# Patient Record
Sex: FEMALE | Race: WHITE | Employment: UNEMPLOYED | URBAN - METROPOLITAN AREA
[De-identification: names, ages, dates, MRNs, and addresses within clinical notes are randomized per-mention and may not be internally consistent; named-entity substitution may affect disease eponyms.]

---

## 2017-01-26 ENCOUNTER — TELEPHONE (OUTPATIENT)
Dept: FAMILY MEDICINE CLINIC | Facility: CLINIC | Age: 57
End: 2017-01-26

## 2017-01-26 RX ORDER — MELOXICAM 15 MG/1
15 TABLET ORAL DAILY
Qty: 90 TABLET | Refills: 0 | Status: SHIPPED | OUTPATIENT
Start: 2017-01-26 | End: 2017-05-11

## 2017-01-26 NOTE — TELEPHONE ENCOUNTER
Pt states the injury occurred one week ago. She rested her knee over the weekend and it felt better Monday. She resumed her regular activity and had recurring symptoms Tuesday. She rested her knee again, but yesterday she had pain in her knee.  Pt describes

## 2017-01-26 NOTE — TELEPHONE ENCOUNTER
Pt notified by phone and verbalized understanding. Pt requested to make an appointment for Monday in case her knee does not improve over the weekend. Appointment scheduled for 1/30/17.     Future Appointments  Date Time Provider Jennifer Stanley   1/30/201

## 2017-01-26 NOTE — TELEPHONE ENCOUNTER
Well lets try and ice and elevate, and I can call in a refill for the meloxicam, and rest it for a while and see if that doen't help and if not then she needs to be seen

## 2017-01-28 ENCOUNTER — TELEPHONE (OUTPATIENT)
Dept: FAMILY MEDICINE CLINIC | Facility: CLINIC | Age: 57
End: 2017-01-28

## 2017-01-28 RX ORDER — CYCLOBENZAPRINE HCL 10 MG
TABLET ORAL
Qty: 90 TABLET | Refills: 0 | COMMUNITY
Start: 2017-01-28 | End: 2017-04-11

## 2017-01-28 NOTE — TELEPHONE ENCOUNTER
Fax received from [facility], [place], [facility] requesting refills of cyclobenzaprine 10 mg tablets. Fax form completed by Dr. Jose Aguilar and faxed to [phone].

## 2017-01-30 ENCOUNTER — HOSPITAL ENCOUNTER (OUTPATIENT)
Dept: GENERAL RADIOLOGY | Age: 57
Discharge: HOME | End: 2017-01-30
Attending: FAMILY MEDICINE
Payer: COMMERCIAL

## 2017-01-30 ENCOUNTER — OFFICE VISIT (OUTPATIENT)
Dept: FAMILY MEDICINE CLINIC | Facility: CLINIC | Age: 57
End: 2017-01-30

## 2017-01-30 VITALS
OXYGEN SATURATION: 96 % | TEMPERATURE: 99 F | SYSTOLIC BLOOD PRESSURE: 118 MMHG | HEIGHT: 65.5 IN | HEART RATE: 103 BPM | RESPIRATION RATE: 16 BRPM | DIASTOLIC BLOOD PRESSURE: 78 MMHG

## 2017-01-30 DIAGNOSIS — E66.01 MORBID OBESITY WITH BMI OF 45.0-49.9, ADULT (HCC): ICD-10-CM

## 2017-01-30 DIAGNOSIS — M25.562 ACUTE PAIN OF LEFT KNEE: ICD-10-CM

## 2017-01-30 DIAGNOSIS — M25.562 ACUTE PAIN OF LEFT KNEE: Primary | ICD-10-CM

## 2017-01-30 PROCEDURE — 99214 OFFICE O/P EST MOD 30 MIN: CPT | Performed by: FAMILY MEDICINE

## 2017-01-30 PROCEDURE — 73560 X-RAY EXAM OF KNEE 1 OR 2: CPT

## 2017-01-30 RX ORDER — NAPROXEN 500 MG/1
500 TABLET ORAL 2 TIMES DAILY WITH MEALS
Qty: 40 TABLET | Refills: 0 | Status: SHIPPED | OUTPATIENT
Start: 2017-01-30 | End: 2017-04-19

## 2017-01-30 NOTE — PROGRESS NOTES
Errol Brand is a 61year old female.   HPI:   Fayne Sever was trying to et into her car and she thinks she pushed off on her left knee and then felt something pop anteriorly and then it became very painful, and has been having issues with walking on it, has (Temporal)  Resp 16  Ht 65.5\"  SpO2 96%  GENERAL: well developed, well nourished,in no apparent distress  SKIN: no rashes,no suspicious lesions  HEENT: atraumatic, normocephalic,ears and throat are clear  NECK: supple,no adenopathy,no bruits  LUNGS: clear

## 2017-02-09 ENCOUNTER — TELEPHONE (OUTPATIENT)
Dept: FAMILY MEDICINE CLINIC | Facility: CLINIC | Age: 57
End: 2017-02-09

## 2017-02-09 RX ORDER — AMOXICILLIN 500 MG/1
500 CAPSULE ORAL 2 TIMES DAILY
Qty: 20 CAPSULE | Refills: 0 | Status: SHIPPED | OUTPATIENT
Start: 2017-02-09 | End: 2017-04-19

## 2017-02-09 RX ORDER — ACETAMINOPHEN AND CODEINE PHOSPHATE 300; 30 MG/1; MG/1
1 TABLET ORAL EVERY 4 HOURS PRN
Qty: 30 TABLET | Refills: 0 | Status: SHIPPED | OUTPATIENT
Start: 2017-02-09 | End: 2017-04-19

## 2017-03-07 ENCOUNTER — TELEPHONE (OUTPATIENT)
Dept: FAMILY MEDICINE CLINIC | Facility: CLINIC | Age: 57
End: 2017-03-07

## 2017-03-07 NOTE — TELEPHONE ENCOUNTER
Last refilled on 1/26/17 for # 90 with 0 rf. Last labs 2013. Last seen on 1/30/17. No future appointments. Thank you.

## 2017-03-09 NOTE — TELEPHONE ENCOUNTER
Dot Quintero wanted to know why prescription was not refilled. Advised that this was refilled on 1/26/17 for #90 and sent to [facility] in [place]. Should not need refill until late April. Dot Quintero will call to confirm.  Not sure if they provided with a 90-d

## 2017-03-10 ENCOUNTER — TELEPHONE (OUTPATIENT)
Dept: FAMILY MEDICINE CLINIC | Facility: CLINIC | Age: 57
End: 2017-03-10

## 2017-03-10 ENCOUNTER — NURSE ONLY (OUTPATIENT)
Dept: FAMILY MEDICINE CLINIC | Facility: CLINIC | Age: 57
End: 2017-03-10

## 2017-03-10 VITALS
DIASTOLIC BLOOD PRESSURE: 90 MMHG | OXYGEN SATURATION: 98 % | RESPIRATION RATE: 12 BRPM | SYSTOLIC BLOOD PRESSURE: 130 MMHG | TEMPERATURE: 98 F | HEART RATE: 101 BPM

## 2017-03-10 DIAGNOSIS — K04.7 DENTAL INFECTION: Primary | ICD-10-CM

## 2017-03-10 PROCEDURE — 99213 OFFICE O/P EST LOW 20 MIN: CPT | Performed by: PHYSICIAN ASSISTANT

## 2017-03-10 RX ORDER — ACETAMINOPHEN AND CODEINE PHOSPHATE 300; 30 MG/1; MG/1
1 TABLET ORAL EVERY 4 HOURS PRN
Qty: 5 TABLET | Refills: 0 | Status: SHIPPED
Start: 2017-03-10 | End: 2017-03-12 | Stop reason: WASHOUT

## 2017-03-10 RX ORDER — AMOXICILLIN AND CLAVULANATE POTASSIUM 875; 125 MG/1; MG/1
1 TABLET, FILM COATED ORAL 2 TIMES DAILY
Qty: 20 TABLET | Refills: 0 | Status: SHIPPED | OUTPATIENT
Start: 2017-03-10 | End: 2017-03-20

## 2017-03-10 NOTE — TELEPHONE ENCOUNTER
Left message on answering machine for patient to return phone call. Spoke to KE she states she needs get these medications from her dentist, this will not be something she will order.

## 2017-03-10 NOTE — PROGRESS NOTES
CHIEF COMPLAINT:   Patient presents with:  Dental Problem: tooth pain, multiple chipped/broken teeth        HPI:   Belle Lam is a 61year old female who presents with dental pain for a couple days.   Has a cracked tooth to right lower jaw with rednes • DJD (degenerative joint disease), lumbar    • DJD of shoulder      R    • Left knee DJD    • Migraine    • Dry eyes, bilateral    • TMJ arthritis       Social History:    Smoking Status: Former Smoker                   Packs/Day: 0.00  Years:           S Sig: Take 1 tablet by mouth 2 (two) times daily. Risks, benefits, and side effects of medication explained and discussed. The patient indicates understanding of these issues and agrees to the plan.   The patient is asked to follow-up with dent · If you have signs of an infection, you will be given an antibiotic. Take it as directed. Follow-up care  Follow up with your dentist, or as advised. Your pain may go away with the treatment given today.  But only a dentist can fully look at and treat the

## 2017-03-10 NOTE — PATIENT INSTRUCTIONS
Please follow up with your PCP if no improvement within 5-7 days. Go directly to the ER for any acute worsening of symptoms. Follow up with dentist. Ramandeep Reynolds to ER for any worsening of symptoms.    Dental Pain    A crack or cavity in a tooth can cause tooth bobby Call 911  Call 911 if any of these occur:  · Unusual drowsiness  · Headache or stiff neck  · Weakness or fainting  · Difficulty swallowing or breathing  When to seek medical advice  Call your health care provider right away if any of these occur:  · Your f

## 2017-03-10 NOTE — TELEPHONE ENCOUNTER
Pt called, has tooth pain. Would like to talk with a nurse. States she needs an antibiotic prescribed? Please call pt at [phone]  Won't be able to come in until 4 this afternoon if she needs to be seen by Dr. Gloria Aponte.       Patient states she called

## 2017-03-10 NOTE — TELEPHONE ENCOUNTER
Called and spoke to patient she states she will spread out the dose enough to be able to get her to Monday till DS is here. She also states that she will try to make it last until Tuesday.    Per KE she states she will send in t3 for her to get her through

## 2017-03-10 NOTE — TELEPHONE ENCOUNTER
Called and spoke to patient, she states she has only 10 tablets left of the tylenol 3 and has completely used all of the amoxicillin.    She states she will try and call her dentist office back as she has an appt on Tuesday and see if they can rx something

## 2017-03-15 ENCOUNTER — TELEPHONE (OUTPATIENT)
Dept: FAMILY MEDICINE CLINIC | Facility: CLINIC | Age: 57
End: 2017-03-15

## 2017-03-15 NOTE — TELEPHONE ENCOUNTER
Pt just wanted to say thank you for all your help. She is seeing a dentist for the issue and has f/u appt in two weeks.

## 2017-04-11 RX ORDER — CYCLOBENZAPRINE HCL 10 MG
TABLET ORAL
Qty: 90 TABLET | Refills: 0 | Status: SHIPPED | OUTPATIENT
Start: 2017-04-11 | End: 2017-05-11

## 2017-04-19 ENCOUNTER — TELEPHONE (OUTPATIENT)
Dept: FAMILY MEDICINE CLINIC | Facility: CLINIC | Age: 57
End: 2017-04-19

## 2017-04-19 RX ORDER — ACETAMINOPHEN AND CODEINE PHOSPHATE 300; 30 MG/1; MG/1
1 TABLET ORAL EVERY 4 HOURS PRN
Qty: 30 TABLET | Refills: 0 | Status: SHIPPED | OUTPATIENT
Start: 2017-04-19 | End: 2018-08-15

## 2017-04-20 NOTE — TELEPHONE ENCOUNTER
Pt was confused and did not know what pharmacy to  her script for T3. She was tearful and retold the story of her fall. Please call in AM for follow up.

## 2017-04-20 NOTE — TELEPHONE ENCOUNTER
Spoke with her, advised where the prescription was sent. States she is still in a lot of pain. Offered OV, she scheduled for Tuesday.

## 2017-05-11 ENCOUNTER — TELEPHONE (OUTPATIENT)
Dept: FAMILY MEDICINE CLINIC | Facility: CLINIC | Age: 57
End: 2017-05-11

## 2017-05-11 DIAGNOSIS — Z00.00 ROUTINE HEALTH MAINTENANCE: Primary | ICD-10-CM

## 2017-05-11 DIAGNOSIS — R79.89 ELEVATED LIVER FUNCTION TESTS: ICD-10-CM

## 2017-05-11 RX ORDER — CYCLOBENZAPRINE HCL 10 MG
TABLET ORAL
Qty: 90 TABLET | Refills: 0 | Status: SHIPPED | OUTPATIENT
Start: 2017-05-11 | End: 2017-06-13

## 2017-05-11 RX ORDER — MELOXICAM 15 MG/1
TABLET ORAL
Qty: 90 TABLET | Refills: 3 | Status: SHIPPED | OUTPATIENT
Start: 2017-05-11 | End: 2018-02-23

## 2017-05-11 NOTE — TELEPHONE ENCOUNTER
Pt is willing to do fecal occult testing. She also stated she is overdue for labs to check her liver enzymes.

## 2017-05-11 NOTE — TELEPHONE ENCOUNTER
Last refill on Meloxicam #90 with 0 refills on 1 26 2017  Last refill on Cyclobenzaprine #90 with 0 refills on 4 11 2017

## 2017-06-08 ENCOUNTER — TELEPHONE (OUTPATIENT)
Dept: FAMILY MEDICINE CLINIC | Facility: CLINIC | Age: 57
End: 2017-06-08

## 2017-06-08 NOTE — TELEPHONE ENCOUNTER
Pt may have taken four tablets of venlafaxine 150 mg ER capsules by mistake. Pt states she is unsure if she took two capsules this morning. She was refilling her pill box and couldn't remember if she took her morning dose.  She took an additional two capsul

## 2017-06-12 ENCOUNTER — TELEPHONE (OUTPATIENT)
Dept: FAMILY MEDICINE CLINIC | Facility: CLINIC | Age: 57
End: 2017-06-12

## 2017-06-12 NOTE — TELEPHONE ENCOUNTER
Pt is overdue to have CMP drawn and stool tested for occult blood. Letter mailed to pt to call office to schedule.

## 2017-06-13 RX ORDER — CYCLOBENZAPRINE HCL 10 MG
TABLET ORAL
Qty: 90 TABLET | Refills: 0 | Status: SHIPPED | OUTPATIENT
Start: 2017-06-13 | End: 2017-08-29

## 2017-08-11 RX ORDER — METAXALONE 800 MG/1
400 TABLET ORAL 3 TIMES DAILY PRN
Qty: 5 TABLET | Refills: 0 | Status: SHIPPED | OUTPATIENT
Start: 2017-08-11 | End: 2017-09-10

## 2017-08-11 NOTE — TELEPHONE ENCOUNTER
Patient notified and verbalized understanding. States she is having a lot of pain in her knee and would like to know if she can be seen Monday or Tuesday. Routed to Dr Nilson Green to advise.    Patient aware Dr Nilson Green out of the office until Monday

## 2017-08-11 NOTE — TELEPHONE ENCOUNTER
Last OV 1/30/17  Last refilled 7/18/16  #90  2 refills    Spoke with patient who states she takes cyclobenzaprine daily but only takes metaxalone rarely if needed.   Advised patient Dr Mirian Ramachandran is out of the office until Monday and will not address refill unti

## 2017-08-11 NOTE — TELEPHONE ENCOUNTER
Metaxalone 800 MG Oral Tab (Discontinued) 90 tablet 2 7/18/2016 4/19/2017   Sig :  Take 0.5 tablets (400 mg total) by mouth 3 (three) times daily. Ye[address] Ates [address].

## 2017-08-12 NOTE — TELEPHONE ENCOUNTER
@;30 on Thursday would be better but what about trying to see Ortho, Dr. Frankey Heir is on on Tuesdays\"

## 2017-08-17 ENCOUNTER — PATIENT OUTREACH (OUTPATIENT)
Dept: FAMILY MEDICINE CLINIC | Facility: CLINIC | Age: 57
End: 2017-08-17

## 2017-08-17 NOTE — PROGRESS NOTES
Ny Press is due for mammogram.   Last mammogram date:  None in Epic. Mychart/Letter sent to patient.

## 2017-08-29 ENCOUNTER — TELEPHONE (OUTPATIENT)
Dept: FAMILY MEDICINE CLINIC | Facility: CLINIC | Age: 57
End: 2017-08-29

## 2017-08-29 RX ORDER — CYCLOBENZAPRINE HCL 10 MG
TABLET ORAL
Qty: 90 TABLET | Refills: 0 | Status: SHIPPED | OUTPATIENT
Start: 2017-08-29 | End: 2017-12-27

## 2017-08-29 RX ORDER — CLOTRIMAZOLE AND BETAMETHASONE DIPROPIONATE 10; .64 MG/G; MG/G
CREAM TOPICAL
Qty: 60 G | Refills: 3 | Status: SHIPPED | OUTPATIENT
Start: 2017-08-29 | End: 2019-06-03

## 2017-08-29 NOTE — TELEPHONE ENCOUNTER
PT CALLED AND ADV THAT PT HAS BAD RASH (FUNGAL INFECTION) UNDER BREAST AND IN CREASE ON UNDERWARE BETWEEN LEGS.    PT WOULD LIKE TO TRY fluconazole (DIFLUCAN) 100 MG Oral Tab     PLEASE SEND TO [facility] [address]    THANK YOU---PLEASE CALL IF YOU W

## 2017-11-14 ENCOUNTER — TELEPHONE (OUTPATIENT)
Dept: FAMILY MEDICINE CLINIC | Facility: CLINIC | Age: 57
End: 2017-11-14

## 2017-11-14 RX ORDER — METHYLPREDNISOLONE 4 MG/1
TABLET ORAL
Qty: 1 KIT | Refills: 0 | Status: SHIPPED | OUTPATIENT
Start: 2017-11-14 | End: 2017-12-28 | Stop reason: ALTCHOICE

## 2017-11-14 NOTE — TELEPHONE ENCOUNTER
Who was she seeing? And where?  She could probably go to Dr. Randa Myers but he would need her films and records  I can call in a medrol dose isadora

## 2017-11-14 NOTE — TELEPHONE ENCOUNTER
Pt will be seeing surgeon next week. Detailed message left for pt on cell informing her medrol dose pack was prescribed.

## 2017-11-14 NOTE — TELEPHONE ENCOUNTER
Pt having second shoulder surgery. To see surgeon next week Pain is bad in thumb and shoulder. Pain specialist moved too far away. Can DS prescribe something?  [facility] [address]

## 2017-12-27 ENCOUNTER — TELEPHONE (OUTPATIENT)
Dept: FAMILY MEDICINE CLINIC | Facility: CLINIC | Age: 57
End: 2017-12-27

## 2017-12-27 RX ORDER — CYCLOBENZAPRINE HCL 10 MG
TABLET ORAL
Qty: 90 TABLET | Refills: 0 | Status: SHIPPED | OUTPATIENT
Start: 2017-12-27 | End: 2018-02-14

## 2017-12-27 RX ORDER — CYCLOBENZAPRINE HCL 10 MG
TABLET ORAL
Qty: 90 TABLET | Refills: 0 | OUTPATIENT
Start: 2017-12-27

## 2017-12-27 NOTE — TELEPHONE ENCOUNTER
Spoke with pt and she already has Flexeril 10mg on hand- does not need an additional script. Pharmacy notified and script was cancelled.     Jessica Holland, 12/27/17, 4:38 PM

## 2017-12-27 NOTE — TELEPHONE ENCOUNTER
Pt states that she fell on [place] day on the sidewalk- hit head, shoulder, elbow and lower back. Then got in the car and drove to [facility]- stated she felt fine. Then yesterday she states that her back started seizing up.     What can she take manuel

## 2017-12-27 NOTE — TELEPHONE ENCOUNTER
Refilled today to [facility] and maricel'd. Patient stated she had Flexeril at home. Called patient. States she does not need a refill.

## 2017-12-27 NOTE — TELEPHONE ENCOUNTER
Patient fell on [place]. Is having some issues with pain in her lower back [facility]. Has an appointment tomorrow with Dr Amrita Wharton. Wants to know what she can take for the pain OTC or prescription drugs that she has at home.

## 2017-12-28 ENCOUNTER — OFFICE VISIT (OUTPATIENT)
Dept: FAMILY MEDICINE CLINIC | Facility: CLINIC | Age: 57
End: 2017-12-28

## 2017-12-28 VITALS
RESPIRATION RATE: 16 BRPM | DIASTOLIC BLOOD PRESSURE: 84 MMHG | TEMPERATURE: 98 F | HEART RATE: 88 BPM | SYSTOLIC BLOOD PRESSURE: 134 MMHG | OXYGEN SATURATION: 98 %

## 2017-12-28 DIAGNOSIS — F31.12 BIPOLAR 1 DISORDER WITH MODERATE MANIA (HCC): ICD-10-CM

## 2017-12-28 DIAGNOSIS — M17.12 PRIMARY OSTEOARTHRITIS OF LEFT KNEE: ICD-10-CM

## 2017-12-28 DIAGNOSIS — M62.830 LUMBAR PARASPINAL MUSCLE SPASM: ICD-10-CM

## 2017-12-28 DIAGNOSIS — M25.50 ARTHRALGIA, UNSPECIFIED JOINT: ICD-10-CM

## 2017-12-28 DIAGNOSIS — M25.562 ACUTE PAIN OF BOTH KNEES: Primary | ICD-10-CM

## 2017-12-28 DIAGNOSIS — M25.561 ACUTE PAIN OF BOTH KNEES: Primary | ICD-10-CM

## 2017-12-28 PROCEDURE — 99214 OFFICE O/P EST MOD 30 MIN: CPT | Performed by: FAMILY MEDICINE

## 2017-12-28 RX ORDER — LAMOTRIGINE 100 MG/1
100 TABLET ORAL 2 TIMES DAILY
Refills: 2 | COMMUNITY
Start: 2017-12-27 | End: 2021-04-29

## 2017-12-28 RX ORDER — ALPRAZOLAM 1 MG/1
TABLET ORAL
Refills: 4 | COMMUNITY
Start: 2017-12-27

## 2017-12-28 RX ORDER — VENLAFAXINE HYDROCHLORIDE 150 MG/1
150 CAPSULE, EXTENDED RELEASE ORAL DAILY
Refills: 2 | COMMUNITY
Start: 2017-12-27 | End: 2020-09-17

## 2017-12-28 RX ORDER — DEXTROAMPHETAMINE SACCHARATE, AMPHETAMINE ASPARTATE, DEXTROAMPHETAMINE SULFATE AND AMPHETAMINE SULFATE 2.5; 2.5; 2.5; 2.5 MG/1; MG/1; MG/1; MG/1
10 TABLET ORAL 2 TIMES DAILY
Refills: 0 | COMMUNITY
Start: 2017-11-11 | End: 2020-09-17

## 2017-12-28 RX ORDER — OXCARBAZEPINE 150 MG/1
150 TABLET, FILM COATED ORAL 2 TIMES DAILY
Refills: 2 | COMMUNITY
Start: 2017-12-27 | End: 2021-04-29

## 2017-12-28 NOTE — PROGRESS NOTES
Chloe Ramos is a 61year old female.   HPI:   Caden is here for evaluation of left knee pain after she [place] the ice on [place], she [address] [facility] with and then bounced off her face and landed on her knees she then also developed low back pain  and use: No                 REVIEW OF SYSTEMS:   GENERAL HEALTH: feels well otherwise  SKIN: facial rash  RESPIRATORY: denies shortness of breath with exertion  CARDIOVASCULAR: denies chest pain on exertion  GI: denies abdominal pain and denies heartburn  NEUR

## 2018-02-14 RX ORDER — CYCLOBENZAPRINE HCL 10 MG
TABLET ORAL
Qty: 90 TABLET | Refills: 0 | Status: SHIPPED | OUTPATIENT
Start: 2018-02-14 | End: 2018-04-12

## 2018-02-14 NOTE — TELEPHONE ENCOUNTER
Last refilled on 12/27/17 for # 90 with 0 rf. Last seen on 12/28/17. No future appointments. Thank you.

## 2018-02-23 RX ORDER — MELOXICAM 15 MG/1
TABLET ORAL
Qty: 30 TABLET | Refills: 6 | Status: SHIPPED | OUTPATIENT
Start: 2018-02-23 | End: 2018-04-12

## 2018-04-12 ENCOUNTER — OFFICE VISIT (OUTPATIENT)
Dept: FAMILY MEDICINE CLINIC | Facility: CLINIC | Age: 58
End: 2018-04-12

## 2018-04-12 VITALS
WEIGHT: 293 LBS | TEMPERATURE: 98 F | HEART RATE: 84 BPM | HEIGHT: 65 IN | RESPIRATION RATE: 20 BRPM | BODY MASS INDEX: 48.82 KG/M2 | DIASTOLIC BLOOD PRESSURE: 68 MMHG | SYSTOLIC BLOOD PRESSURE: 118 MMHG

## 2018-04-12 DIAGNOSIS — S03.00XA DISLOCATION OF TEMPOROMANDIBULAR JOINT, INITIAL ENCOUNTER: ICD-10-CM

## 2018-04-12 DIAGNOSIS — S46.002S OSTEOARTHRITIS OF LEFT SHOULDER DUE TO ROTATOR CUFF INJURY: ICD-10-CM

## 2018-04-12 DIAGNOSIS — G89.29 CHRONIC MIDLINE LOW BACK PAIN WITHOUT SCIATICA: Primary | ICD-10-CM

## 2018-04-12 DIAGNOSIS — M54.50 CHRONIC MIDLINE LOW BACK PAIN WITHOUT SCIATICA: Primary | ICD-10-CM

## 2018-04-12 DIAGNOSIS — M19.112 OSTEOARTHRITIS OF LEFT SHOULDER DUE TO ROTATOR CUFF INJURY: ICD-10-CM

## 2018-04-12 PROCEDURE — 99214 OFFICE O/P EST MOD 30 MIN: CPT | Performed by: FAMILY MEDICINE

## 2018-04-12 RX ORDER — MELOXICAM 15 MG/1
TABLET ORAL
Qty: 30 TABLET | Refills: 6 | Status: SHIPPED | OUTPATIENT
Start: 2018-04-12 | End: 2018-12-10

## 2018-04-12 RX ORDER — AMOXICILLIN 500 MG/1
CAPSULE ORAL
Refills: 0 | COMMUNITY
Start: 2018-03-23 | End: 2019-08-08

## 2018-04-12 RX ORDER — CYCLOBENZAPRINE HCL 10 MG
TABLET ORAL
Qty: 90 TABLET | Refills: 0 | Status: SHIPPED | OUTPATIENT
Start: 2018-04-12 | End: 2018-08-15

## 2018-04-12 RX ORDER — HYDROCODONE BITARTRATE AND ACETAMINOPHEN 5; 325 MG/1; MG/1
TABLET ORAL
Refills: 0 | COMMUNITY
Start: 2018-03-23 | End: 2019-08-08

## 2018-04-12 NOTE — PROGRESS NOTES
Raymundo Mi is a 61year old female.   HPI:   [facility] is here for follow up on her shoulder injury after she fell she has a rotator cuff injury, and is seeing Dr. Marden Mortimer and is at the point where she failed PT and pain management and is ready, her Back Migraine    • Psoriasis    • TMJ arthritis       Social History:  Smoking status: Former Smoker                                                              Packs/day: 0.00      Years: 0.00         Start date: 5/10/2013  Smokeless tobacco: Never Used to return in for her preop exam  Will get mammogram at [facility], and also will get colonoscopy done after she gets her shoulder and back done.

## 2018-05-24 ENCOUNTER — TELEPHONE (OUTPATIENT)
Dept: FAMILY MEDICINE CLINIC | Facility: CLINIC | Age: 58
End: 2018-05-24

## 2018-05-24 NOTE — TELEPHONE ENCOUNTER
Patient went to the [facility] to get a new ID because she looks so different from the one she has. She got there at 4:45 and when she pulled in the paul next to her told her he didn't think she was going to make it before they closed.  So she started rushing and g

## 2018-05-24 NOTE — TELEPHONE ENCOUNTER
LM for pt that she should go to [facility]. Address and location of [facility] was provided on VM.   Office number provided for further questions

## 2018-07-25 RX ORDER — CYCLOBENZAPRINE HCL 10 MG
TABLET ORAL
Qty: 90 TABLET | Refills: 0 | OUTPATIENT
Start: 2018-07-25

## 2018-08-15 RX ORDER — CYCLOBENZAPRINE HCL 10 MG
TABLET ORAL
Qty: 90 TABLET | Refills: 0 | Status: SHIPPED | OUTPATIENT
Start: 2018-08-15 | End: 2018-12-03

## 2018-08-15 RX ORDER — ACETAMINOPHEN AND CODEINE PHOSPHATE 300; 30 MG/1; MG/1
1 TABLET ORAL EVERY 4 HOURS PRN
Qty: 30 TABLET | Refills: 0 | Status: SHIPPED | OUTPATIENT
Start: 2018-08-15 | End: 2018-08-25 | Stop reason: WASHOUT

## 2018-08-15 NOTE — TELEPHONE ENCOUNTER
Cyclobenzaprine HCl 10 MG Oral Tab 90 tablet 0 4/12/2018    Sig :  TAKE 1 TABLET THREE TIMES DAILY AS NEEDED FOR MUSCLE SPASMS       Acetaminophen-Codeine (TYLENOL WITH CODEINE #3) 300-30 MG Oral Tab 30 tablet 0 4/19/2017 4/29/2017   Sig :  Take 1 tablet b

## 2018-08-15 NOTE — TELEPHONE ENCOUNTER
LOV: 4/12/2018  Last refill: 4/12/2018 #90 for flexeril   t3 4/19/2017 #30 with no refills. Medication pending in encounter  No future appointments.

## 2018-11-17 ENCOUNTER — TELEPHONE (OUTPATIENT)
Dept: FAMILY MEDICINE CLINIC | Facility: CLINIC | Age: 58
End: 2018-11-17

## 2018-11-17 RX ORDER — ACETAMINOPHEN AND CODEINE PHOSPHATE 300; 30 MG/1; MG/1
1 TABLET ORAL EVERY 12 HOURS PRN
Qty: 10 TABLET | Refills: 0 | Status: SHIPPED | OUTPATIENT
Start: 2018-11-17 | End: 2018-11-19

## 2018-11-17 NOTE — TELEPHONE ENCOUNTER
Spoke with the pt and she tells me that she is going to get a Shoulder replacement soon- she is having increased pain over the last 8 weeks and it has gotten worse- she is seeing Dr. Jeremie Carlisle .  I asked her if she had called the surgeon and she states that sh

## 2018-11-17 NOTE — TELEPHONE ENCOUNTER
Spoke with the pt and advised of the medication and to keep her appt for Monday  Advised to call the dentist about her tooth and she can ask Dr. Jerald Hou to address her shoulder pain on Monday and if she feels that her depression is getting worse to get in co

## 2018-11-17 NOTE — TELEPHONE ENCOUNTER
I can give her Tylenol #3 for shoulder pain to cover for the weekend. She should call back Monday to discuss this further with Dr. Bree Alvarado. Also she should call her dentist for tooth pain. If symptoms worsen she should go to the urgent care or ER.      Ramandeep

## 2018-11-17 NOTE — TELEPHONE ENCOUNTER
PT COMPLAIN OF SHOULDER PAIN AND ALSO TOOTH PAIN, PT HAVING HIGH ANXIETY AND NO SLEEP  .     PLEASE CALL AND ADV      PHARMACY [facility]   [address]

## 2018-11-19 ENCOUNTER — OFFICE VISIT (OUTPATIENT)
Dept: FAMILY MEDICINE CLINIC | Facility: CLINIC | Age: 58
End: 2018-11-19
Payer: COMMERCIAL

## 2018-11-19 VITALS
SYSTOLIC BLOOD PRESSURE: 148 MMHG | RESPIRATION RATE: 20 BRPM | HEIGHT: 65 IN | OXYGEN SATURATION: 99 % | TEMPERATURE: 99 F | HEART RATE: 109 BPM | BODY MASS INDEX: 49 KG/M2 | DIASTOLIC BLOOD PRESSURE: 84 MMHG

## 2018-11-19 DIAGNOSIS — M25.512 CHRONIC LEFT SHOULDER PAIN: ICD-10-CM

## 2018-11-19 DIAGNOSIS — F31.12 BIPOLAR 1 DISORDER WITH MODERATE MANIA (HCC): ICD-10-CM

## 2018-11-19 DIAGNOSIS — G89.29 CHRONIC LEFT SHOULDER PAIN: ICD-10-CM

## 2018-11-19 DIAGNOSIS — S46.002S OSTEOARTHRITIS OF LEFT SHOULDER DUE TO ROTATOR CUFF INJURY: Primary | ICD-10-CM

## 2018-11-19 DIAGNOSIS — M19.112 OSTEOARTHRITIS OF LEFT SHOULDER DUE TO ROTATOR CUFF INJURY: Primary | ICD-10-CM

## 2018-11-19 DIAGNOSIS — M54.2 NECK PAIN: ICD-10-CM

## 2018-11-19 DIAGNOSIS — S02.5XXK CLOSED FRACTURE OF TOOTH WITH NONUNION, SUBSEQUENT ENCOUNTER: ICD-10-CM

## 2018-11-19 PROCEDURE — 99214 OFFICE O/P EST MOD 30 MIN: CPT | Performed by: FAMILY MEDICINE

## 2018-11-19 RX ORDER — ACETAMINOPHEN AND CODEINE PHOSPHATE 300; 30 MG/1; MG/1
1 TABLET ORAL EVERY 12 HOURS PRN
Qty: 30 TABLET | Refills: 0 | Status: SHIPPED | OUTPATIENT
Start: 2018-11-19 | End: 2018-12-19 | Stop reason: WASHOUT

## 2018-11-19 NOTE — PROGRESS NOTES
Lucia Barton is a 60year old female.   HPI:   Perry Holbrook is here for evaluation of left shoulder pain she was supposed to get her shoulder replaced last year, and put it off instead, but now she  Is having considerable pain taking meloxicam daily, Left knee DJD    • Migraine    • Psoriasis    • TMJ arthritis       Social History:  Social History    Tobacco Use      Smoking status: Former Smoker        Start date: 5/10/2013      Smokeless tobacco: Never Used    Alcohol use: No    Drug use: No       R CODEINE #3) 300-30 MG Oral Tab 30 tablet 0     Sig: Take 1 tablet by mouth every 12 (twelve) hours as needed for Pain. Imaging & Consults:  None     The patient indicates understanding of these issues and agrees to the plan.   The patient is asked to

## 2018-12-03 RX ORDER — CYCLOBENZAPRINE HCL 10 MG
TABLET ORAL
Qty: 90 TABLET | Refills: 0 | Status: SHIPPED | OUTPATIENT
Start: 2018-12-03 | End: 2019-03-08

## 2018-12-10 RX ORDER — MELOXICAM 15 MG/1
TABLET ORAL
Qty: 30 TABLET | Refills: 6 | Status: SHIPPED | OUTPATIENT
Start: 2018-12-10 | End: 2019-09-11

## 2018-12-10 NOTE — TELEPHONE ENCOUNTER
Last refilled on 4/12/18 for # 30 with 6 refills  Last OV 11/19/18  No future appointments. Thank you.

## 2019-03-11 RX ORDER — CYCLOBENZAPRINE HCL 10 MG
TABLET ORAL
Qty: 90 TABLET | Refills: 0 | Status: SHIPPED | OUTPATIENT
Start: 2019-03-11 | End: 2019-05-25

## 2019-04-03 ENCOUNTER — TELEPHONE (OUTPATIENT)
Dept: FAMILY MEDICINE CLINIC | Facility: CLINIC | Age: 59
End: 2019-04-03

## 2019-04-03 DIAGNOSIS — Z12.11 SCREENING FOR MALIGNANT NEOPLASM OF COLON: Primary | ICD-10-CM

## 2019-04-23 ENCOUNTER — TELEPHONE (OUTPATIENT)
Dept: FAMILY MEDICINE CLINIC | Facility: CLINIC | Age: 59
End: 2019-04-23

## 2019-04-23 DIAGNOSIS — Z12.39 SCREENING FOR MALIGNANT NEOPLASM OF BREAST: Primary | ICD-10-CM

## 2019-04-27 ENCOUNTER — HOSPITAL ENCOUNTER (OUTPATIENT)
Age: 59
Discharge: HOME | End: 2019-04-27
Attending: FAMILY MEDICINE
Payer: COMMERCIAL

## 2019-04-27 VITALS
DIASTOLIC BLOOD PRESSURE: 65 MMHG | OXYGEN SATURATION: 97 % | RESPIRATION RATE: 18 BRPM | HEIGHT: 65 IN | WEIGHT: 275 LBS | SYSTOLIC BLOOD PRESSURE: 148 MMHG | BODY MASS INDEX: 45.82 KG/M2 | TEMPERATURE: 98 F | HEART RATE: 94 BPM

## 2019-04-27 DIAGNOSIS — M25.512 ACUTE PAIN OF LEFT SHOULDER: Primary | ICD-10-CM

## 2019-04-27 PROCEDURE — 99203 OFFICE O/P NEW LOW 30 MIN: CPT

## 2019-04-27 PROCEDURE — 99213 OFFICE O/P EST LOW 20 MIN: CPT

## 2019-04-27 RX ORDER — HYDROCODONE BITARTRATE AND ACETAMINOPHEN 5; 325 MG/1; MG/1
1 TABLET ORAL EVERY 6 HOURS PRN
Qty: 12 TABLET | Refills: 0 | Status: SHIPPED | OUTPATIENT
Start: 2019-04-27 | End: 2019-05-28

## 2019-04-27 NOTE — ED INITIAL ASSESSMENT (HPI)
Left shoulder pain started couple days ago. Has had pain in the left shoulder in past. States feels very swollen and tender and radiates down from shoulder to mid upper arm. Using otc physiology type tape.

## 2019-05-03 ENCOUNTER — TELEPHONE (OUTPATIENT)
Dept: FAMILY MEDICINE CLINIC | Facility: CLINIC | Age: 59
End: 2019-05-03

## 2019-05-03 NOTE — TELEPHONE ENCOUNTER
Letter mailed letting her know she has outstanding orders.     Lab Frequency Next Occurrence   OCCULT BLOOD, FECAL, IMMUNOASSAY Once 04/03/2019   RIMA SCREENING BILAT (CPT=77067) Once 04/23/2019

## 2019-05-23 ENCOUNTER — TELEPHONE (OUTPATIENT)
Dept: FAMILY MEDICINE CLINIC | Facility: CLINIC | Age: 59
End: 2019-05-23

## 2019-05-25 DIAGNOSIS — M25.512 ACUTE PAIN OF LEFT SHOULDER: ICD-10-CM

## 2019-05-25 NOTE — TELEPHONE ENCOUNTER
Last refill: 3/11/2019 #90 with 0 refills  Last Visit: 11/19/2018   Next Visit: No future appointments. Forward to Dr. Torres Elders please advise on refills. Thanks.

## 2019-05-25 NOTE — TELEPHONE ENCOUNTER
Last refill: 4/27/2019 by Dr. Norma Miller  Last Visit: 11/19/2018   Next Visit:   Future Appointments   Date Time Provider Jennifer Lizeth   6/3/2019  1:45 PM Viktoriya Triplett DO [address] [facility]         Forward to Dr. Chang Vernon please advise on refills. Thanks.

## 2019-05-25 NOTE — TELEPHONE ENCOUNTER
CYCLOBENZAPCYCLOBENZAPRINE HCL 10 MG Oral TabRINE HCL 10 MG Oral Tab  [facility] [address], [address] [facility] [address] [facility] [address], [phone], [phone]

## 2019-05-28 RX ORDER — CYCLOBENZAPRINE HCL 10 MG
TABLET ORAL
Qty: 90 TABLET | Refills: 1 | Status: SHIPPED | OUTPATIENT
Start: 2019-05-28 | End: 2019-08-08

## 2019-05-28 RX ORDER — CYCLOBENZAPRINE HCL 10 MG
TABLET ORAL
Qty: 90 TABLET | Refills: 2 | Status: SHIPPED | OUTPATIENT
Start: 2019-05-28 | End: 2019-08-08

## 2019-05-28 RX ORDER — HYDROCODONE BITARTRATE AND ACETAMINOPHEN 5; 325 MG/1; MG/1
1 TABLET ORAL EVERY 6 HOURS PRN
Qty: 12 TABLET | Refills: 0 | Status: SHIPPED | OUTPATIENT
Start: 2019-05-28 | End: 2019-07-16

## 2019-06-03 ENCOUNTER — OFFICE VISIT (OUTPATIENT)
Dept: FAMILY MEDICINE CLINIC | Facility: CLINIC | Age: 59
End: 2019-06-03
Payer: COMMERCIAL

## 2019-06-03 ENCOUNTER — TELEPHONE (OUTPATIENT)
Dept: FAMILY MEDICINE CLINIC | Facility: CLINIC | Age: 59
End: 2019-06-03

## 2019-06-03 VITALS
TEMPERATURE: 99 F | DIASTOLIC BLOOD PRESSURE: 76 MMHG | SYSTOLIC BLOOD PRESSURE: 110 MMHG | RESPIRATION RATE: 20 BRPM | BODY MASS INDEX: 53 KG/M2 | WEIGHT: 293 LBS | HEART RATE: 104 BPM

## 2019-06-03 DIAGNOSIS — M25.512 CHRONIC LEFT SHOULDER PAIN: Primary | ICD-10-CM

## 2019-06-03 DIAGNOSIS — M25.542 ARTHRALGIA OF BOTH HANDS: ICD-10-CM

## 2019-06-03 DIAGNOSIS — M25.541 ARTHRALGIA OF BOTH HANDS: ICD-10-CM

## 2019-06-03 DIAGNOSIS — G89.29 CHRONIC LEFT SHOULDER PAIN: Primary | ICD-10-CM

## 2019-06-03 DIAGNOSIS — M19.112 OSTEOARTHRITIS OF LEFT SHOULDER DUE TO ROTATOR CUFF INJURY: ICD-10-CM

## 2019-06-03 DIAGNOSIS — M25.531 PAIN OF BOTH WRIST JOINTS: ICD-10-CM

## 2019-06-03 DIAGNOSIS — S46.002S OSTEOARTHRITIS OF LEFT SHOULDER DUE TO ROTATOR CUFF INJURY: ICD-10-CM

## 2019-06-03 DIAGNOSIS — M25.532 PAIN OF BOTH WRIST JOINTS: ICD-10-CM

## 2019-06-03 PROCEDURE — 99214 OFFICE O/P EST MOD 30 MIN: CPT | Performed by: FAMILY MEDICINE

## 2019-06-03 RX ORDER — CLOTRIMAZOLE AND BETAMETHASONE DIPROPIONATE 10; .64 MG/G; MG/G
CREAM TOPICAL
Qty: 60 G | Refills: 3 | Status: SHIPPED | OUTPATIENT
Start: 2019-06-03 | End: 2021-06-10

## 2019-06-03 NOTE — TELEPHONE ENCOUNTER
Noeed pt to give phone number to Dion Carr- pt verbalized understanding    Pt states she remembered something after her visit she is taking a new supplement - 5000 IU every other day. Pt wanted to make sure DS was aware of that.

## 2019-06-03 NOTE — PROGRESS NOTES
Felicita العلي is a 60year old female.   HPI:   Santos Lee is here for discussion of issue related to her left shoulder which she injured last year, and tore her rotator cuff, she had been going through PT and I believe she saw ortho as well, was told she ne Rfl: 2   amphetamine-dextroamphetamine 10 MG Oral Tab Take 10 mg by mouth 2 (two) times daily.  Disp:  Rfl: 0   ALPRAZolam 1 MG Oral Tab  Disp:  Rfl: 4   clotrimazole-betamethasone 1-0.05 % External Cream Apply to the affected areas bid for 10-14 days Disp Chronic left shoulder pain  (primary encounter diagnosis)  Osteoarthritis of left shoulder due to rotator cuff injury  Pain of both wrist joints  Arthralgia of both hands    Dr. Maria Teresa Bales [phone]  Referred to [facility] counseling Was also give

## 2019-07-16 DIAGNOSIS — M25.512 ACUTE PAIN OF LEFT SHOULDER: ICD-10-CM

## 2019-07-16 RX ORDER — HYDROCODONE BITARTRATE AND ACETAMINOPHEN 5; 325 MG/1; MG/1
1 TABLET ORAL EVERY 6 HOURS PRN
Qty: 12 TABLET | Refills: 0 | Status: SHIPPED | OUTPATIENT
Start: 2019-07-16 | End: 2020-09-17

## 2019-07-16 NOTE — TELEPHONE ENCOUNTER
PT CALLED AND ADV THAT SHE OVER DID IT THE LAST FEW DAYS.    LOOKING FOR REFILL OF    HYDROcodone-acetaminophen 5-325 MG Oral Tab      PLEASE CALL AND ADV WHEN READY FOR P/U    THANK YOU

## 2019-08-08 ENCOUNTER — TELEPHONE (OUTPATIENT)
Dept: FAMILY MEDICINE CLINIC | Facility: CLINIC | Age: 59
End: 2019-08-08

## 2019-08-08 RX ORDER — NAPROXEN 500 MG/1
500 TABLET ORAL 2 TIMES DAILY WITH MEALS
Qty: 20 TABLET | Refills: 1 | Status: SHIPPED | OUTPATIENT
Start: 2019-08-08 | End: 2019-08-18

## 2019-08-08 NOTE — TELEPHONE ENCOUNTER
Well there's not a lot to do for this other than, use some heat, some gentle stretching and I will call in an antiinflammatory with food tid for maybe ten days.  It may take a few weeks for this to completely resolve, but be patient it will get better

## 2019-08-08 NOTE — TELEPHONE ENCOUNTER
Pt pulled her groin muscle while trying to sit down on a chair that she didn't know it was a glider. She did this last Saturday and it still hurts. She wants to know if DS can give her a pain med.    Please return call to [phone]

## 2019-08-08 NOTE — TELEPHONE ENCOUNTER
Mina Herman notified of advice and directions listed below. Informed to stop taking Meloxicam. Agrees to plan.

## 2019-08-23 ENCOUNTER — TELEPHONE (OUTPATIENT)
Dept: FAMILY MEDICINE CLINIC | Facility: CLINIC | Age: 59
End: 2019-08-23

## 2019-08-23 NOTE — TELEPHONE ENCOUNTER
I can not prescribe a narcotic/controlled substance as she is requesting. She needs to be seen. I have no appts left. She can either go to  or can see Dr. Vivi Rao tomorrow or see Dr. Vonda Ruiz on Monday. Thanks so much.

## 2019-08-23 NOTE — TELEPHONE ENCOUNTER
Left message on patients voice mail with the information and recommendations per . Provided office phone number to patient to call and make an appointment.

## 2019-09-11 RX ORDER — MELOXICAM 15 MG/1
TABLET ORAL
Qty: 30 TABLET | Refills: 2 | Status: SHIPPED | OUTPATIENT
Start: 2019-09-11 | End: 2020-01-03

## 2019-10-31 NOTE — LETTER
Assessment/Plan:      Diagnoses and all orders for this visit:    Numbness and tingling in both hands  -     methocarbamol (ROBAXIN) 750 mg tablet; Take 1 tablet (750 mg total) by mouth 3 (three) times a day as needed for muscle spasms    Neck stiffness  -     methocarbamol (ROBAXIN) 750 mg tablet; Take 1 tablet (750 mg total) by mouth 3 (three) times a day as needed for muscle spasms    Overweight (BMI 25 0-29 9)  -     Lipid panel; Future    Elevated glucose  -     Lipid panel; Future  -     Hemoglobin A1C; Future    Epigastric pain    Need for immunization against influenza  -     influenza vaccine, [id number], quadrivalent, 0 5 mL, preservative-free, for adult and pediatric patients 6 mos+ (AFLURIA, FLUARIX, FLULAVAL, FLUZONE)      patient is a very pleasant 50-year-old female presenting today to establish with our office  Her only pre-existing medical condition, which diagnosis is unknown as we have no records from her gastroenterologist in the [place], is epigastric pain for which she takes omeprazole 20 mg daily  She was seen in the ED [facility] on 10/03 for diarrhea and epigastric pain which patient states has since resolved  She will be following up with her gastroenterologist in [place] for these ongoing issues and states that they plan on performing EGD sometime within the next 2 months  She is currently stable and will continue omeprazole 20 mg daily  Patient is complaining today of some bilateral neck stiffness into the shoulders, worse on left than right, in addition to waking up with some numbness and tingling in all of her fingers bilaterally in the middle the night only  She does note soreness often times is worse based on the mental lifting she does at work but there has been no specific trauma occurring at work to cause symptoms, she states that she has had them for while  Numbness and tingling is more recent for about the past 2 months    I doubt numbness and tingling is [facility] [address], [address]  Consent for Procedure/Sedation    Date: 9/7/20    Time:               1. I authorize the performance upon Amadeo Naranjo the following:cardiac catheterization, left ventricular cineangiography, Signature of Patient: ____________________________________________________    Signature of person authorized                                     Relationship to  to consent for patient: _________________________ patient: ___________________    Witness: ___ secondary to a chronic disease or vitamin deficiency as the symptoms are only at night and says that she does sleep on her stomach with her hands underneath her body which could be compressing on nerves  She has great circulation with normal radial pulses and normal cap refill  I do suspect most likely nerve compression but difficult to tell if coming from the neck being that she does have chronic neck and shoulder pain or if it is coming locally from compression at the wrists when she sleeps  I will have patient start a muscle relaxer, Robaxin t i d  p r n  or just before bed as I advised caution that it can cause drowsiness  I also advised that she start gentle stretches of the neck as demonstrated in the office 10-15 minutes 2 to 3 times a day, moist heat or heating pad to the neck to relax muscles  She should only be using Tylenol products and avoid NSAIDs in lieu of her GI symptoms  She can certainly use mineral ice her icy Hot during the day  For the numbness and tingling I have advised that she consider wrist braces OTC that were explained to her in detail at the visit to prevent any kind of chronic flexion that 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  We will see how she does through the course of the next 3-4 weeks and was advised to call if symptoms persist or worsen and I will consider further workup including a neck x-ray and possible referral     Patient's BMI briefly addressed today, 29 67 at present  Discussed potential risks associated with overweight or obesity  Advised low-fat/low-cholesterol diet with handout provided upon leaving the office today in the after visit summary  Also encouraged 30 minutes of exercise at least 3 times a week in addition to her physical activity at work  We will continue to monitor  Patient is interested in routine labs  She did just have CBC and CMP 3 weeks ago in the ED which was relatively unremarkable    Her random sugar was 118 and she cannot recall specifically if she 8 before coming to the emergency department but she does have diabetes in her family, as well as high cholesterol  Will check A1c as well as fasting cholesterol  Other routine screenings and health maintenance were addressed with patient today  Pap smear up-to-date as well as her gyn exam and will be due for next Pap in 2021  Patient is due for mammogram and will consider getting mammogram performed in December with order provided by her gyn  Influenza vaccination administered today  Patient advised yearly physicals, sooner if any problems arise, especially if she continues with neck stiffness and the numbness and tingling  I did briefly addressed patient's breast size and since this has been an ongoing issue for years for her I do question if her breasts could be potentially affecting her neck, upper back and shoulders as well as posture  I did suggest that she consider investing in more supportive bras which may help a little with posture and support  Chief Complaint   Patient presents with   Rd Kirkpatrick Establish Care     new patient- hands go numb when she is sleeping x 2 months       Subjective:     Patient ID: Randy Huerta is a 36 y o  female     43y/o female here today to establish  She currently takes omeprazole for her stomach (epigastric pain and intermittent diarrhea), prescribed by her gastroenterologist in [place] (Dr Tanya Edwards)  She states she will be having endoscopy December and will continue following with him for her GI care  She is unsure of exact diagnoses  She states sxs she had a few weeks ago in ED 10/3 have resolved  She still does get intermittent sxs with omeprazole 20mg  She has a GYN ([facility]) - last appt10/29/19  Last PAP was 7/28/16 (next due 2021), last STD screening 10/29/19 - neg  Last eufemia unknown  Pt rito her mother was recently dx with breast CA and has plans to get eufemia done December - order given by her GYN      She notes pain/soreness in her B/L upper shoulders and neck off and on for a while now  She notes pain worse on left than right  She also all fingers at night numbness/tingling, causes her to wake up  States before was only left, now B/L  States when she notices sxs when she wakes up, she moves wrists and hands around and sxs resolve  She states she does neck stretches once a day, for 5 min  She also is using tylenol or motrin when is pain worse, maybe twice a week  She is not using ice or heat  She does note large breasts, 34 DDD  Review of Systems   Constitutional: Negative  Respiratory: Negative  Cardiovascular: Negative  Gastrointestinal:        As in HPI   Genitourinary: Negative  Musculoskeletal:        As in HPI   Skin: Negative  Neurological:        As in HPI   Psychiatric/Behavioral: Negative  The following portions of the patient's history were reviewed and updated as appropriate: allergies, current medications, past family history, past medical history, past social history, past surgical history and problem list       Objective:     Physical Exam   Constitutional: She is oriented to person, place, and time  She appears well-developed  No distress  Overweight BMI 29 67   Neck: Neck supple  Normal carotid pulses present  Muscular tenderness present  No spinous process tenderness present  Carotid bruit is not present  No edema, no erythema and normal range of motion present  Cardiovascular: Normal rate, regular rhythm, normal heart sounds and normal pulses  Normal cap refills < 3 seconds B/L  No LE edema   Pulmonary/Chest: Effort normal and breath sounds normal    Abdominal: Soft  Bowel sounds are normal  There is no tenderness  Mild abdominal obesity   Musculoskeletal:   Normal movement and strength of B/L UE's with discomfort noted B/L trapezius muscles   strength intact and symmetric  B/L wrists without tenderness to palpation Full AROM B/L wrists without pain  Slight tenderness to palpation right elbow medial epicondyle  FulL AROM B/L  Lymphadenopathy:        Head (right side): No submandibular and no tonsillar adenopathy present  Head (left side): No submandibular and no tonsillar adenopathy present  Neurological: She is alert and oriented to person, place, and time  She displays no atrophy  No sensory deficit  She exhibits normal muscle tone  Coordination and gait normal    Psychiatric: She has a normal mood and affect  Her speech is normal and behavior is normal    Vitals reviewed  Vitals:    10/31/19 0818   BP: 110/60   Pulse: 70   Temp: 98 2 °F (36 8 °C)   Weight: 64 4 kg (141 lb 15 6 oz)   Height: 4' 10" (1 473 m)     BMI Counseling: Body mass index is 29 67 kg/m²  The BMI is above normal  Nutrition recommendations include reducing portion sizes, decreasing overall calorie intake, 3-5 servings of fruits/vegetables daily, consuming healthier snacks, decreasing soda and/or juice intake, moderation in carbohydrate intake, increasing intake of lean protein, reducing intake of saturated fat and trans fat and reducing intake of cholesterol  Exercise recommendations include exercising 3-5 times per week

## 2019-12-09 ENCOUNTER — TELEPHONE (OUTPATIENT)
Dept: FAMILY MEDICINE CLINIC | Facility: CLINIC | Age: 59
End: 2019-12-09

## 2019-12-09 NOTE — TELEPHONE ENCOUNTER
Pt called, states she fell yesterday and now has bad pain in left shoulder and would like to be seen today. Please call pt at [phone]. Pt will probably need 30 min.

## 2019-12-09 NOTE — TELEPHONE ENCOUNTER
Patient advised via detailed message that Dr. Max Erickson is out of the office today and the covering providers have no openings. Advised to go to Urgent Care for evaluation.   (consent on file)

## 2019-12-14 ENCOUNTER — HOSPITAL ENCOUNTER (OUTPATIENT)
Age: 59
Discharge: HOME | End: 2019-12-14
Attending: FAMILY MEDICINE
Payer: COMMERCIAL

## 2019-12-14 VITALS
HEIGHT: 65 IN | DIASTOLIC BLOOD PRESSURE: 97 MMHG | WEIGHT: 275 LBS | RESPIRATION RATE: 20 BRPM | SYSTOLIC BLOOD PRESSURE: 142 MMHG | OXYGEN SATURATION: 98 % | BODY MASS INDEX: 45.82 KG/M2 | TEMPERATURE: 99 F | HEART RATE: 92 BPM

## 2019-12-14 DIAGNOSIS — M17.12 OSTEOARTHRITIS OF LEFT KNEE, UNSPECIFIED OSTEOARTHRITIS TYPE: Primary | ICD-10-CM

## 2019-12-14 DIAGNOSIS — K04.7 DENTAL INFECTION: ICD-10-CM

## 2019-12-14 PROCEDURE — 99213 OFFICE O/P EST LOW 20 MIN: CPT

## 2019-12-14 PROCEDURE — 99214 OFFICE O/P EST MOD 30 MIN: CPT

## 2019-12-14 RX ORDER — AMOXICILLIN AND CLAVULANATE POTASSIUM 875; 125 MG/1; MG/1
1 TABLET, FILM COATED ORAL 2 TIMES DAILY
Qty: 20 TABLET | Refills: 0 | Status: SHIPPED | OUTPATIENT
Start: 2019-12-14 | End: 2019-12-24

## 2019-12-14 RX ORDER — HYDROCODONE BITARTRATE AND ACETAMINOPHEN 5; 325 MG/1; MG/1
1-2 TABLET ORAL EVERY 6 HOURS PRN
Qty: 6 TABLET | Refills: 0 | Status: SHIPPED | OUTPATIENT
Start: 2019-12-14 | End: 2019-12-21

## 2019-12-14 NOTE — ED INITIAL ASSESSMENT (HPI)
Tripped on uneven ground and fell forward. Hit left knee. Having pain to pain knee. Pain to left upper teeth, has dental problems and having pain. States has been having problems with teeth and them falling out.

## 2019-12-17 NOTE — ED PROVIDER NOTES
Patient Seen in: THE [facility] [place]      History   Patient presents with:  Fall    Stated Complaint: body pains/ mouth pains    HPI    60year old female with history of Arthritis, Migraine, Depression and Psoriasis presents left knee pain. well-developed. HENT:      Head: Atraumatic.       Right Ear: Tympanic membrane and ear canal normal.      Left Ear: Tympanic membrane and ear canal normal.      Mouth/Throat:      Mouth: Mucous membranes are moist.      Dentition: Gingival swelling and d Disp-6 tablet, R-0    !! - Potential duplicate medications found. Please discuss with provider.

## 2020-01-03 RX ORDER — MELOXICAM 15 MG/1
15 TABLET ORAL
Qty: 30 TABLET | Refills: 2 | Status: SHIPPED | OUTPATIENT
Start: 2020-01-03 | End: 2020-05-06

## 2020-01-03 RX ORDER — CYCLOBENZAPRINE HCL 10 MG
TABLET ORAL
Qty: 90 TABLET | Refills: 1 | Status: SHIPPED | OUTPATIENT
Start: 2020-01-03 | End: 2020-08-29

## 2020-01-03 NOTE — TELEPHONE ENCOUNTER
Pt called, needs refills on MELOXICAM 15 MG Oral Tab and Cyclobenzaprine. Pharmacy [address] [place].   Please call pt at [phone]

## 2020-01-14 ENCOUNTER — OFFICE VISIT (OUTPATIENT)
Dept: FAMILY MEDICINE CLINIC | Facility: CLINIC | Age: 60
End: 2020-01-14
Payer: COMMERCIAL

## 2020-01-14 VITALS
DIASTOLIC BLOOD PRESSURE: 60 MMHG | HEART RATE: 66 BPM | TEMPERATURE: 98 F | RESPIRATION RATE: 16 BRPM | WEIGHT: 293 LBS | SYSTOLIC BLOOD PRESSURE: 110 MMHG | BODY MASS INDEX: 53 KG/M2 | OXYGEN SATURATION: 99 %

## 2020-01-14 DIAGNOSIS — L03.211 FACIAL CELLULITIS: Primary | ICD-10-CM

## 2020-01-14 DIAGNOSIS — K05.10 GINGIVITIS: ICD-10-CM

## 2020-01-14 PROCEDURE — 99214 OFFICE O/P EST MOD 30 MIN: CPT | Performed by: FAMILY MEDICINE

## 2020-01-14 RX ORDER — AMOXICILLIN AND CLAVULANATE POTASSIUM 875; 125 MG/1; MG/1
1 TABLET, FILM COATED ORAL 2 TIMES DAILY
Qty: 14 TABLET | Refills: 0 | Status: SHIPPED | OUTPATIENT
Start: 2020-01-14 | End: 2020-01-21

## 2020-01-14 NOTE — PROGRESS NOTES
HPI:   Veronique Buchanan is a 60year old female who presents for upper respiratory symptoms for  1  weeks. Patient reports sore throat, congestion. she was seen in the IC for a gum issue, and had a broken tooth, and was told she had a sinus infection, was p HISTORY      R shoulder arthroplasty.  L knee exploratory surgery, B foot surgery, uterine d and c   • TONSILLECTOMY        Family History   Problem Relation Age of Onset   • Musculo-skelatal Disorder Mother         osteopenia   • Thyroid Disorder Mother

## 2020-03-24 ENCOUNTER — TELEPHONE (OUTPATIENT)
Dept: FAMILY MEDICINE CLINIC | Facility: CLINIC | Age: 60
End: 2020-03-24

## 2020-03-24 RX ORDER — ALBUTEROL SULFATE 90 UG/1
2 AEROSOL, METERED RESPIRATORY (INHALATION) EVERY 6 HOURS PRN
Qty: 1 INHALER | Refills: 2 | Status: SHIPPED | OUTPATIENT
Start: 2020-03-24

## 2020-03-24 NOTE — TELEPHONE ENCOUNTER
Having issues with allergy has a cough, sneezing a lot, fever/chills   Last couple of days  She is asking for an inhaler not able to take a fell breath

## 2020-03-24 NOTE — TELEPHONE ENCOUNTER
Pt states she does go on inhaler around spring/fall- she is having trouble catching her breath. Pt states she has terrible allergies. She also states her psoriasis started itching again in all the same places she always does.     Pt states that even if s

## 2020-03-25 NOTE — TELEPHONE ENCOUNTER
Pt was advised of recommendation verbalized understanding    Pt was advised that inhaler was also called in- pt will return call if sxs do not resolve.

## 2020-03-25 NOTE — TELEPHONE ENCOUNTER
OK so it sounds like this is more allergies than anything else. She needs to use the nasacort 2 puffs/nostril  at bedtime.  She can try some ALlegra OTC which will not make her sleepy, if that doesn't work or she gets incomplete resolution , she can try all

## 2020-04-16 ENCOUNTER — TELEPHONE (OUTPATIENT)
Dept: FAMILY MEDICINE CLINIC | Facility: CLINIC | Age: 60
End: 2020-04-16

## 2020-04-16 DIAGNOSIS — M25.562 ARTHRALGIA OF BOTH KNEES: Primary | ICD-10-CM

## 2020-04-16 DIAGNOSIS — M25.561 ARTHRALGIA OF BOTH KNEES: Primary | ICD-10-CM

## 2020-04-16 DIAGNOSIS — M79.10 MYALGIA: ICD-10-CM

## 2020-04-16 NOTE — TELEPHONE ENCOUNTER
Patient called and started talking about arthritis pain in every joint in her body. Then started in on her knee pain, then back pain. Then started talking about COVID. Then started talking about starved rock and getting the flu.  She then said something abo

## 2020-04-16 NOTE — TELEPHONE ENCOUNTER
RN spoke with pt call started at 3:58    Pt states all of her joints are in pain- knee and back and hips.   Pt states she has been doing Meloxicam and tylenol every day for the past week and nothing is helping    Pt states last night in the middle of the ni something for pain    RN ended call at 4:10

## 2020-04-16 NOTE — TELEPHONE ENCOUNTER
Pt was not happy about being told she was bipolar- she is not bipolar.  She has mild situational depression but she is not bipolar- just because she is chatty and friendly does not mean that she is manic or bipolar      Pt then started talking about going t

## 2020-04-16 NOTE — TELEPHONE ENCOUNTER
So, I'm sure her issues are Mulitfactorial, depression /anxiety, her bipolar disorder  and I think her joint pain is  real, but she has not had any significant lab work in 7 years, and god only knows whats been going on in her body.  I will call her in a pr

## 2020-04-17 ENCOUNTER — NURSE ONLY (OUTPATIENT)
Dept: FAMILY MEDICINE CLINIC | Facility: CLINIC | Age: 60
End: 2020-04-17
Payer: COMMERCIAL

## 2020-04-17 DIAGNOSIS — M79.10 MYALGIA: ICD-10-CM

## 2020-04-17 DIAGNOSIS — M25.561 ARTHRALGIA OF BOTH KNEES: ICD-10-CM

## 2020-04-17 DIAGNOSIS — M25.562 ARTHRALGIA OF BOTH KNEES: ICD-10-CM

## 2020-04-17 PROCEDURE — 86038 ANTINUCLEAR ANTIBODIES: CPT | Performed by: FAMILY MEDICINE

## 2020-04-17 PROCEDURE — 84439 ASSAY OF FREE THYROXINE: CPT | Performed by: FAMILY MEDICINE

## 2020-04-17 PROCEDURE — 86140 C-REACTIVE PROTEIN: CPT | Performed by: FAMILY MEDICINE

## 2020-04-17 PROCEDURE — 86431 RHEUMATOID FACTOR QUANT: CPT | Performed by: FAMILY MEDICINE

## 2020-04-17 PROCEDURE — 86200 CCP ANTIBODY: CPT | Performed by: FAMILY MEDICINE

## 2020-04-17 PROCEDURE — 80050 GENERAL HEALTH PANEL: CPT | Performed by: FAMILY MEDICINE

## 2020-04-17 RX ORDER — CYCLOBENZAPRINE HCL 10 MG
10 TABLET ORAL 3 TIMES DAILY
Qty: 30 TABLET | Refills: 1 | Status: SHIPPED | OUTPATIENT
Start: 2020-04-17 | End: 2020-05-07

## 2020-04-17 RX ORDER — PREDNISONE 10 MG/1
TABLET ORAL
Qty: 18 TABLET | Refills: 0 | Status: ON HOLD | OUTPATIENT
Start: 2020-04-17 | End: 2020-09-05

## 2020-04-17 NOTE — TELEPHONE ENCOUNTER
Sent in Rx for Prednisone 3 pills for 3 days, 2 pills for 3 days, then one pill daily for 3 days    And flexeril 10 mg tid for muscle relaxer and see if that doesnt help

## 2020-04-17 NOTE — PROGRESS NOTES
Pt was in office for NV for labs per DS    2 gold, 1 mint and 1 lavender tubes collected from [facility] using butterfly needle and 1 attempt    Pt tolerated and was sent home in stable condition

## 2020-04-17 NOTE — TELEPHONE ENCOUNTER
Pt was in office for labs per DS    She asked that he PLEASE send over steroids and something for pain- she is very much in pain and had an incident in the shower this morning where her knee gave out d/t pain    Routing to provider

## 2020-04-28 ENCOUNTER — TELEPHONE (OUTPATIENT)
Dept: FAMILY MEDICINE CLINIC | Facility: CLINIC | Age: 60
End: 2020-04-28

## 2020-04-28 DIAGNOSIS — M25.552 CHRONIC HIP PAIN, LEFT: Primary | ICD-10-CM

## 2020-04-28 DIAGNOSIS — G89.29 CHRONIC HIP PAIN, LEFT: Primary | ICD-10-CM

## 2020-04-28 NOTE — TELEPHONE ENCOUNTER
OK SO I was under the impression that her pain was more generalized, and not localized to her hip. We knew about the shoulder issue, and I looked back we never xrayed her hip. If that is the case I can put an order in for her to have that done.

## 2020-04-28 NOTE — TELEPHONE ENCOUNTER
Dr. Jairo Mercado [phone]  RN spoke with pt today- pt states she is starting to feel better but her pain is subsiding and very localized. Pt states it is only in her hip joint. Pt was advised of lab results below- verbalized understanding .  Pt was provid

## 2020-04-28 NOTE — TELEPHONE ENCOUNTER
Rn spoke with pt- it is her L Hip that is painful    Pt states she had MRI at the end of her divorce - which she got cobra for 3 years and she thinks maybe she had xrays? She states during that time she tried to have every test possible done.      Pt then s

## 2020-04-28 NOTE — TELEPHONE ENCOUNTER
----- Message from Victoriano Boykin DO sent at 4/28/2020  8:40 AM CDT -----  Can notify Wiliamlydia Rodney her blood count did not show any anemia or infection  Her rheumatoid tests came back negative as did her LUPUS test  But her inflammatory markers did come back elev

## 2020-05-06 RX ORDER — MELOXICAM 15 MG/1
TABLET ORAL
Qty: 30 TABLET | Refills: 2 | Status: SHIPPED | OUTPATIENT
Start: 2020-05-06 | End: 2020-08-24

## 2020-05-19 ENCOUNTER — TELEPHONE (OUTPATIENT)
Dept: FAMILY MEDICINE CLINIC | Facility: CLINIC | Age: 60
End: 2020-05-19

## 2020-05-19 NOTE — TELEPHONE ENCOUNTER
Yes to garbage as a cause  Flexeril won;t help  biofreeze is fine if she can get it there  Ice for an acute injury, 15 min every 4-6 hours  Never checked her blood type, there's been no reason to, she can donate blood and they will tell her what her blood

## 2020-05-19 NOTE — TELEPHONE ENCOUNTER
Pt was advised of information below    She is going to tape the biofreeze to her swiffer duster and try to get it back to the area she needs it to.     Pt thanks the office for our time

## 2020-05-19 NOTE — TELEPHONE ENCOUNTER
Spoke with pt today- she states she stretched while in bed this morning and is having a pain in L shoulder. Pt states the pain took her breath away. Pt states she took some flexeril (she states it is for her teeth?) and it has not helped.     Pt state

## 2020-05-19 NOTE — TELEPHONE ENCOUNTER
Pt called her right shoulder is bothering her. She is unable to lift her arm high she thinks there is a knot in the muscle.   She was she is wanting to know if alternating heating pad and ice are going to help her with the pain? or if she should be doing mo

## 2020-05-22 ENCOUNTER — TELEPHONE (OUTPATIENT)
Dept: FAMILY MEDICINE CLINIC | Facility: CLINIC | Age: 60
End: 2020-05-22

## 2020-05-22 NOTE — TELEPHONE ENCOUNTER
Pt states her shoulder is feeling better     Pt states she was feeling better with icy/hot a few times a day.     Pt states she is feeling so much better

## 2020-06-18 ENCOUNTER — HOSPITAL ENCOUNTER (OUTPATIENT)
Age: 60
Discharge: HOME | End: 2020-06-18
Attending: FAMILY MEDICINE
Payer: COMMERCIAL

## 2020-06-18 VITALS
DIASTOLIC BLOOD PRESSURE: 60 MMHG | RESPIRATION RATE: 18 BRPM | OXYGEN SATURATION: 97 % | SYSTOLIC BLOOD PRESSURE: 100 MMHG | HEART RATE: 106 BPM | TEMPERATURE: 97 F

## 2020-06-18 DIAGNOSIS — R51.9 HEADACHE DISORDER: Primary | ICD-10-CM

## 2020-06-18 DIAGNOSIS — F41.9 ANXIETY: ICD-10-CM

## 2020-06-18 PROCEDURE — 99213 OFFICE O/P EST LOW 20 MIN: CPT

## 2020-06-18 PROCEDURE — 99214 OFFICE O/P EST MOD 30 MIN: CPT

## 2020-06-18 RX ORDER — BUTALBITAL, ACETAMINOPHEN AND CAFFEINE 50; 325; 40 MG/1; MG/1; MG/1
1-2 TABLET ORAL EVERY 6 HOURS PRN
Qty: 20 TABLET | Refills: 0 | Status: SHIPPED | OUTPATIENT
Start: 2020-06-18 | End: 2020-06-23

## 2020-06-18 RX ORDER — ACETAMINOPHEN 500 MG
1000 TABLET ORAL ONCE
Status: COMPLETED | OUTPATIENT
Start: 2020-06-18 | End: 2020-06-18

## 2020-06-18 NOTE — ED INITIAL ASSESSMENT (HPI)
Pt with c/o headache for the past 3 days. Pt takes multiple pain meds and called pmd today. Was told she can't take take any ibuprofen.   Pt c/o joint pain, has arthritis

## 2020-06-19 NOTE — ED NOTES
Pt ambulatory to car without any difficulty. Encouraged pt to continue to hydrate and go to the emergency room if symptoms return.   Pt verbalized understanding

## 2020-06-19 NOTE — ED PROVIDER NOTES
Patient Seen in: [facility] [place]      History   Patient presents with:  Headache    Stated Complaint: migraine    HPI  79-year-old female with a significant past medical history of bipolar disorder degenerative joint disease ,chronic low 97.3 °F (36.3 °C)   Temp src --    SpO2 06/18/20 1842 97 %   O2 Device 06/18/20 1842 None (Room air)       Current:/60   Pulse 106   Temp 97.3 °F (36.3 °C)   Resp 18   SpO2 97%         Physical Exam  Constitutional:       General: She is not in acute limits creatinine 0.89 thyroid profile was normal, CRP was  elevated at 2.09, NOHEILA and rheumatoid factor were negative            Disposition and Plan     Clinical Impression:  Headache disorder  (primary encounter diagnosis)  Anxiety  Plenty of fluids   Ta

## 2020-07-20 ENCOUNTER — TELEPHONE (OUTPATIENT)
Dept: FAMILY MEDICINE CLINIC | Facility: CLINIC | Age: 60
End: 2020-07-20

## 2020-07-20 ENCOUNTER — HOSPITAL ENCOUNTER (OUTPATIENT)
Age: 60
Discharge: HOME | End: 2020-07-20
Payer: COMMERCIAL

## 2020-07-20 VITALS
RESPIRATION RATE: 24 BRPM | TEMPERATURE: 98 F | SYSTOLIC BLOOD PRESSURE: 155 MMHG | HEART RATE: 88 BPM | OXYGEN SATURATION: 96 % | DIASTOLIC BLOOD PRESSURE: 78 MMHG

## 2020-07-20 DIAGNOSIS — N30.01 ACUTE CYSTITIS WITH HEMATURIA: Primary | ICD-10-CM

## 2020-07-20 LAB
POCT BILIRUBIN URINE: NEGATIVE
POCT GLUCOSE URINE: NEGATIVE MG/DL
POCT KETONE URINE: NEGATIVE MG/DL
POCT NITRITE URINE: POSITIVE
POCT PH URINE: 6 (ref 5–8)
POCT SPECIFIC GRAVITY URINE: 1.03
POCT URINE CLARITY: CLEAR
POCT URINE COLOR: YELLOW
POCT UROBILINOGEN URINE: 0.2 MG/DL

## 2020-07-20 PROCEDURE — 81002 URINALYSIS NONAUTO W/O SCOPE: CPT | Performed by: NURSE PRACTITIONER

## 2020-07-20 PROCEDURE — 99213 OFFICE O/P EST LOW 20 MIN: CPT | Performed by: NURSE PRACTITIONER

## 2020-07-20 RX ORDER — CEPHALEXIN 500 MG/1
500 CAPSULE ORAL 2 TIMES DAILY
Qty: 14 CAPSULE | Refills: 0 | Status: SHIPPED | OUTPATIENT
Start: 2020-07-20 | End: 2020-07-27

## 2020-07-20 NOTE — ED INITIAL ASSESSMENT (HPI)
Pt here with c/o urinary symptoms. Pt states starting last week pt with c/o urinary frequency. Pt noticed urinary retention. Pt c/o burning. Pt c/o chills yesterday.

## 2020-07-20 NOTE — TELEPHONE ENCOUNTER
Pt was advised she needs to come in for NV    Future Appointments   Date Time Provider Jennifer Stanley   7/20/2020  2:00 PM  [facility] [address] [facility] Glenna Selby

## 2020-07-20 NOTE — TELEPHONE ENCOUNTER
Pt called she thinks she has a UTI. She has the urge to go to the bathroom but doesn't go a lot, having some burning/ itching and having a dull ache in her bladder. She also feels like she has a fever-she's cold/shivering and body aches.  she also been [facility]

## 2020-07-20 NOTE — ED NOTES
Pt was unable to give enough urine for urine culture. Instructed pt to start antibiotic and if not feeling better she will need to follow up with physician to have another sample checked.   Pt verbalized understanding

## 2020-07-20 NOTE — ED PROVIDER NOTES
Patient Seen in: [address] [facility] [place]      History   No chief complaint on file.     Stated Complaint: URINARY SYMPTOMS    30-year-old female who presents to the immediate care with complaints of increased urine force urgency and burning x1 wee and vital signs reviewed. All other systems reviewed and negative except as noted above.     Physical Exam     ED Triage Vitals [07/20/20 1628]   /78   Pulse 88   Resp 24   Temp 97.9 °F (36.6 °C)   Temp src Temporal   SpO2 96 %   O2 Device None ( Urine Positive (*)     Leukocyte esterase urine Small (*)     All other components within normal limits     Unable to obtain a urine culture due to the low my urine patient did produce.            MDM     I have discussed with the patient and/or family the

## 2020-07-21 ENCOUNTER — TELEPHONE (OUTPATIENT)
Dept: FAMILY MEDICINE CLINIC | Facility: CLINIC | Age: 60
End: 2020-07-21

## 2020-07-21 NOTE — TELEPHONE ENCOUNTER
Rn spoke with edwige at pharmacy and advised of information below- verbalized understanding.  They will get medication ready for the stephany    RN called and LM for pt that DS approved medication and she can go to pharmacy to     Office number provide

## 2020-07-21 NOTE — TELEPHONE ENCOUNTER
Cephalexin not listed on pt allergy list    RN called pharmacy to verify what they have on file for pt. They do show that she has Cephalexin and all Cephalosporins listed as allergy.   Epic shows she was on it in 2015- no notes if she had an allergic alejandro

## 2020-07-21 NOTE — TELEPHONE ENCOUNTER
Well I'm not sure where that came from, she's taken augmentin as recently as late last year, and she has taken Keflex in the past, as well, granted it's been 5 years, but I have nothing else listed for allergy to cephalosporins?  I think she'll be OK

## 2020-07-21 NOTE — TELEPHONE ENCOUNTER
PT CALLED AND ADV WENT TO URGENT CARE YESTERDAY AND WAS TREATED FOR AN ACUTE UTI. WAS ADV THAT SOMETHING WAS CALLED IN AND ONCE PT GOT TO PHARMACY, SHE WAS ADV THAT SHE WAS ALLERGIC. PT NEEDS SOMETHING CALLED IN    Jeana Sims  [address]     PL

## 2020-07-25 ENCOUNTER — TELEPHONE (OUTPATIENT)
Dept: FAMILY MEDICINE CLINIC | Facility: CLINIC | Age: 60
End: 2020-07-25

## 2020-07-25 NOTE — TELEPHONE ENCOUNTER
[facility] and fever and ch[facility]. She has a full work up that showed perinephric stranding of the right kidney with dilated ureter possibly passed a stone. Was also septic?  And her EKG showed  Atrial fibrillaltion, but later converted

## 2020-07-25 NOTE — TELEPHONE ENCOUNTER
Pt called, cough is not going away. Pt wants to discuss with Dr. Rut Mendoza directly.    Please call pt at [phone]

## 2020-07-30 ENCOUNTER — TELEPHONE (OUTPATIENT)
Dept: FAMILY MEDICINE CLINIC | Facility: CLINIC | Age: 60
End: 2020-07-30

## 2020-07-30 NOTE — TELEPHONE ENCOUNTER
Elaine from [address] [place] [address] called, was wondering if pt is suppose to follow up with cardiology or Dr. Julio César Parnell?   Appears afib was corrected in the hospital.   Please call Elaine at [phone]

## 2020-07-30 NOTE — TELEPHONE ENCOUNTER
LM for Elaine that Carlos Alberto Olivas can follow up with DS regarding the Afib    Pt will need 40 minutes    Office number provided

## 2020-07-31 ENCOUNTER — TELEPHONE (OUTPATIENT)
Dept: FAMILY MEDICINE CLINIC | Facility: CLINIC | Age: 60
End: 2020-07-31

## 2020-07-31 NOTE — TELEPHONE ENCOUNTER
Patient states that she does not have the chills today. They come and go. Home health nurse was there today. She is going to elevate her feet and watch the sodium in her diet. Patient really had more of a question regarding medication.  - since she has ha

## 2020-07-31 NOTE — TELEPHONE ENCOUNTER
Patient advised of the information per Dr. Rut Mendoza. Patient is going to talk to home health on Monday about getting the urine test done.

## 2020-07-31 NOTE — TELEPHONE ENCOUNTER
Pt has Chills and her feet are swollen. Would like to be seen by DS. Ds doesn't have an opening for a 40 min for 2 weeks.

## 2020-07-31 NOTE — TELEPHONE ENCOUNTER
Sylvester Goldman had a pyelonephritis and if she is still not feeling well, she needs to go to the IC or ER

## 2020-08-05 ENCOUNTER — MED REC SCAN ONLY (OUTPATIENT)
Dept: FAMILY MEDICINE CLINIC | Facility: CLINIC | Age: 60
End: 2020-08-05

## 2020-08-07 ENCOUNTER — OFFICE VISIT (OUTPATIENT)
Dept: FAMILY MEDICINE CLINIC | Facility: CLINIC | Age: 60
End: 2020-08-07
Payer: COMMERCIAL

## 2020-08-07 VITALS
BODY MASS INDEX: 48.82 KG/M2 | SYSTOLIC BLOOD PRESSURE: 136 MMHG | DIASTOLIC BLOOD PRESSURE: 80 MMHG | RESPIRATION RATE: 20 BRPM | WEIGHT: 293 LBS | HEART RATE: 84 BPM | HEIGHT: 65 IN | TEMPERATURE: 99 F

## 2020-08-07 DIAGNOSIS — I51.89 DIASTOLIC DYSFUNCTION: ICD-10-CM

## 2020-08-07 DIAGNOSIS — R77.8 ELEVATED TROPONIN: ICD-10-CM

## 2020-08-07 DIAGNOSIS — R07.2 PRECORDIAL PAIN: Primary | ICD-10-CM

## 2020-08-07 DIAGNOSIS — R06.00 DYSPNEA ON EXERTION: ICD-10-CM

## 2020-08-07 PROCEDURE — 3008F BODY MASS INDEX DOCD: CPT | Performed by: FAMILY MEDICINE

## 2020-08-07 PROCEDURE — 3079F DIAST BP 80-89 MM HG: CPT | Performed by: FAMILY MEDICINE

## 2020-08-07 PROCEDURE — 3075F SYST BP GE 130 - 139MM HG: CPT | Performed by: FAMILY MEDICINE

## 2020-08-07 PROCEDURE — 99214 OFFICE O/P EST MOD 30 MIN: CPT | Performed by: FAMILY MEDICINE

## 2020-08-07 RX ORDER — HYDROCHLOROTHIAZIDE 25 MG/1
25 TABLET ORAL DAILY
Qty: 30 TABLET | Refills: 1 | Status: ON HOLD | OUTPATIENT
Start: 2020-08-07 | End: 2020-09-09

## 2020-08-07 NOTE — PROGRESS NOTES
Kim Easton is a 60year old female.   HPI:   Caden is here for follow up after he was transferred form [facility] after she presented with Chest pain and elevated troponins, she was evaluated there and discharged home she has been SO Diagnosis Date   • Depression    • DJD (degenerative joint disease), lumbar    • Left knee DJD    • Migraine    • Psoriasis    • TMJ arthritis       Social History:  Social History    Tobacco Use      Smoking status: Former Smoker        Start date: 5/10

## 2020-08-10 ENCOUNTER — TELEPHONE (OUTPATIENT)
Dept: FAMILY MEDICINE CLINIC | Facility: CLINIC | Age: 60
End: 2020-08-10

## 2020-08-10 DIAGNOSIS — R30.0 DYSURIA: Primary | ICD-10-CM

## 2020-08-10 NOTE — TELEPHONE ENCOUNTER
PT CALLED AND ADV THAT WE SHOULD BE GETTING A CALL FROM THE NURSE THAT COMES TO HER HOUSE ABOUT GETTING A URINE SAMPLE. PT ADV THE LAST 2 DAYS, SHE HAS NOT BEEN ABLE TO GO THAT MUCH.     NO FEVERS-     PLEASE ADV    THANK YOU

## 2020-08-10 NOTE — TELEPHONE ENCOUNTER
Spoke with pt- she says she can not even pee two drops- she feels she needs to get her urine culture. Pt states she is feeling very tired and she is very weak . She is just concerned about going septic like she did last time.     She said last time

## 2020-08-12 NOTE — TELEPHONE ENCOUNTER
Pt states her visiting nurse had to cancel because of the bharathi- has not been able to get urine sample    Pt also states that DS ordered a stress test- but when she tried to call they said the order was not in system    RN can see the order in the system-

## 2020-08-13 ENCOUNTER — TELEPHONE (OUTPATIENT)
Dept: FAMILY MEDICINE CLINIC | Facility: CLINIC | Age: 60
End: 2020-08-13

## 2020-08-13 NOTE — TELEPHONE ENCOUNTER
Pt called, just wants to let us know that she lost 12 lbs of water weight over last night.     Any questions please call pt [phone]

## 2020-08-13 NOTE — TELEPHONE ENCOUNTER
SANTO FROM [facility], WANTED TO KNOW IF SHE CAN COLLECT A URINE SPECIMEN TO SEE IF UTI HAS RESOLVED    PLEASE CALL SANTO   [phone]

## 2020-08-13 NOTE — TELEPHONE ENCOUNTER
Pt states that nurse came today and she lost 12 pounds of water weight over night.     Pt states that her foot has felt like one big flipper- but today she is able to feel her toes and wiggle them independently,    Pt just wanted DS to be aware that she is

## 2020-08-14 ENCOUNTER — TELEPHONE (OUTPATIENT)
Dept: FAMILY MEDICINE CLINIC | Facility: CLINIC | Age: 60
End: 2020-08-14

## 2020-08-14 NOTE — TELEPHONE ENCOUNTER
Urine culture result received and reviewed by DS.     Per Dr Audrey East, urine negative    Result to scanning

## 2020-08-21 ENCOUNTER — TELEPHONE (OUTPATIENT)
Dept: FAMILY MEDICINE CLINIC | Facility: CLINIC | Age: 60
End: 2020-08-21

## 2020-08-21 NOTE — TELEPHONE ENCOUNTER
DO Myron Spencer RN   Caller: Unspecified (Today, 12:00 PM)             So let hold her diuretic for a couple days and push the fluids a bit      Spoke with Elaine at [facility] and advised of the recommendations  She v/u

## 2020-08-24 RX ORDER — MELOXICAM 15 MG/1
TABLET ORAL
Qty: 30 TABLET | Refills: 2 | Status: SHIPPED | OUTPATIENT
Start: 2020-08-24 | End: 2020-10-31 | Stop reason: ALTCHOICE

## 2020-08-25 ENCOUNTER — TELEPHONE (OUTPATIENT)
Dept: FAMILY MEDICINE CLINIC | Facility: CLINIC | Age: 60
End: 2020-08-25

## 2020-08-28 ENCOUNTER — TELEPHONE (OUTPATIENT)
Dept: FAMILY MEDICINE CLINIC | Facility: CLINIC | Age: 60
End: 2020-08-28

## 2020-08-29 ENCOUNTER — TELEPHONE (OUTPATIENT)
Dept: FAMILY MEDICINE CLINIC | Facility: CLINIC | Age: 60
End: 2020-08-29

## 2020-08-29 RX ORDER — CYCLOBENZAPRINE HCL 10 MG
TABLET ORAL
Qty: 30 TABLET | Refills: 0 | Status: SHIPPED | OUTPATIENT
Start: 2020-08-29 | End: 2020-09-17

## 2020-08-29 NOTE — TELEPHONE ENCOUNTER
Call from patient. Wanted to know if she is able to take flexeril more than 1 nightly. states was changed from TID to QD. States this was prescribed for TMJ but she finds she is clenching her teeth during the day as well.  States she has lost 3 teeth this y

## 2020-08-29 NOTE — TELEPHONE ENCOUNTER
You can;t take more than one at bedtime, it will SNOW her and can cause other problems.  We could try something else maybe, like valium, BUt I'd prefer not to if we can help it

## 2020-08-29 NOTE — TELEPHONE ENCOUNTER
Verbal from from Dr Audrey East that patient can take 2 tablet a day, just do not take 2 tablets at night as that is too much. Verbalized understanding.

## 2020-08-29 NOTE — TELEPHONE ENCOUNTER
Pt called, has questions about cyclobenzaprine 10 MG Oral Tab directions.    Please call pt at [phone]

## 2020-08-29 NOTE — TELEPHONE ENCOUNTER
Protocol: none  Last refilled 1/3/20 #90 with 1 RF  LOV with DS 8/7/20  No future appt  Routed to PCP to advise

## 2020-09-01 ENCOUNTER — TELEPHONE (OUTPATIENT)
Dept: FAMILY MEDICINE CLINIC | Facility: CLINIC | Age: 60
End: 2020-09-01

## 2020-09-01 NOTE — TELEPHONE ENCOUNTER
Elaine from [facility] home healthcare called wanting to let the Dr know that Pt's heart rate is still running high. Elaine left her alpha monitor with Pt last week. She reviewed all the reading they were still above 100.  She is wanting to know is the dr would like to

## 2020-09-01 NOTE — TELEPHONE ENCOUNTER
Spoke with [facility] Nurse- pt keeps having increased HR. Pt is running in 120s to 154 from the readings from the past week. Pt denies feeling a racing heart but she is having increased heart rate. Pt had 7lb weight gain since visit last week.  Rn states sh

## 2020-09-01 NOTE — TELEPHONE ENCOUNTER
Well maybe she can reiterate the need for healthy diet and exercise and weight loss, and avoiding fatty and fried foods and adding salt to her food or fast foods hihg in salt. Lets continue to monitor her weight and her HR.  Await heart scan

## 2020-09-02 ENCOUNTER — TELEPHONE (OUTPATIENT)
Dept: FAMILY MEDICINE CLINIC | Facility: CLINIC | Age: 60
End: 2020-09-02

## 2020-09-04 ENCOUNTER — APPOINTMENT (OUTPATIENT)
Dept: CT IMAGING | Facility: HOSPITAL | Age: 60
DRG: 287 | End: 2020-09-04
Attending: EMERGENCY MEDICINE
Payer: COMMERCIAL

## 2020-09-04 ENCOUNTER — HOSPITAL ENCOUNTER (INPATIENT)
Facility: HOSPITAL | Age: 60
LOS: 5 days | Discharge: HOME | DRG: 287 | End: 2020-09-09
Attending: EMERGENCY MEDICINE | Admitting: HOSPITALIST
Payer: COMMERCIAL

## 2020-09-04 DIAGNOSIS — I48.91 ATRIAL FIBRILLATION WITH RAPID VENTRICULAR RESPONSE (HCC): Primary | ICD-10-CM

## 2020-09-04 DIAGNOSIS — R06.00 DYSPNEA, UNSPECIFIED TYPE: ICD-10-CM

## 2020-09-04 DIAGNOSIS — G47.33 OSA (OBSTRUCTIVE SLEEP APNEA): ICD-10-CM

## 2020-09-04 PROBLEM — E87.6 HYPOKALEMIA: Status: ACTIVE | Noted: 2020-09-04

## 2020-09-04 PROBLEM — D64.9 ANEMIA: Status: ACTIVE | Noted: 2020-09-04

## 2020-09-04 PROBLEM — R79.89 AZOTEMIA: Status: ACTIVE | Noted: 2020-09-04

## 2020-09-04 LAB
ALBUMIN SERPL-MCNC: 2.9 G/DL (ref 3.4–5)
ALBUMIN/GLOB SERPL: 0.6 {RATIO} (ref 1–2)
ALP LIVER SERPL-CCNC: 114 U/L (ref 46–118)
ALT SERPL-CCNC: 24 U/L (ref 13–56)
ANION GAP SERPL CALC-SCNC: 7 MMOL/L (ref 0–18)
APTT PPP: 27.3 SECONDS (ref 25.4–36.1)
APTT PPP: 57.2 SECONDS (ref 25.4–36.1)
AST SERPL-CCNC: 22 U/L (ref 15–37)
ATRIAL RATE: 150 BPM
BASOPHILS # BLD AUTO: 0.03 X10(3) UL (ref 0–0.2)
BASOPHILS NFR BLD AUTO: 0.4 %
BILIRUB SERPL-MCNC: 0.4 MG/DL (ref 0.1–2)
BUN BLD-MCNC: 23 MG/DL (ref 7–18)
BUN/CREAT SERPL: 21.1 (ref 10–20)
CALCIUM BLD-MCNC: 9.1 MG/DL (ref 8.5–10.1)
CHLORIDE SERPL-SCNC: 100 MMOL/L (ref 98–112)
CO2 SERPL-SCNC: 29 MMOL/L (ref 21–32)
CREAT BLD-MCNC: 1.09 MG/DL (ref 0.55–1.02)
DEPRECATED RDW RBC AUTO: 45.4 FL (ref 35.1–46.3)
EOSINOPHIL # BLD AUTO: 0.18 X10(3) UL (ref 0–0.7)
EOSINOPHIL NFR BLD AUTO: 2.4 %
ERYTHROCYTE [DISTWIDTH] IN BLOOD BY AUTOMATED COUNT: 14.7 % (ref 11–15)
GLOBULIN PLAS-MCNC: 4.8 G/DL (ref 2.8–4.4)
GLUCOSE BLD-MCNC: 111 MG/DL (ref 70–99)
HCT VFR BLD AUTO: 39.1 % (ref 35–48)
HGB BLD-MCNC: 11.9 G/DL (ref 12–16)
IMM GRANULOCYTES # BLD AUTO: 0.02 X10(3) UL (ref 0–1)
IMM GRANULOCYTES NFR BLD: 0.3 %
INR BLD: 1.08 (ref 0.89–1.11)
LYMPHOCYTES # BLD AUTO: 1.49 X10(3) UL (ref 1–4)
LYMPHOCYTES NFR BLD AUTO: 19.6 %
M PROTEIN MFR SERPL ELPH: 7.7 G/DL (ref 6.4–8.2)
MCH RBC QN AUTO: 25.8 PG (ref 26–34)
MCHC RBC AUTO-ENTMCNC: 30.4 G/DL (ref 31–37)
MCV RBC AUTO: 84.8 FL (ref 80–100)
MONOCYTES # BLD AUTO: 0.49 X10(3) UL (ref 0.1–1)
MONOCYTES NFR BLD AUTO: 6.4 %
NEUTROPHILS # BLD AUTO: 5.39 X10 (3) UL (ref 1.5–7.7)
NEUTROPHILS # BLD AUTO: 5.39 X10(3) UL (ref 1.5–7.7)
NEUTROPHILS NFR BLD AUTO: 70.9 %
NT-PROBNP SERPL-MCNC: 2622 PG/ML (ref ?–125)
OSMOLALITY SERPL CALC.SUM OF ELEC: 286 MOSM/KG (ref 275–295)
PLATELET # BLD AUTO: 305 10(3)UL (ref 150–450)
POTASSIUM SERPL-SCNC: 3.4 MMOL/L (ref 3.5–5.1)
PSA SERPL DL<=0.01 NG/ML-MCNC: 14.3 SECONDS (ref 12.4–14.6)
Q-T INTERVAL: 302 MS
QRS DURATION: 88 MS
QTC CALCULATION (BEZET): 489 MS
R AXIS: 61 DEGREES
RBC # BLD AUTO: 4.61 X10(6)UL (ref 3.8–5.3)
SODIUM SERPL-SCNC: 136 MMOL/L (ref 136–145)
T AXIS: 99 DEGREES
TROPONIN I SERPL-MCNC: <0.045 NG/ML (ref ?–0.04)
VENTRICULAR RATE: 158 BPM
WBC # BLD AUTO: 7.6 X10(3) UL (ref 4–11)

## 2020-09-04 PROCEDURE — 99223 1ST HOSP IP/OBS HIGH 75: CPT | Performed by: INTERNAL MEDICINE

## 2020-09-04 PROCEDURE — 71275 CT ANGIOGRAPHY CHEST: CPT | Performed by: EMERGENCY MEDICINE

## 2020-09-04 PROCEDURE — 99255 IP/OBS CONSLTJ NEW/EST HI 80: CPT | Performed by: INTERNAL MEDICINE

## 2020-09-04 RX ORDER — HEPARIN SODIUM 5000 [USP'U]/ML
5000 INJECTION INTRAVENOUS; SUBCUTANEOUS ONCE
Status: COMPLETED | OUTPATIENT
Start: 2020-09-04 | End: 2020-09-04

## 2020-09-04 RX ORDER — METOPROLOL TARTRATE 5 MG/5ML
5 INJECTION INTRAVENOUS ONCE
Status: COMPLETED | OUTPATIENT
Start: 2020-09-04 | End: 2020-09-04

## 2020-09-04 RX ORDER — HEPARIN SODIUM AND DEXTROSE 10000; 5 [USP'U]/100ML; G/100ML
INJECTION INTRAVENOUS CONTINUOUS
Status: DISCONTINUED | OUTPATIENT
Start: 2020-09-04 | End: 2020-09-09

## 2020-09-04 RX ORDER — LAMOTRIGINE 100 MG/1
100 TABLET ORAL 2 TIMES DAILY
Status: DISCONTINUED | OUTPATIENT
Start: 2020-09-04 | End: 2020-09-09

## 2020-09-04 RX ORDER — METOPROLOL TARTRATE 5 MG/5ML
5 INJECTION INTRAVENOUS EVERY 4 HOURS PRN
Status: DISCONTINUED | OUTPATIENT
Start: 2020-09-04 | End: 2020-09-09

## 2020-09-04 RX ORDER — DEXTROAMPHETAMINE SACCHARATE, AMPHETAMINE ASPARTATE, DEXTROAMPHETAMINE SULFATE AND AMPHETAMINE SULFATE 2.5; 2.5; 2.5; 2.5 MG/1; MG/1; MG/1; MG/1
10 TABLET ORAL 2 TIMES DAILY
Status: DISCONTINUED | OUTPATIENT
Start: 2020-09-04 | End: 2020-09-05

## 2020-09-04 RX ORDER — POTASSIUM CHLORIDE 20 MEQ/1
20 TABLET, EXTENDED RELEASE ORAL ONCE
Status: COMPLETED | OUTPATIENT
Start: 2020-09-04 | End: 2020-09-04

## 2020-09-04 RX ORDER — ASPIRIN 81 MG/1
324 TABLET, CHEWABLE ORAL ONCE
Status: COMPLETED | OUTPATIENT
Start: 2020-09-04 | End: 2020-09-04

## 2020-09-04 RX ORDER — SODIUM CHLORIDE 9 MG/ML
INJECTION, SOLUTION INTRAVENOUS CONTINUOUS
Status: ACTIVE | OUTPATIENT
Start: 2020-09-04 | End: 2020-09-04

## 2020-09-04 RX ORDER — METOCLOPRAMIDE HYDROCHLORIDE 5 MG/ML
5 INJECTION INTRAMUSCULAR; INTRAVENOUS EVERY 8 HOURS PRN
Status: DISCONTINUED | OUTPATIENT
Start: 2020-09-04 | End: 2020-09-09

## 2020-09-04 RX ORDER — ONDANSETRON 2 MG/ML
4 INJECTION INTRAMUSCULAR; INTRAVENOUS EVERY 4 HOURS PRN
Status: DISCONTINUED | OUTPATIENT
Start: 2020-09-04 | End: 2020-09-04

## 2020-09-04 RX ORDER — DILTIAZEM HYDROCHLORIDE 5 MG/ML
10 INJECTION INTRAVENOUS ONCE
Status: COMPLETED | OUTPATIENT
Start: 2020-09-04 | End: 2020-09-04

## 2020-09-04 RX ORDER — HEPARIN SODIUM AND DEXTROSE 10000; 5 [USP'U]/100ML; G/100ML
1000 INJECTION INTRAVENOUS ONCE
Status: COMPLETED | OUTPATIENT
Start: 2020-09-04 | End: 2020-09-04

## 2020-09-04 RX ORDER — ONDANSETRON 2 MG/ML
4 INJECTION INTRAMUSCULAR; INTRAVENOUS EVERY 6 HOURS PRN
Status: DISCONTINUED | OUTPATIENT
Start: 2020-09-04 | End: 2020-09-09

## 2020-09-04 RX ORDER — VENLAFAXINE HYDROCHLORIDE 75 MG/1
150 CAPSULE, EXTENDED RELEASE ORAL DAILY
Status: DISCONTINUED | OUTPATIENT
Start: 2020-09-04 | End: 2020-09-09

## 2020-09-04 RX ORDER — SODIUM CHLORIDE 9 MG/ML
1000 INJECTION, SOLUTION INTRAVENOUS ONCE
Status: COMPLETED | OUTPATIENT
Start: 2020-09-04 | End: 2020-09-04

## 2020-09-04 RX ORDER — HYDROCHLOROTHIAZIDE 25 MG/1
25 TABLET ORAL DAILY
Status: DISCONTINUED | OUTPATIENT
Start: 2020-09-04 | End: 2020-09-09

## 2020-09-04 RX ORDER — ACETAMINOPHEN 325 MG/1
650 TABLET ORAL EVERY 6 HOURS PRN
Status: DISCONTINUED | OUTPATIENT
Start: 2020-09-04 | End: 2020-09-09

## 2020-09-04 NOTE — H&P
[facility]  History and Physical     Mohsen Du Patient Status:  Emergency    10/20/1960 MRN [id number]   Location [address] [facility] Attending Johnathan Mcclellan MD   Hosp Day # 0 PCP Zakiya Garcia DO     Chief Complaint facility-administered medications on file prior to encounter.    cyclobenzaprine 10 MG Oral Tab, TAKE 1 TABLET(10 MG) BY MOUTH  DAILY as needed for spasm, Disp: 30 tablet, Rfl: 0  MELOXICAM 15 MG Oral Tab, TAKE 1 TABLET(15 MG) BY MOUTH EVERY DAY, Disp: 30 t Anicteric. Neck: No lymphadenopathy. No JVD. No carotid bruits. Respiratory: Clear to auscultation bilaterally. No wheezes. No rhonchi. Cardiovascular: S1, S2. Irregular rhythm, regular rate. No murmurs, rubs or gallops. Equal pulses.    Chest and Back:

## 2020-09-04 NOTE — ED INITIAL ASSESSMENT (HPI)
Came in today for a CLAUDIA scan and was found to be in A-Fib. They started 22 to right arm. AAO x 4. Patient denies chest pain or shortness of breath.

## 2020-09-04 NOTE — ED NOTES
Patient was settling into bed at this time. Possible this blood pressure, which doesn't match others, is altered by arm movements.

## 2020-09-04 NOTE — PLAN OF CARE
NURSING ADMISSION NOTE      Patient admitted via stretcher  Oriented to room. Safety precautions initiated. Bed in low position. Call light in reach. Pt. Received from ER. On the way to the floor, her blood pressure became very low.  Cardizem IV w

## 2020-09-04 NOTE — CONSULTS
[facility]  Cardiology Consultation    Felicia Ruth Patient Status:  Emergency    10/20/1960 MRN [id number]   Location [address] [facility] [address] Attending Iron Ceja MD   [facility] Day # 0 PCP Jami Castellon DO     Reason for Consult Clindamycin             RASH    Medications:    Current Facility-Administered Medications:   •  DILTIAZEM BOLUS FROM BAG 10 mg infusion, 10 mg, Intravenous, Q1H PRN  •  diltiazem 100mg/100ml in NaCl (CARDIZEM) 1 mg/mL premix/add-vantage, 5 mg/hr, Intrave with RVR    Impression:  Active Problems:    Hypokalemia    Anemia    Azotemia    Atrial fibrillation with rapid ventricular response (HCC)  Obesity  Undiagnosed ADRIANA    Recommendations:  - admit to floor with tele  - agree with heparin drip  - can start el

## 2020-09-04 NOTE — ED NOTES
Patient is now reporting that she is feeling shortness of breath and has been intermittently over the past month.

## 2020-09-04 NOTE — ED PROVIDER NOTES
Patient Seen in: [facility] Emergency Department      History   Patient presents with:  Arrythmia/Palpitations    Stated Complaint: afib rvr    HPI    Patient is a 69-year-old female presents emergency room with a history of being at an outside inst Physical Exam  GENERAL: Well-developed, well-nourished female sitting up breathing easily in no apparent distress. Patient is nontoxic in appearance. HEENT: Head is normocephalic, atraumatic. Pupils are 3 mm equally round and reactive to light.  O 30.4 (*)     All other components within normal limits   TROPONIN I - Normal   PROTHROMBIN TIME (PT) - Normal   PTT, ACTIVATED - Normal   CBC WITH DIFFERENTIAL WITH PLATELET    Narrative:      The following orders were created for panel order CBC WITH DIFFE bolus and drip in the ER with minimal improvement in patient's pulse rate when this was given.   Patient was given IV Lopressor x2 doses with market improvement in patient's heart rate however remained in atrial fibrillation throughout the rest of the emerg

## 2020-09-05 ENCOUNTER — APPOINTMENT (OUTPATIENT)
Dept: CV DIAGNOSTICS | Facility: HOSPITAL | Age: 60
DRG: 287 | End: 2020-09-05
Attending: INTERNAL MEDICINE
Payer: COMMERCIAL

## 2020-09-05 LAB
ANION GAP SERPL CALC-SCNC: 3 MMOL/L (ref 0–18)
APTT PPP: 36 SECONDS (ref 25.4–36.1)
APTT PPP: 45.6 SECONDS (ref 25.4–36.1)
APTT PPP: 48.6 SECONDS (ref 25.4–36.1)
BASOPHILS # BLD AUTO: 0.02 X10(3) UL (ref 0–0.2)
BASOPHILS NFR BLD AUTO: 0.3 %
BUN BLD-MCNC: 19 MG/DL (ref 7–18)
BUN/CREAT SERPL: 18.4 (ref 10–20)
CALCIUM BLD-MCNC: 8.9 MG/DL (ref 8.5–10.1)
CHLORIDE SERPL-SCNC: 106 MMOL/L (ref 98–112)
CO2 SERPL-SCNC: 29 MMOL/L (ref 21–32)
CREAT BLD-MCNC: 1.03 MG/DL (ref 0.55–1.02)
DEPRECATED RDW RBC AUTO: 45.3 FL (ref 35.1–46.3)
EOSINOPHIL # BLD AUTO: 0.27 X10(3) UL (ref 0–0.7)
EOSINOPHIL NFR BLD AUTO: 4.4 %
ERYTHROCYTE [DISTWIDTH] IN BLOOD BY AUTOMATED COUNT: 15 % (ref 11–15)
GLUCOSE BLD-MCNC: 100 MG/DL (ref 70–99)
HAV IGM SER QL: 2.3 MG/DL (ref 1.6–2.6)
HCT VFR BLD AUTO: 36.3 % (ref 35–48)
HGB BLD-MCNC: 11.1 G/DL (ref 12–16)
IMM GRANULOCYTES # BLD AUTO: 0.02 X10(3) UL (ref 0–1)
IMM GRANULOCYTES NFR BLD: 0.3 %
LYMPHOCYTES # BLD AUTO: 2.05 X10(3) UL (ref 1–4)
LYMPHOCYTES NFR BLD AUTO: 33.3 %
MCH RBC QN AUTO: 25.4 PG (ref 26–34)
MCHC RBC AUTO-ENTMCNC: 30.6 G/DL (ref 31–37)
MCV RBC AUTO: 83.1 FL (ref 80–100)
MONOCYTES # BLD AUTO: 0.55 X10(3) UL (ref 0.1–1)
MONOCYTES NFR BLD AUTO: 8.9 %
NEUTROPHILS # BLD AUTO: 3.25 X10 (3) UL (ref 1.5–7.7)
NEUTROPHILS # BLD AUTO: 3.25 X10(3) UL (ref 1.5–7.7)
NEUTROPHILS NFR BLD AUTO: 52.8 %
OSMOLALITY SERPL CALC.SUM OF ELEC: 288 MOSM/KG (ref 275–295)
PLATELET # BLD AUTO: 279 10(3)UL (ref 150–450)
POTASSIUM SERPL-SCNC: 3.3 MMOL/L (ref 3.5–5.1)
POTASSIUM SERPL-SCNC: 3.9 MMOL/L (ref 3.5–5.1)
RBC # BLD AUTO: 4.37 X10(6)UL (ref 3.8–5.3)
SARS-COV-2 RNA RESP QL NAA+PROBE: NOT DETECTED
SODIUM SERPL-SCNC: 138 MMOL/L (ref 136–145)
WBC # BLD AUTO: 6.2 X10(3) UL (ref 4–11)

## 2020-09-05 PROCEDURE — 99232 SBSQ HOSP IP/OBS MODERATE 35: CPT | Performed by: INTERNAL MEDICINE

## 2020-09-05 PROCEDURE — 99233 SBSQ HOSP IP/OBS HIGH 50: CPT | Performed by: INTERNAL MEDICINE

## 2020-09-05 PROCEDURE — 78452 HT MUSCLE IMAGE SPECT MULT: CPT | Performed by: INTERNAL MEDICINE

## 2020-09-05 PROCEDURE — 93017 CV STRESS TEST TRACING ONLY: CPT | Performed by: INTERNAL MEDICINE

## 2020-09-05 PROCEDURE — 93018 CV STRESS TEST I&R ONLY: CPT | Performed by: INTERNAL MEDICINE

## 2020-09-05 RX ORDER — IBUPROFEN 400 MG/1
TABLET ORAL EVERY 6 HOURS PRN
Status: DISCONTINUED | OUTPATIENT
Start: 2020-09-05 | End: 2020-09-05 | Stop reason: SDUPTHER

## 2020-09-05 RX ORDER — IBUPROFEN 400 MG/1
400 TABLET ORAL EVERY 6 HOURS PRN
Status: DISCONTINUED | OUTPATIENT
Start: 2020-09-05 | End: 2020-09-09

## 2020-09-05 RX ORDER — IBUPROFEN 600 MG/1
600 TABLET ORAL EVERY 6 HOURS PRN
Status: DISCONTINUED | OUTPATIENT
Start: 2020-09-05 | End: 2020-09-09

## 2020-09-05 RX ORDER — POTASSIUM CHLORIDE 20 MEQ/1
40 TABLET, EXTENDED RELEASE ORAL EVERY 4 HOURS
Status: COMPLETED | OUTPATIENT
Start: 2020-09-05 | End: 2020-09-05

## 2020-09-05 RX ORDER — HEPARIN SODIUM 5000 [USP'U]/ML
3000 INJECTION INTRAVENOUS; SUBCUTANEOUS ONCE
Status: COMPLETED | OUTPATIENT
Start: 2020-09-05 | End: 2020-09-05

## 2020-09-05 NOTE — RESPIRATORY THERAPY NOTE
Visited PT for ADRIANA protocol. Pt states that she doesn't wear a CPAP or O2. States that she knows that she needs to get an appt for a sleep study. Will continue to monitor.

## 2020-09-05 NOTE — PROGRESS NOTES
[facility]  Cardiology Progress Note    Katlin Cunningham Patient Status:  Inpatient    10/20/1960 MRN [id number]   [facility] 8NE-A Attending Nadia Terrazas MD   [facility] Day # 1 PCP Alondra Whitaker DO     Subjective:  Patient states she fe hydrochlorothiazide  25 mg Oral Daily   • lamoTRIgine  100 mg Oral BID   • Venlafaxine HCl ER  150 mg Oral Daily     • diltiazem Stopped (09/04/20 1537)   • Continuous dose Heparin infusion 1,200 Units/hr (09/05/20 0723)       Assessment:  • Afib with RVR,

## 2020-09-05 NOTE — PROGRESS NOTES
[facility]  Progress Note     Alejandro Frost Patient Status:  Inpatient    10/20/1960 MRN [id number]   [facility] Attending Russ Guzmán MD   [address] Day # 1 PCP Austin Onofre DO     Chief Complaint: palpitations/dizziness 14.3   INR 1.08       Recent Labs   Lab 09/04/20  1143   TROP <0.045            Imaging: Imaging data reviewed in Epic.     Medications:   • amphetamine-dextroamphetamine  10 mg Oral BID   • hydrochlorothiazide  25 mg Oral Daily   • lamoTRIgine  100 mg Oral

## 2020-09-05 NOTE — PLAN OF CARE
Assumed care of patient at 1. Alert and oriented x4--very fast talking at times, jumps around in conversation, No C/O chest pain or SOB, SPO2 on RA 98%, Heart rate A fib 80s--controlled. Breath sounds clear. Heparin gtt infusing per orders.  Bed is jose daniel

## 2020-09-05 NOTE — PLAN OF CARE
Pt. Is alert and oriented times four. Lungs clear on auscultation. Stress test diet due to lexiscan stress ordered for today. Pt. Is on IV heparin now at 1200 units/hr. She is now in sinus rhythm on monitor. She has no c/o pain. Generalized edema noted.  Po

## 2020-09-06 LAB
APTT PPP: 51 SECONDS (ref 25.4–36.1)
PLATELET # BLD AUTO: 267 10(3)UL (ref 150–450)

## 2020-09-06 PROCEDURE — 99233 SBSQ HOSP IP/OBS HIGH 50: CPT | Performed by: INTERNAL MEDICINE

## 2020-09-06 PROCEDURE — 99232 SBSQ HOSP IP/OBS MODERATE 35: CPT | Performed by: INTERNAL MEDICINE

## 2020-09-06 RX ORDER — FUROSEMIDE 10 MG/ML
40 INJECTION INTRAMUSCULAR; INTRAVENOUS ONCE
Status: COMPLETED | OUTPATIENT
Start: 2020-09-06 | End: 2020-09-06

## 2020-09-06 RX ORDER — HEPARIN SODIUM 10000 [USP'U]/100ML
INJECTION, SOLUTION INTRAVENOUS
Status: DISPENSED
Start: 2020-09-06 | End: 2020-09-07

## 2020-09-06 RX ORDER — POLYETHYLENE GLYCOL 3350 17 G/17G
17 POWDER, FOR SOLUTION ORAL DAILY
Status: DISCONTINUED | OUTPATIENT
Start: 2020-09-06 | End: 2020-09-09

## 2020-09-06 RX ORDER — DOCUSATE SODIUM 100 MG/1
100 CAPSULE, LIQUID FILLED ORAL 2 TIMES DAILY
Status: DISCONTINUED | OUTPATIENT
Start: 2020-09-06 | End: 2020-09-09

## 2020-09-06 NOTE — PROGRESS NOTES
[facility]  Progress Note     Mckenzie Hyatt Patient Status:  Inpatient    10/20/1960 MRN [id number]   [facility] 8NE-A Attending Jazlyn Garcia MD   [facility] Day # 2 PCP Hussain Barros DO     Chief Complaint: palpitations/dizziness 7.7  --   --        Estimated Creatinine Clearance: 52.3 mL/min (A) (based on SCr of 1.03 mg/dL (H)).     Recent Labs   Lab 09/04/20  1143   PTP 14.3   INR 1.08       Recent Labs   Lab 09/04/20  1143   TROP <0.045            Imaging: Imaging data reviewed i

## 2020-09-06 NOTE — PROGRESS NOTES
[facility]  Progress Note    Jaylen Vallejo Patient Status:  Inpatient    10/20/1960 MRN [id number]   [facility] 8NE-A Attending Kaleigh Hollis DO   Hosp Day # 2 PCP William Gutierrez DO       Assessment and Plan:  Patient Active Antibiotics         Clindamycin             RASH    Physical Exam:  Blood pressure 107/64, pulse 84, temperature 97.7 °F (36.5 °C), temperature source Oral, resp. rate 20, height 5' 5\" (1.651 m), weight (!) 315 lb 14.7 oz (143.3 kg), SpO2 98 %.   Telemetry

## 2020-09-06 NOTE — PLAN OF CARE
Patient alert and oriented. Appears anxious and is extremely emotional when discussing current living/health conditions. On room air, lung sounds are clear. NSR,HR 88. Heparin gtt infusing. Continent of bowel and bladder.  Reports pain in bilateral legs, se

## 2020-09-06 NOTE — PLAN OF CARE
Received patient this am aaox4- anxious about being in hospital- emotional support as needed. SR on tele. PAC  Short of breath with exertion- left lung diminished +1 bilateral lower leg edema.  Patient showed me her log of daily weights- patient states she

## 2020-09-07 LAB
ANION GAP SERPL CALC-SCNC: 7 MMOL/L (ref 0–18)
APTT PPP: 49 SECONDS (ref 25.4–36.1)
APTT PPP: 62.2 SECONDS (ref 25.4–36.1)
ATRIAL RATE: 79 BPM
BUN BLD-MCNC: 13 MG/DL (ref 7–18)
BUN/CREAT SERPL: 10.9 (ref 10–20)
CALCIUM BLD-MCNC: 9.3 MG/DL (ref 8.5–10.1)
CHLORIDE SERPL-SCNC: 102 MMOL/L (ref 98–112)
CO2 SERPL-SCNC: 30 MMOL/L (ref 21–32)
CREAT BLD-MCNC: 1.19 MG/DL (ref 0.55–1.02)
GLUCOSE BLD-MCNC: 128 MG/DL (ref 70–99)
OSMOLALITY SERPL CALC.SUM OF ELEC: 290 MOSM/KG (ref 275–295)
P AXIS: 81 DEGREES
P-R INTERVAL: 184 MS
PLATELET # BLD AUTO: 270 10(3)UL (ref 150–450)
POTASSIUM SERPL-SCNC: 3.9 MMOL/L (ref 3.5–5.1)
Q-T INTERVAL: 442 MS
QRS DURATION: 90 MS
QTC CALCULATION (BEZET): 506 MS
R AXIS: 41 DEGREES
SODIUM SERPL-SCNC: 139 MMOL/L (ref 136–145)
T AXIS: 55 DEGREES
VENTRICULAR RATE: 79 BPM

## 2020-09-07 PROCEDURE — 99232 SBSQ HOSP IP/OBS MODERATE 35: CPT | Performed by: INTERNAL MEDICINE

## 2020-09-07 RX ORDER — DILTIAZEM HYDROCHLORIDE 180 MG/1
180 CAPSULE, EXTENDED RELEASE ORAL DAILY
Status: DISCONTINUED | OUTPATIENT
Start: 2020-09-07 | End: 2020-09-09

## 2020-09-07 RX ORDER — SODIUM CHLORIDE 9 MG/ML
INJECTION, SOLUTION INTRAVENOUS
Status: ACTIVE | OUTPATIENT
Start: 2020-09-07 | End: 2020-09-07

## 2020-09-07 RX ORDER — FUROSEMIDE 10 MG/ML
40 INJECTION INTRAMUSCULAR; INTRAVENOUS ONCE
Status: COMPLETED | OUTPATIENT
Start: 2020-09-07 | End: 2020-09-07

## 2020-09-07 RX ORDER — ASPIRIN 325 MG
325 TABLET, DELAYED RELEASE (ENTERIC COATED) ORAL DAILY
Status: DISCONTINUED | OUTPATIENT
Start: 2020-09-08 | End: 2020-09-09

## 2020-09-07 NOTE — PLAN OF CARE
Assumed care at 0730. Patient a/o.vss. Sinus rhythm on tele. O2 saturations 95 . Introductions made and wipe board updated. All AM meds given. Call light within reach and bed in low postion. Patient tolerating ambulation without oxygen.  Heartrate  w Evaluate fluid balance, assess for edema, trend weights  Outcome: Progressing  Goal: Absence of cardiac arrhythmias or at baseline  Description  INTERVENTIONS:  - Continuous cardiac monitoring, monitor vital signs, obtain 12 lead EKG if indicated  - Evalua

## 2020-09-07 NOTE — PROGRESS NOTES
[facility]  Cardiology Progress Note    Mohsen Du Patient Status:  Inpatient    10/20/1960 MRN [id number]   [facility] 8NE-A Attending Unknown DO Diego   Hosp Day # 3 PCP Zakiya Garcia DO     Subjective:  Feeling better dose Heparin infusion 1,800 Units/hr (09/07/20 0700)       Assessment:  · Paroxysmal atrial fibrillation, now in SR, with occ runs PAT on cardizem PO and heparin gtt  · Abnormal nuc, plan for [facility] tomorrow   · LE edema improving on lasix  · Undiagnosed ADRIANA

## 2020-09-07 NOTE — PHYSICAL THERAPY NOTE
PHYSICAL THERAPY EVALUATION - INPATIENT     Room Number: 1151/1865-M  Evaluation Date: 9/7/2020  Type of Evaluation: Initial  Physician Order: PT Eval and Treat    Presenting Problem: afib  Reason for Therapy: Mobility Dysfunction and Discharge Plann surgery, uterine d and c   • TONSILLECTOMY         HOME SITUATION  Type of Home: Other (Comment)(duplex)   Home Layout: One level  Stairs to Enter : 0     Stairs to [employer]: 0       Lives With: Alone  Drives: Yes  Patient Owned Equipment: Cane; Other (Comme Location: Right leg  BP Method: Automatic  Patient Position: Sitting    O2 WALK  SPO2 on Room Air at Rest: 96               AM-PAC '6-Clicks' INPATIENT SHORT FORM - BASIC MOBILITY  How much difficulty does the patient currently have. ..  -   Turning over in addressed    ASSESSMENT   Patient is a 60year old female admitted on 9/4/2020 for afib rvr. Pertinent comorbidities and personal factors impacting therapy include recent admission to outside hospital and B knee arthritis with pain.   In this PT evaluation

## 2020-09-07 NOTE — PLAN OF CARE
Patient alert and oriented. On room air, lungs are diminished. NSR on tele, HR 85. 2+ edema to bilateral extremities, per pt swelling is improving. Patient dyspneic with ambulating, only able to walk short distances.  Bowel sounds present, voiding frequency

## 2020-09-07 NOTE — PROGRESS NOTES
[facility]  Progress Note     Claudio Vaca Patient Status:  Inpatient    10/20/1960 MRN [id number]   [facility] 8NE-A Attending Leeanna Rosario MD   [facility] Day # 3 PCP Sami Albert DO     Chief Complaint: palpitations/dizziness --    TP 7.7  --   --        Estimated Creatinine Clearance: 52.3 mL/min (A) (based on SCr of 1.03 mg/dL (H)).     Recent Labs   Lab 09/04/20  1143   PTP 14.3   INR 1.08       Recent Labs   Lab 09/04/20  1143   TROP <0.045            Imaging: Imaging data

## 2020-09-08 ENCOUNTER — TELEPHONE (OUTPATIENT)
Dept: FAMILY MEDICINE CLINIC | Facility: CLINIC | Age: 60
End: 2020-09-08

## 2020-09-08 ENCOUNTER — APPOINTMENT (OUTPATIENT)
Dept: INTERVENTIONAL RADIOLOGY/VASCULAR | Facility: HOSPITAL | Age: 60
DRG: 287 | End: 2020-09-08
Attending: INTERNAL MEDICINE
Payer: COMMERCIAL

## 2020-09-08 ENCOUNTER — APPOINTMENT (OUTPATIENT)
Dept: CV DIAGNOSTICS | Facility: HOSPITAL | Age: 60
DRG: 287 | End: 2020-09-08
Attending: NURSE PRACTITIONER
Payer: COMMERCIAL

## 2020-09-08 LAB
ANION GAP SERPL CALC-SCNC: 6 MMOL/L (ref 0–18)
APTT PPP: 72.4 SECONDS (ref 25.4–36.1)
BUN BLD-MCNC: 13 MG/DL (ref 7–18)
BUN/CREAT SERPL: 11.7 (ref 10–20)
CALCIUM BLD-MCNC: 9.4 MG/DL (ref 8.5–10.1)
CHLORIDE SERPL-SCNC: 100 MMOL/L (ref 98–112)
CHOLEST SMN-MCNC: 181 MG/DL (ref ?–200)
CO2 SERPL-SCNC: 31 MMOL/L (ref 21–32)
CREAT BLD-MCNC: 1.11 MG/DL (ref 0.55–1.02)
GLUCOSE BLD-MCNC: 96 MG/DL (ref 70–99)
HDLC SERPL-MCNC: 76 MG/DL (ref 40–59)
LDLC SERPL CALC-MCNC: 82 MG/DL (ref ?–100)
NONHDLC SERPL-MCNC: 105 MG/DL (ref ?–130)
OSMOLALITY SERPL CALC.SUM OF ELEC: 284 MOSM/KG (ref 275–295)
POTASSIUM SERPL-SCNC: 3.8 MMOL/L (ref 3.5–5.1)
SARS-COV-2 RNA RESP QL NAA+PROBE: NOT DETECTED
SODIUM SERPL-SCNC: 137 MMOL/L (ref 136–145)
TRIGL SERPL-MCNC: 113 MG/DL (ref 30–149)
VLDLC SERPL CALC-MCNC: 23 MG/DL (ref 0–30)

## 2020-09-08 PROCEDURE — 99232 SBSQ HOSP IP/OBS MODERATE 35: CPT | Performed by: INTERNAL MEDICINE

## 2020-09-08 PROCEDURE — 99152 MOD SED SAME PHYS/QHP 5/>YRS: CPT | Performed by: INTERNAL MEDICINE

## 2020-09-08 PROCEDURE — 4A023N7 MEASUREMENT OF CARDIAC SAMPLING AND PRESSURE, LEFT HEART, PERCUTANEOUS APPROACH: ICD-10-PCS | Performed by: INTERNAL MEDICINE

## 2020-09-08 PROCEDURE — B2111ZZ FLUOROSCOPY OF MULTIPLE CORONARY ARTERIES USING LOW OSMOLAR CONTRAST: ICD-10-PCS | Performed by: INTERNAL MEDICINE

## 2020-09-08 PROCEDURE — 93458 L HRT ARTERY/VENTRICLE ANGIO: CPT | Performed by: INTERNAL MEDICINE

## 2020-09-08 RX ORDER — ALPRAZOLAM 0.5 MG/1
1 TABLET ORAL ONCE
Status: COMPLETED | OUTPATIENT
Start: 2020-09-08 | End: 2020-09-08

## 2020-09-08 RX ORDER — HEPARIN SODIUM 5000 [USP'U]/ML
INJECTION, SOLUTION INTRAVENOUS; SUBCUTANEOUS
Status: COMPLETED
Start: 2020-09-08 | End: 2020-09-08

## 2020-09-08 RX ORDER — MIDAZOLAM HYDROCHLORIDE 1 MG/ML
INJECTION INTRAMUSCULAR; INTRAVENOUS
Status: COMPLETED
Start: 2020-09-08 | End: 2020-09-08

## 2020-09-08 RX ORDER — VERAPAMIL HYDROCHLORIDE 2.5 MG/ML
INJECTION, SOLUTION INTRAVENOUS
Status: COMPLETED
Start: 2020-09-08 | End: 2020-09-08

## 2020-09-08 RX ORDER — SODIUM CHLORIDE 9 MG/ML
INJECTION, SOLUTION INTRAVENOUS CONTINUOUS
Status: ACTIVE | OUTPATIENT
Start: 2020-09-08 | End: 2020-09-08

## 2020-09-08 RX ORDER — DIPHENHYDRAMINE HYDROCHLORIDE 50 MG/ML
INJECTION INTRAMUSCULAR; INTRAVENOUS
Status: COMPLETED
Start: 2020-09-08 | End: 2020-09-08

## 2020-09-08 RX ORDER — ATORVASTATIN CALCIUM 20 MG/1
20 TABLET, FILM COATED ORAL NIGHTLY
Status: DISCONTINUED | OUTPATIENT
Start: 2020-09-08 | End: 2020-09-09

## 2020-09-08 RX ORDER — LIDOCAINE HYDROCHLORIDE 10 MG/ML
INJECTION, SOLUTION EPIDURAL; INFILTRATION; INTRACAUDAL; PERINEURAL
Status: COMPLETED
Start: 2020-09-08 | End: 2020-09-08

## 2020-09-08 NOTE — PLAN OF CARE
Assumed care at 0730. Pt is A &O x4 and very anxious. Pt changes subject frequently and talks very rapidly. Pt is on RA at 96% oxygen. Lungs are diminished bilaterally. Dyspnea on exertion and at rest. NSR on tele with occasional PVCs.  Abdomen soft, round Assess for signs of decreased coronary artery perfusion - ex.  Angina  - Evaluate fluid balance, assess for edema, trend weights  Outcome: Progressing  Goal: Absence of cardiac arrhythmias or at baseline  Description  INTERVENTIONS:  - Continuous cardiac mo

## 2020-09-08 NOTE — PROCEDURES
[facility]    [facility] Cardiac Cath Procedure Note  Alix Walsh Patient Status:  Inpatient    10/20/1960 MRN [id number]   Location [address] [facility] Attending Ricco López, [address] [facility] Day # 4 PCP Jose Arnold DO prepped and draped in the usual sterile fashion. Lidocaine 1% was used to infiltrate the right arm for local anesthesia.   Next we then gently advanced a Glidewire which was gently navigated with the support of a radial TIG catheter into the ascending aorta

## 2020-09-08 NOTE — PROGRESS NOTES
[facility]    Electrophysiology Follow Up Note      Autumn Myrick Patient Status:  Inpatient    10/20/1960 MRN [id number]   [facility] 8NE-A Attending Sourav Asp Day # 4 PCP Prince Hannon DO lamoTRIgine (LAMICTAL) tab 100 mg, 100 mg, Oral, BID  •  Venlafaxine HCl ER (EFFEXOR-XR) 24 hr cap 150 mg, 150 mg, Oral, Daily      Physical Exam:  Blood pressure 108/56, pulse 84, temperature 98.2 °F (36.8 °C), temperature source Oral, resp.  rate 18, heig rhythm after spontaneous conversion to sinus rhythm.      1) SOB  - NM stress test with distal anterior defect  - she is previously scheduled for LHC    2) paroxysmal atrial fibrillation  - currently sinus rhythm  - if no CAD on LHC, then CHADVASC score wou

## 2020-09-08 NOTE — PROGRESS NOTES
[facility]  Progress Note     Kathryn Kern Patient Status:  Inpatient    10/20/1960 MRN [id number]   [facility] 8NE-A Attending Gio Morfin MD   [facility] Day # 4 PCP Jordan Crockett DO     Chief Complaint: palpitations/dizziness 3. 3* 3.9 3.9 3.8    106  --  102 100   CO2 29.0 29.0  --  30.0 31.0   ALKPHO 114  --   --   --   --    AST 22  --   --   --   --    ALT 24  --   --   --   --    BILT 0.4  --   --   --   --    TP 7.7  --   --   --   --        Estimated Creatinine Brenda

## 2020-09-08 NOTE — PAYOR COMM NOTE
--------------  ADMISSION REVIEW     Payor: [address] [place] [address] ALONSO/SUSAN  Subscriber #:  [id number]  Authorization Number: Matheus Newman    Admit date: 9/4/20  Admit time: 36       Admitting Physician: Lowell Harper MD  Attending Physician:  Yanira Holloway No pertinent past medical history. No pertinent past surgical history. No pertinent social history. Review of Systems    Positive for stated complaint: afib rvr  Other systems are as noted in HPI.   Constitutional and COMP METABOLIC PANEL (14) - Abnormal; Notable for the following components:       Result Value    Glucose 111 (*)     Potassium 3.4 (*)     BUN 23 (*)     Creatinine 1.09 (*)     BUN/CREA Ratio 21.1 (*)     GFR, Non- 55 (*)     Albumin 2.9 CONCLUSION:  Negative for acute pulmonary embolism. Scattered areas of atelectasis. Nonspecific 2.9 cm lesion in the liver, not fully characterized. Suggest non emergency outpatient ultrasound follow-up to better characterize this.   It could be a yadi 9/4/2020  2:26 pm    Follow-up:  No follow-up provider specified.         Medications Prescribed:  Current Discharge Medication List                       Present on Admission  Date Reviewed: 8/7/2020          ICD-10-CM Noted POA    Anemia D64.9 9/4/2020 Marbella Alejo • Psoriasis    • TMJ arthritis         Past Surgical History:   Past Surgical History:   Procedure Laterality Date   • OTHER SURGICAL HISTORY      R shoulder arthroplasty.  L knee exploratory surgery, B foot surgery, uterine d and c   • TONSILLECTOMY lamoTRIgine 100 MG Oral Tab, Take 100 mg by mouth 2 (two) times daily. , Disp: , Rfl: 2  amphetamine-dextroamphetamine 10 MG Oral Tab, Take 10 mg by mouth 2 (two) times daily. , Disp: , Rfl: 0  ALPRAZolam 1 MG Oral Tab, , Disp: , Rfl: 4  Eletriptan Hydrobrom Recent Labs   Lab 09/04/20  1143   TROP <0.045       Imaging: Imaging data reviewed in Epic. ASSESSMENT / PLAN:     1. Atrial fibrillation with RVR - CT negative for PE  1. Diagnosed in 07/2020 OSH  2.  Heparin drip, plan for Eliquis if nuclear stress In ED a CT chest was negative for PE, pericardial effusion, coronary calcifications or aortic dissection.     With initiation of IV cardizem her HR's now are in the 70's and she feels much better.   She is very nervous.     She was started on IV heparin by 09/04/20 : (!) 321 lb 14 oz  08/07/20 : (!) 322 lb  01/14/20 : (!) 320 lb 9.6 oz        Physical Exam:   General: Alert and oriented x 3. No apparent distress. No respiratory or constitutional distress. HEENT: Normocephalic, anicteric sclera, neck supple. Thank you for allowing me to participate in the care of your patient.     Ernestine Zelaya MD  9/4/2020  2:51 PM           9/5  Ricco López DO   Physician   HOSPITALIST   Progress Notes   Signed   Date of Service:  9/5/2020  7:08 AM GFRNAA 55* 59*   CA 9.1 8.9   ALB 2.9*  --     138   K 3.4* 3.3*    106   CO2 29.0 29.0   ALKPHO 114  --    AST 22  --    ALT 24  --    BILT 0.4  --    TP 7.7  --          Estimated Creatinine Clearance: 52.3 mL/min (A) (based on SCr of 1.03 mg S: felt some anxiousness overnight, but currently feeling better and denies any chest pain, SOB, or palpitations.  Some constipation and requesting stool softeners     Review of Systems:   10 point ROS completed and was negative, except for pertinent positi    Imaging: Imaging data reviewed in Epic.     Medications:   • dilTIAZem HCl  30 mg Oral 4 times per day   • hydrochlorothiazide  25 mg Oral Daily   • lamoTRIgine  100 mg Oral BID   • Venlafaxine HCl ER  150 mg Oral Daily         ASSESSMENT / PLAN:        Temp:  [97.5 °F (36.4 °C)-98.2 °F (36.8 °C)] 97.7 °F (36.5 °C)  Pulse:  [74-92] 74  Resp:  [18-20] 20  BP: (104-127)/(55-98) 106/55     Wt Readings from Last 6 Encounters:  09/07/20 : (!) 313 lb 15 oz (142.4 kg)  08/07/20 : (!) 322 lb (146.1 kg)  01/14/20 • lamoTRIgine  100 mg Oral BID   • Venlafaxine HCl ER  150 mg Oral Daily         ASSESSMENT / PLAN:         1. Paroxysmal Atrial fibrillation initally with RVR - CT negative for PE  1. Diagnosed at OSH 07/2020   2. Rate controlled on dilt PO  3.  Heparin  BP: (103-130)/(56-75) 108/56     Wt Readings from Last 6 Encounters:  09/07/20 : (!) 313 lb 15 oz (142.4 kg)  08/07/20 : (!) 322 lb (146.1 kg)  01/14/20 : (!) 320 lb 9.6 oz (145.4 kg)  12/14/19 : 275 lb (124.7 kg)  06/03/19 : (!) 316 lb 12.8 oz (143.7 kg) • dilTIAZem HCl ER Coated Beads  180 mg Oral Daily   • PEG 3350  17 g Oral Daily   • docusate sodium  100 mg Oral BID   • hydrochlorothiazide  25 mg Oral Daily   • lamoTRIgine  100 mg Oral BID   • Venlafaxine HCl ER  150 mg Oral Daily         ASSESSMENT / 9/8/2020 1005 Given 100 mg Oral Joshua [facility], RN    9/7/2020 2106 Given 100 mg Oral Essie Peter, RN      ibuprofen (MOTRIN) tab 600 mg     Date Action Dose Route User    9/8/2020 0423 Given 600 mg Oral Kelly Lawson, RN    9/7/2020 2112 Gi

## 2020-09-08 NOTE — PROGRESS NOTES
Pt received from cath lab, placed on tele, Right wrist incision with TR Band, soft on palpation around TR Band, radial pulses palpable, vital signs and site assessment per post procedure orders.  Patient instructed not to flex right wrist or move right hand

## 2020-09-08 NOTE — CM/SW NOTE
09/08/20 1300   CM/SW Referral Data   Referral Source Physician   Reason for Referral Discharge planning  [facility])   Informant Patient   Social History   Suicidal Ideation/Depression/Mental Health Issues   (Takes anti anxiety medications ie: Xanax, Effexo like to use them again. DMJ recently discharged. F2F/order entered for home health services. Sent referral to [facility] [place] in [address]  &  to remain available and supportive for discharge planning needs.

## 2020-09-08 NOTE — RESPIRATORY THERAPY NOTE
ADRIANA Equipment Usage Summary :            Set Mode :AUTO CPAP W FLEX          Usage in Hours:3;36          90% Pressure (EPAP) : 9.2           90% Insp Pressure (IPAP);           AHI : 9.2          Supplemental Oxygen LPM :          Auto cpap ( MAX PRESSURE

## 2020-09-08 NOTE — PLAN OF CARE
Pt denies c/o malaise or cardiac symptoms. A&Ox4, anxiety, inability to focus, freq reorientation needed. Lungs diminished bilaterally, dyspnea at rest and with exertion, on room air, did not tolerate bipap. Pt NSR on monitor with occasional PACs.  Abdomen Absence of cardiac arrhythmias or at baseline  Description  INTERVENTIONS:  - Continuous cardiac monitoring, monitor vital signs, obtain 12 lead EKG if indicated  - Evaluate effectiveness of antiarrhythmic and heart rate control medications as ordered  - I

## 2020-09-08 NOTE — TELEPHONE ENCOUNTER
Pt called wanting to let  know that she is at [facility]. She went to get her lexiscan on 9/4.  While having it done she did the first part of it fine and went to do the 2nd part and the nurse was putting something into her the IV and noticed t

## 2020-09-08 NOTE — PROGRESS NOTES
Cardiology Update:    - Patient scheduled for LHC  -Discussed risk benefits alternatives of LHC with patient.   Discussed risk which include but are not limited to bleeding, infection, MI, CVA/TIA, kidney injury from IV contrast dye, allergic reaction, sign

## 2020-09-09 ENCOUNTER — APPOINTMENT (OUTPATIENT)
Dept: CV DIAGNOSTICS | Facility: HOSPITAL | Age: 60
DRG: 287 | End: 2020-09-09
Attending: NURSE PRACTITIONER
Payer: COMMERCIAL

## 2020-09-09 VITALS
OXYGEN SATURATION: 95 % | WEIGHT: 293 LBS | HEART RATE: 78 BPM | DIASTOLIC BLOOD PRESSURE: 66 MMHG | RESPIRATION RATE: 18 BRPM | HEIGHT: 65 IN | TEMPERATURE: 98 F | BODY MASS INDEX: 48.82 KG/M2 | SYSTOLIC BLOOD PRESSURE: 119 MMHG

## 2020-09-09 PROBLEM — R06.00 DYSPNEA, UNSPECIFIED TYPE: Status: RESOLVED | Noted: 2020-09-04 | Resolved: 2020-09-09

## 2020-09-09 PROBLEM — I48.91 ATRIAL FIBRILLATION WITH RAPID VENTRICULAR RESPONSE (HCC): Status: RESOLVED | Noted: 2020-09-04 | Resolved: 2020-09-09

## 2020-09-09 PROBLEM — D64.9 ANEMIA: Status: RESOLVED | Noted: 2020-09-04 | Resolved: 2020-09-09

## 2020-09-09 PROBLEM — E87.6 HYPOKALEMIA: Status: RESOLVED | Noted: 2020-09-04 | Resolved: 2020-09-09

## 2020-09-09 PROBLEM — R79.89 AZOTEMIA: Status: RESOLVED | Noted: 2020-09-04 | Resolved: 2020-09-09

## 2020-09-09 LAB
ANION GAP SERPL CALC-SCNC: 1 MMOL/L (ref 0–18)
APTT PPP: 63.3 SECONDS (ref 25.4–36.1)
BASOPHILS # BLD AUTO: 0.03 X10(3) UL (ref 0–0.2)
BASOPHILS NFR BLD AUTO: 0.4 %
BUN BLD-MCNC: 13 MG/DL (ref 7–18)
BUN/CREAT SERPL: 10.6 (ref 10–20)
CALCIUM BLD-MCNC: 9.4 MG/DL (ref 8.5–10.1)
CHLORIDE SERPL-SCNC: 104 MMOL/L (ref 98–112)
CO2 SERPL-SCNC: 32 MMOL/L (ref 21–32)
CREAT BLD-MCNC: 1.23 MG/DL (ref 0.55–1.02)
DEPRECATED RDW RBC AUTO: 46.7 FL (ref 35.1–46.3)
EOSINOPHIL # BLD AUTO: 0.42 X10(3) UL (ref 0–0.7)
EOSINOPHIL NFR BLD AUTO: 5.6 %
ERYTHROCYTE [DISTWIDTH] IN BLOOD BY AUTOMATED COUNT: 15.5 % (ref 11–15)
GLUCOSE BLD-MCNC: 106 MG/DL (ref 70–99)
HCT VFR BLD AUTO: 40.9 % (ref 35–48)
HGB BLD-MCNC: 12.6 G/DL (ref 12–16)
IMM GRANULOCYTES # BLD AUTO: 0.03 X10(3) UL (ref 0–1)
IMM GRANULOCYTES NFR BLD: 0.4 %
LYMPHOCYTES # BLD AUTO: 2.23 X10(3) UL (ref 1–4)
LYMPHOCYTES NFR BLD AUTO: 29.9 %
MCH RBC QN AUTO: 25.8 PG (ref 26–34)
MCHC RBC AUTO-ENTMCNC: 30.8 G/DL (ref 31–37)
MCV RBC AUTO: 83.8 FL (ref 80–100)
MONOCYTES # BLD AUTO: 0.67 X10(3) UL (ref 0.1–1)
MONOCYTES NFR BLD AUTO: 9 %
NEUTROPHILS # BLD AUTO: 4.09 X10 (3) UL (ref 1.5–7.7)
NEUTROPHILS # BLD AUTO: 4.09 X10(3) UL (ref 1.5–7.7)
NEUTROPHILS NFR BLD AUTO: 54.7 %
OSMOLALITY SERPL CALC.SUM OF ELEC: 285 MOSM/KG (ref 275–295)
PLATELET # BLD AUTO: 294 10(3)UL (ref 150–450)
POTASSIUM SERPL-SCNC: 4.2 MMOL/L (ref 3.5–5.1)
RBC # BLD AUTO: 4.88 X10(6)UL (ref 3.8–5.3)
SODIUM SERPL-SCNC: 137 MMOL/L (ref 136–145)
WBC # BLD AUTO: 7.5 X10(3) UL (ref 4–11)

## 2020-09-09 PROCEDURE — 93306 TTE W/DOPPLER COMPLETE: CPT | Performed by: NURSE PRACTITIONER

## 2020-09-09 PROCEDURE — 99239 HOSP IP/OBS DSCHRG MGMT >30: CPT | Performed by: INTERNAL MEDICINE

## 2020-09-09 PROCEDURE — 99232 SBSQ HOSP IP/OBS MODERATE 35: CPT | Performed by: INTERNAL MEDICINE

## 2020-09-09 RX ORDER — ATORVASTATIN CALCIUM 20 MG/1
20 TABLET, FILM COATED ORAL NIGHTLY
Qty: 30 TABLET | Refills: 5 | Status: SHIPPED | OUTPATIENT
Start: 2020-09-09 | End: 2021-07-06

## 2020-09-09 RX ORDER — DILTIAZEM HYDROCHLORIDE 180 MG/1
180 CAPSULE, COATED, EXTENDED RELEASE ORAL DAILY
Qty: 30 CAPSULE | Refills: 5 | Status: SHIPPED | OUTPATIENT
Start: 2020-09-10 | End: 2020-10-31

## 2020-09-09 RX ORDER — HYDROCHLOROTHIAZIDE 25 MG/1
25 TABLET ORAL DAILY
Qty: 30 TABLET | Refills: 1 | Status: SHIPPED | OUTPATIENT
Start: 2020-09-09 | End: 2021-04-29

## 2020-09-09 NOTE — PLAN OF CARE
1500- Cleared for dc home, Scripts all called into pt's pharmacy. Sent with paper copies. DC instructions thoroughly reviewed with pt. Hx of bipolar and has flight of ideas. Difficult to stay on subject. Reiterated dc instructions several times.  Blossom Pimentel Monitor vital signs, rhythm, and trends  - Monitor for bleeding, hypotension and signs of decreased cardiac output  - Evaluate effectiveness of vasoactive medications to optimize hemodynamic stability  - Monitor arterial and/or venous puncture sites for bl

## 2020-09-09 NOTE — PLAN OF CARE
Left heart cath today via right radial artery. TR band closure. Right wrist remains free from hematoma. Gauze and tegaderm applied. Instructed patient regarding limited use of her right wrist. Motrin given for right shoulder pain.    She remains in NSR with

## 2020-09-09 NOTE — DISCHARGE SUMMARY
[facility] HOSPITALIST  DISCHARGE SUMMARY     Raymundo Mi Patient Status:  Inpatient    10/20/1960 MRN [id number]   [facility] 8NE-A Attending Papo Mcallister, [address] [facility] Day # 5 PCP Sherryle Curb, DO     Date of Admission: 2020  Date of D to get an outpatient sleep study for suspected ADRIANA. Discharged home on long-acting diltiazem, aspirin and atorvastatin aspirin as the new medications. Other home medications will be continued on discharge.   On day of discharge, patient felt well with no hours as needed for Wheezing. Quantity:  1 Inhaler  Refills:  2     ALPRAZolam 1 MG Tabs  Commonly known as:  XANAX       Refills:  4     amphetamine-dextroamphetamine 10 MG Tabs  Commonly known as:  ADDERALL      Take 10 mg by mouth 2 (two) times daily. your doctor or nurse    Bring a paper prescription for each of these medications  · aspirin 325 MG Tbec  · atorvastatin 20 MG Tabs  · dilTIAZem HCl ER Coated Beads 180 MG Cp24         [place] No [place] substances prescribed this admission.     Sigifredo No

## 2020-09-09 NOTE — PROGRESS NOTES
[facility]    Electrophysiology Follow Up Note      Dane oYung Patient Status:  Inpatient    10/20/1960 MRN [id number]   [facility] 8NE-A Attending Adriana Lam, [address] [facility] Day # 5 PCP Tiffany Culver,      Reason Daily  •  lamoTRIgine (LAMICTAL) tab 100 mg, 100 mg, Oral, BID  •  Venlafaxine HCl ER (EFFEXOR-XR) 24 hr cap 150 mg, 150 mg, Oral, Daily      Physical Exam:  Blood pressure 119/59, pulse 71, temperature 97.5 °F (36.4 °C), temperature source Oral, resp.  rat was short in duration and self limited.     1) paroxysmal atrial fibrillation with RVR  - first diagnosis, currently sinus rhythm  - recommend sleep study, CPAP and weight loss regimen  - given low burden, will start with diltiazem for rate control  - CHADS

## 2020-09-10 ENCOUNTER — TELEPHONE (OUTPATIENT)
Dept: FAMILY MEDICINE CLINIC | Facility: CLINIC | Age: 60
End: 2020-09-10

## 2020-09-10 NOTE — TELEPHONE ENCOUNTER
RN spoke with pt - she went on about her stay in the hospital and how happy she is now to be home.     Pt was advised she needs f/u visit with Card[facility]) and with DS    RN scheduled pt for next week at 2pm- pt is going to call cardiology to get

## 2020-09-10 NOTE — PAYOR COMM NOTE
--------------  DISCHARGE REVIEW    Payor: Trula Fleischer POS/SUSAN  Subscriber #:  [id number]  Authorization Number: Itzel Thomas    Admit date: 9/4/20  Admit time:  3356  Discharge Date: 9/9/2020  3:37 PM     Admitting Physician: Heber Orozco MD  Attending Ph rate and so diltiazem drip was discontinued. She underwent Lexiscan nuclear stress test.  It was abnormal and showed mild mid to distal anterior wall reversibility and suspicion of fixed apical defect.   Cardiology then recommended coronary angiogram which Tbec  Start taking on:  September 10, 2020      Take 1 tablet (325 mg total) by mouth daily. Quantity:  30 tablet  Refills:  11     atorvastatin 20 MG Tabs  Commonly known as:  LIPITOR      Take 1 tablet (20 mg total) by mouth nightly.    Quantity:  30 ta MG) BY MOUTH EVERY DAY   Quantity:  30 tablet  Refills:  2     OXcarbazepine 150 MG Tabs  Commonly known as:  TRILEPTAL      Take 150 mg by mouth 2 (two) times daily.    Refills:  2     Venlafaxine HCl  MG Cp24  Commonly known as:  EFFEXOR-XR      Nuris Rase Krista Hernández DO on 9/9/2020  1:59 PM         REVIEWER COMMENTS

## 2020-09-10 NOTE — TELEPHONE ENCOUNTER
She has been in hospital 9/4 just was sent home they told her to see both doctors does she need to see Dr Jame oh or the Cardologist within a week  Please advise

## 2020-09-11 ENCOUNTER — PATIENT OUTREACH (OUTPATIENT)
Dept: CASE MANAGEMENT | Age: 60
End: 2020-09-11

## 2020-09-11 DIAGNOSIS — Z02.9 ENCOUNTERS FOR ADMINISTRATIVE PURPOSE: ICD-10-CM

## 2020-09-11 PROCEDURE — 1111F DSCHRG MED/CURRENT MED MERGE: CPT

## 2020-09-11 NOTE — PROGRESS NOTES
Initial Post Discharge Follow Up   Discharge Date: 9/9/20  Contact Date: 9/11/2020    Consent Verification:  Assessment Completed With: Patient  HIPAA Verified?   Yes    Discharge Dx:  Atrial fibrillation with rapid ventricular response    Was TCC ordere total) by mouth daily. 30 tablet 11   • atorvastatin 20 MG Oral Tab Take 1 tablet (20 mg total) by mouth nightly.  30 tablet 5   • cyclobenzaprine 10 MG Oral Tab TAKE 1 TABLET(10 MG) BY MOUTH  DAILY as needed for spasm 30 tablet 0   • MELOXICAM 15 MG Oral T Yes   What services:   Rehab, CHF, Stroke, PT, OT, Speech, Other: [facility]-resumption of care  (NCM) (If HH was ordered) Has HH been set up? No, I s/w my nurse yesterday and she is coming today or tomorrow. DME ordered at D/C? No       Needs post D/C: placed:  No      [x]  Discharge Summary, Discharge medications reviewed/discussed/and reconciled against outpatient medications with patient,  and orders reviewed and discussed. Any changes or updates to medications and or orders sent to PCP.

## 2020-09-17 NOTE — PROGRESS NOTES
Felicia Ruth is a 60year old female.   HPI:   Cecily Hernandez is here for hospital follow up after she was admitted for high output failure, and atrial fibrillation, she was diuresed and had a cardiac cath after she had an abnormal thallium test. She has mild c • Migraines    • Psoriasis    • Renal disorder    • Shortness of breath    • TMJ arthritis    • Visual impairment       Social History:  Social History    Tobacco Use      Smoking status: Former Smoker        Start date: 5/10/2013      Smokeless tobacco: plan.  The patient is asked to return in 1 month.

## 2020-09-19 ENCOUNTER — TELEPHONE (OUTPATIENT)
Dept: FAMILY MEDICINE CLINIC | Facility: CLINIC | Age: 60
End: 2020-09-19

## 2020-10-06 ENCOUNTER — TELEPHONE (OUTPATIENT)
Dept: FAMILY MEDICINE CLINIC | Facility: CLINIC | Age: 60
End: 2020-10-06

## 2020-10-06 RX ORDER — AMOXICILLIN AND CLAVULANATE POTASSIUM 875; 125 MG/1; MG/1
1 TABLET, FILM COATED ORAL 2 TIMES DAILY
Qty: 20 TABLET | Refills: 0 | Status: SHIPPED | OUTPATIENT
Start: 2020-10-06 | End: 2020-10-16

## 2020-10-06 NOTE — TELEPHONE ENCOUNTER
Pt states she has a cough- she thinks it is allergies but she feels she can't be sure.       She states she has a good friend who is a good ole gun toting boy who just moved to [place] and was telling her how there are no masks down there and now people a

## 2020-10-06 NOTE — TELEPHONE ENCOUNTER
Benadryl is fine, will also send in an ABX, because it soun ds like she needs one, [facility] cardiology Dr. Celia Ugarte is fine, happy birthday

## 2020-10-06 NOTE — TELEPHONE ENCOUNTER
Pt would like to discuss her cough. She states she coughs up 'thick phlegm\". Pt would like to know what medication she can take OTC that will not interfere with her new and current meds.       She also wanted to let Dr. Roland Almaguer know she has an appointment w

## 2020-10-08 ENCOUNTER — TELEPHONE (OUTPATIENT)
Dept: FAMILY MEDICINE CLINIC | Facility: CLINIC | Age: 60
End: 2020-10-08

## 2020-10-14 ENCOUNTER — TELEPHONE (OUTPATIENT)
Dept: FAMILY MEDICINE CLINIC | Facility: CLINIC | Age: 60
End: 2020-10-14

## 2020-10-14 NOTE — TELEPHONE ENCOUNTER
Her cough is out of control can not sleep shortness of breath low temp coughing up phe that is [employer] thinking about going to ER

## 2020-10-15 ENCOUNTER — TELEPHONE (OUTPATIENT)
Dept: FAMILY MEDICINE CLINIC | Facility: CLINIC | Age: 60
End: 2020-10-15

## 2020-10-16 NOTE — TELEPHONE ENCOUNTER
Patient calls with concerns about her breathing. Review of the chart shows complaints of severe cough yesterday. Patient was referred to the emergency room but did not go.   She found an albuterol inhaler and use that which seemed to relieve much of her s

## 2020-10-22 ENCOUNTER — TELEPHONE (OUTPATIENT)
Dept: FAMILY MEDICINE CLINIC | Facility: CLINIC | Age: 60
End: 2020-10-22

## 2020-10-22 NOTE — TELEPHONE ENCOUNTER
Home health nurse Elaine called to say pt was at Dr. Burley Cowden (cardiologist) office yesterday. They did an EKG and pt was in A-fib. She has been admitted into [facility]. Nurse wanted to let Dr Jose Aguilar know.       Thank you

## 2020-10-27 ENCOUNTER — MED REC SCAN ONLY (OUTPATIENT)
Dept: FAMILY MEDICINE CLINIC | Facility: CLINIC | Age: 60
End: 2020-10-27

## 2020-10-29 ENCOUNTER — TELEPHONE (OUTPATIENT)
Dept: FAMILY MEDICINE CLINIC | Facility: CLINIC | Age: 60
End: 2020-10-29

## 2020-10-29 ENCOUNTER — MOBILE ENCOUNTER (OUTPATIENT)
Dept: FAMILY MEDICINE CLINIC | Facility: CLINIC | Age: 60
End: 2020-10-29

## 2020-10-29 RX ORDER — FLUCONAZOLE 100 MG/1
100 TABLET ORAL DAILY
Qty: 7 TABLET | Refills: 0 | Status: SHIPPED | OUTPATIENT
Start: 2020-10-29 | End: 2020-10-30

## 2020-10-29 NOTE — TELEPHONE ENCOUNTER
Elaine from [facility] called,Pt has been using her inhaler more and now appears to have thrush. Do we want to prescribe something for pt? Her mouth is sore. Pt came home from the hospital this past Sunday.   Please call Elaine at [phone]

## 2020-10-29 NOTE — TELEPHONE ENCOUNTER
RN spoke with [facility] nurse Elaine    Pt was admitted to [facility] last week for Afib- was d/c to home on Sunday. Elaine resumed [facility] services on Monday. She states pt has dry, hacky, persistent cough and is using inhaler more often. RN denies wheezing or crackles.

## 2020-10-29 NOTE — TELEPHONE ENCOUNTER
Let’s send in diflucan 100 mg daily for 7 days      Medication placed per provider orders    [facility] RN cielo was advised- verbalized understanding

## 2020-10-30 ENCOUNTER — TELEPHONE (OUTPATIENT)
Dept: FAMILY MEDICINE CLINIC | Facility: CLINIC | Age: 60
End: 2020-10-30

## 2020-10-30 NOTE — TELEPHONE ENCOUNTER
Celeste Jones from [facility] called, Has questions about a script we sent over, possible interation with warfarin and also has question about another prescription pt is taking.  Please call Celeste Jones at [phone]

## 2020-10-30 NOTE — TELEPHONE ENCOUNTER
RN returned call to pharmacy    Fluconazole has interaction with Warfarin- we need to cancel that script    DS sent over Nystatin s/s yesterday instead for possible thrush    Pharmacist was advised- verbalized understanding

## 2020-10-31 ENCOUNTER — TELEPHONE (OUTPATIENT)
Dept: FAMILY MEDICINE CLINIC | Facility: CLINIC | Age: 60
End: 2020-10-31

## 2020-10-31 RX ORDER — DILTIAZEM HYDROCHLORIDE 180 MG/1
180 CAPSULE, COATED, EXTENDED RELEASE ORAL 2 TIMES DAILY
Qty: 30 CAPSULE | Refills: 5 | COMMUNITY
Start: 2020-10-31 | End: 2021-04-29

## 2020-10-31 RX ORDER — BLOOD SUGAR DIAGNOSTIC
STRIP MISCELLANEOUS
Qty: 100 STRIP | Refills: 1 | Status: SHIPPED | OUTPATIENT
Start: 2020-10-31 | End: 2021-02-22

## 2020-10-31 RX ORDER — BLOOD-GLUCOSE METER
1 EACH MISCELLANEOUS 2 TIMES DAILY
Qty: 1 KIT | Refills: 0 | Status: SHIPPED | OUTPATIENT
Start: 2020-10-31 | End: 2021-10-31

## 2020-10-31 RX ORDER — AMIODARONE HYDROCHLORIDE 200 MG/1
200 TABLET ORAL DAILY
Refills: 0 | COMMUNITY
Start: 2020-10-31 | End: 2021-07-06

## 2020-10-31 NOTE — TELEPHONE ENCOUNTER
Januvia 50mg daily is what pt was started on in hospital    [place] RN nurse states that Kelly Amaya started this med when she was inpatient    Should we send over glucometer?     RN was unable to find an A1C on her    Other new meds:  Warfarin 2mg every night- cale

## 2020-10-31 NOTE — TELEPHONE ENCOUNTER
SANTO FROM [facility] CALLED AND WANTED TO KNOW IF  WANTED TO ORDER A GLUCOMETER. LAST HOSP VISIT PT WAS DX'D WITH DIABETES. PT IS ON DIABETIC MEDS.     PLEASE ADV    THANK YOU

## 2020-11-03 ENCOUNTER — MED REC SCAN ONLY (OUTPATIENT)
Dept: FAMILY MEDICINE CLINIC | Facility: CLINIC | Age: 60
End: 2020-11-03

## 2020-11-04 ENCOUNTER — TELEPHONE (OUTPATIENT)
Dept: FAMILY MEDICINE CLINIC | Facility: CLINIC | Age: 60
End: 2020-11-04

## 2020-11-09 ENCOUNTER — TELEPHONE (OUTPATIENT)
Dept: FAMILY MEDICINE CLINIC | Facility: CLINIC | Age: 60
End: 2020-11-09

## 2020-11-09 RX ORDER — CEFDINIR 300 MG/1
300 CAPSULE ORAL 2 TIMES DAILY
Qty: 20 CAPSULE | Refills: 0 | Status: SHIPPED | OUTPATIENT
Start: 2020-11-09 | End: 2020-11-19

## 2020-11-09 NOTE — TELEPHONE ENCOUNTER
Umberto Felix from [address] [place] [address] called, [employer] HR is bouncing around 134-137, BP 93/65, and pt has a productive cough. No fever-temp is 97.7, O2 96%-but short of breath. Please call Umberto Felix at [phone], transferred call to [facility].

## 2020-11-09 NOTE — TELEPHONE ENCOUNTER
Discussed with Marck Servin. Patient has no fever. T97.7 as of now. O2 was 96%. States lungs sound good but patient complains of intermittent wheezing. Marck Servin is more concerned of her nagging cough and pulse.   Confirms patient taking the following medications:

## 2020-11-16 ENCOUNTER — TELEPHONE (OUTPATIENT)
Dept: FAMILY MEDICINE CLINIC | Facility: CLINIC | Age: 60
End: 2020-11-16

## 2020-11-16 NOTE — TELEPHONE ENCOUNTER
[employer] called- she has a call into pt psych office because she is concerned about pt. She states pt is in  manic phase right now. She states pt gets so worked up and starts coughing.     At one point she was \"hallucinating\" that her grand daughter was

## 2020-11-16 NOTE — TELEPHONE ENCOUNTER
Jammie from [facility] home healthcare called she would like to speak with nurse about PT's cough and physic issues. She has tried to reach to PT's physiatrist but has not heard back from them.  You can reach Jammie at [phone]

## 2020-11-17 NOTE — TELEPHONE ENCOUNTER
SPoke to Dr. Armendariz Needle office, relayed that she is having manic issues, and hallucinating, talking to people who are not there, apparently neighbors called police for some isues in the house

## 2020-11-19 ENCOUNTER — TELEPHONE (OUTPATIENT)
Dept: FAMILY MEDICINE CLINIC | Facility: CLINIC | Age: 60
End: 2020-11-19

## 2020-11-19 NOTE — TELEPHONE ENCOUNTER
Elaine states pt took herself to the ER 2 days ago- and then they sent her home. She did not get admitted- ED notes in Epic    Elaine said she tried to follow up with pt yesterday-pt is not answering the phone or text messages.   HH RN went to pt home and her car

## 2020-11-19 NOTE — TELEPHONE ENCOUNTER
Elaine from [facility] called, pt was discharged from [facility] the other day, they have been trying to contact pt with no results. Pt does not answer her phone, texts or knocks at her door. Do we have any new information on pt?    Please call Elaine at [phone]

## 2020-11-23 RX ORDER — SITAGLIPTIN 50 MG/1
TABLET, FILM COATED ORAL
Qty: 30 TABLET | Refills: 0 | Status: SHIPPED | OUTPATIENT
Start: 2020-11-23 | End: 2020-12-29

## 2020-11-23 NOTE — TELEPHONE ENCOUNTER
Diabetic Medication Protocol [id number]/21/2020 02:38 PM   HgBA1C procedure resulted in past 6 months Protocol Details    Last HgBA1C < 7.5     Microalbumin procedure in past 12 months or taking ACE/ARB     Appointment in past 6 or next 3 months      LOV: 9/

## 2020-11-24 ENCOUNTER — TELEPHONE (OUTPATIENT)
Dept: FAMILY MEDICINE CLINIC | Facility: CLINIC | Age: 60
End: 2020-11-24

## 2020-11-24 NOTE — TELEPHONE ENCOUNTER
Owen is calling to let DS know that she was finally about to get ahold of her. Pt was in and out of [facility] as an observation pt. She was have Hallucinations, No med changes. She did follow up with his psych doc.    Owen was able to get her recertified for

## 2020-11-24 NOTE — TELEPHONE ENCOUNTER
Spoke with [facility] RN- advised we agree to getting pt re certified for [facility]

## 2020-11-27 ENCOUNTER — TELEPHONE (OUTPATIENT)
Dept: FAMILY MEDICINE CLINIC | Facility: CLINIC | Age: 60
End: 2020-11-27

## 2020-11-27 NOTE — TELEPHONE ENCOUNTER
She fell out of bed she thinks she broke her ribs. She is in extreme pain what can she do please call.

## 2020-11-27 NOTE — TELEPHONE ENCOUNTER
Denies difficulty breathing, she landed on her left ribs, fell out of her bed. She was able to get back onto her bed. Has not used any ice, just laying still, waiting for advice. Doesn't think the area is bruised.

## 2020-11-27 NOTE — TELEPHONE ENCOUNTER
Maybe she just bruised them, but if her pain is that bad she can go to the IC and get an xray done, in the meantime,I'd take some advil, 600-800 mg every 8 hours with food, and ice

## 2020-12-01 ENCOUNTER — OFFICE VISIT (OUTPATIENT)
Dept: FAMILY MEDICINE CLINIC | Facility: CLINIC | Age: 60
End: 2020-12-01
Payer: COMMERCIAL

## 2020-12-01 VITALS
SYSTOLIC BLOOD PRESSURE: 118 MMHG | RESPIRATION RATE: 24 BRPM | DIASTOLIC BLOOD PRESSURE: 68 MMHG | OXYGEN SATURATION: 98 % | HEART RATE: 93 BPM | TEMPERATURE: 98 F

## 2020-12-01 DIAGNOSIS — R05.9 COUGH: Primary | ICD-10-CM

## 2020-12-01 DIAGNOSIS — R05.9 COUGH: ICD-10-CM

## 2020-12-01 DIAGNOSIS — J98.01 BRONCHOSPASM: Primary | ICD-10-CM

## 2020-12-01 DIAGNOSIS — F41.9 ANXIETY: ICD-10-CM

## 2020-12-01 DIAGNOSIS — J98.01 BRONCHOSPASM: ICD-10-CM

## 2020-12-01 DIAGNOSIS — R06.00 DYSPNEA ON EXERTION: ICD-10-CM

## 2020-12-01 DIAGNOSIS — E66.01 MORBID OBESITY WITH BMI OF 50.0-59.9, ADULT (HCC): ICD-10-CM

## 2020-12-01 PROBLEM — G47.33 OSA (OBSTRUCTIVE SLEEP APNEA): Status: ACTIVE | Noted: 2020-12-01

## 2020-12-01 PROBLEM — E11.9 DIABETES (HCC): Status: ACTIVE | Noted: 2020-11-17

## 2020-12-01 PROCEDURE — 99214 OFFICE O/P EST MOD 30 MIN: CPT | Performed by: FAMILY MEDICINE

## 2020-12-01 PROCEDURE — 3074F SYST BP LT 130 MM HG: CPT | Performed by: FAMILY MEDICINE

## 2020-12-01 PROCEDURE — 3078F DIAST BP <80 MM HG: CPT | Performed by: FAMILY MEDICINE

## 2020-12-01 RX ORDER — ALBUTEROL SULFATE 2.5 MG/3ML
2.5 SOLUTION RESPIRATORY (INHALATION) 4 TIMES DAILY PRN
Qty: 50 VIAL | Refills: 0 | Status: SHIPPED | OUTPATIENT
Start: 2020-12-01

## 2020-12-01 RX ORDER — AZITHROMYCIN 250 MG/1
TABLET, FILM COATED ORAL
Qty: 6 TABLET | Refills: 0 | Status: SHIPPED | OUTPATIENT
Start: 2020-12-01 | End: 2020-12-06

## 2020-12-01 RX ORDER — LANCETS
EACH MISCELLANEOUS
COMMUNITY
Start: 2020-11-02

## 2020-12-01 RX ORDER — WARFARIN SODIUM 4 MG/1
TABLET ORAL
COMMUNITY
Start: 2020-11-12 | End: 2021-04-15

## 2020-12-01 RX ORDER — WARFARIN SODIUM 4 MG/1
4 TABLET ORAL
COMMUNITY
Start: 2020-11-12 | End: 2021-04-15

## 2020-12-01 RX ORDER — BUMETANIDE 1 MG/1
TABLET ORAL
COMMUNITY
Start: 2020-11-21 | End: 2021-07-06

## 2020-12-01 RX ORDER — DILTIAZEM HYDROCHLORIDE 180 MG/1
180 CAPSULE, EXTENDED RELEASE ORAL DAILY
COMMUNITY
Start: 2020-11-06 | End: 2021-04-29

## 2020-12-01 NOTE — TELEPHONE ENCOUNTER
Patient seen by Dr Carl Jaeger in office today. Needs nebulizer and Albuterol neb solution   Use QID while awake for wheezing and shortness of breath  Patient started on Zithromax, is on coumadin and monitored by home health.     Spoke with Patria Hester at [facility] home

## 2020-12-01 NOTE — PROGRESS NOTES
HPI:   Wilfredo Oneil is a 63year old female who presents for upper respiratory symptoms for  3  weeks. Patient reports sore throat, congestion, clear colored nasal discharge, dry cough, chest pain from coughing, wheezing.  Shew was hospitalilzed for cou 6 (six) hours as needed for Wheezing. 1 Inhaler 2   • clotrimazole-betamethasone 1-0.05 % External Cream Apply to the affected areas bid for 10-14 days 60 g 3   • OXcarbazepine 150 MG Oral Tab Take 150 mg by mouth 2 (two) times daily.   2   • lamoTRIgine 10 rashes,no suspicious lesions  EYES:PERRLA, EOMI, normal optic disk,conjunctiva are clear  HEENT: atraumatic, normocephalic,ears and throat are clear, but very dry and there is yellow mucous on the posterior pharynx  NECK: supple,no adenopathy,no bruits  CELIA

## 2020-12-03 ENCOUNTER — TELEPHONE (OUTPATIENT)
Dept: FAMILY MEDICINE CLINIC | Facility: CLINIC | Age: 60
End: 2020-12-03

## 2020-12-03 NOTE — TELEPHONE ENCOUNTER
[address] has been going to try and give patient nebulizer treatments. They were under the impression that the nebulizer is at [facility] ready for patient to . They have told patient for two days to get the nebulizer.  Patient texted home healt

## 2020-12-03 NOTE — TELEPHONE ENCOUNTER
Spoke with Elaine with HH- she verified that Neb was sent to [facility]aden     She states she told pt to go pick it up today prior to [facility] nurse visit so that way elaine can teach her how to use it,    Elaine verbalized that she will give pt teaching today

## 2020-12-10 ENCOUNTER — TELEPHONE (OUTPATIENT)
Dept: FAMILY MEDICINE CLINIC | Facility: CLINIC | Age: 60
End: 2020-12-10

## 2020-12-10 NOTE — TELEPHONE ENCOUNTER
PT CALLED TO SEE IF SHE COULD GET IN EARLIER FOR HER APPT. SHE SAID SHE CANNOT WAIT UNTIL  NEXT WEEK. SHE IS COMING IN FOR KNEE PAIN. SHE WOULD LIKE TO COME IN ASAP.     PT WAS CRYING SAYING SHE WAS HAVING HALLUCINATIONS THINKING SOMEONE IS OUTSIDE HER

## 2020-12-10 NOTE — TELEPHONE ENCOUNTER
Pt states she woke up and saw to people scratching her window screen- she then saw a dog. Pt states she could not tell if they were real of hallucinations.   Pt states she went Saturday night to be evaluated- she states that she thinks this is a by produ

## 2020-12-11 ENCOUNTER — TELEPHONE (OUTPATIENT)
Dept: FAMILY MEDICINE CLINIC | Facility: CLINIC | Age: 60
End: 2020-12-11

## 2020-12-11 RX ORDER — ACETAMINOPHEN AND CODEINE PHOSPHATE 300; 30 MG/1; MG/1
1 TABLET ORAL EVERY 4 HOURS PRN
Qty: 20 TABLET | Refills: 0 | Status: SHIPPED | OUTPATIENT
Start: 2020-12-11 | End: 2021-05-13

## 2020-12-11 NOTE — TELEPHONE ENCOUNTER
Elaine from [facility] [address] called to make sure DS knows about pt ER Visit and that she is not taking the ABX for the UTI that was given to her at the ER. She tried to warn patient that if she doesn't take the meds that she could end up in the [facility] again.

## 2020-12-11 NOTE — TELEPHONE ENCOUNTER
Patient called clinic from parking lot. This RN and Echo Botello went out to speak with patient. Patient is having a lot of pain in her right rib cage. States she fell last week and was seen in ER. Was diagnosed with bruised ribs and UTI.   Was given thom

## 2020-12-11 NOTE — TELEPHONE ENCOUNTER
Sent in #20 Tylenol #3 1 po Q4-6 prn pain , keep appt next week with me, also she really needs to contact her Pysch Dr. Jemima Mclaughlin and let him know what has been going on with her

## 2020-12-11 NOTE — TELEPHONE ENCOUNTER
Landen Anderson, [facility]  Michele Mohs, RN             If she feels her mother is a risk to herself or others, due to her hallucinations, she can complete a petition. This will allow her to be evaluated in the emergency room for psychiatric stability.  Her be

## 2020-12-11 NOTE — TELEPHONE ENCOUNTER
So can we contact Rufus De La Garza, maybe Nhung Nieves our LCSW? and see if she can give us some direction on this?

## 2020-12-11 NOTE — TELEPHONE ENCOUNTER
Amy Asencio DO   Physician   Specialty:  Family Medicine   Telephone Encounter   Signed   Creation Time:  12/11/2020  2:57 PM            Signed       Sent in #20 Tylenol #3 1 po Q4-6 prn pain , keep appt next week with me, also she really needs to conta

## 2020-12-11 NOTE — TELEPHONE ENCOUNTER
Patient notified and verbalized understanding. States she just picked up the Cefdinir and naproxen from pharmacy. She will take as prescribed and f/u with DS Tuesday as scheduled for reevaluation.

## 2020-12-11 NOTE — TELEPHONE ENCOUNTER
Patient daughter Bhavna Wylie notified and verbalized understanding. States she is in [place]. She is going to see if her brother will be able to take patient or call ambulance for evaluation.     [facility] patient has not been acting her self recently and she is

## 2020-12-11 NOTE — TELEPHONE ENCOUNTER
Pt's Daughter is calling and wants to talk to nurse about pt having hallucinations. Pt actually called the  this week because she thought she saw 2 men standing at the foot of her bed. They sent her to ER and she has an UTI.    She is seeing people

## 2020-12-11 NOTE — TELEPHONE ENCOUNTER
Patient daughter notified and verbalized understanding. States she does not think her brother would be able to get patient to agree to go to [facility] for evaluation. Daughter is concerned about patient behavior. States patient is hallucinating.  Think

## 2020-12-11 NOTE — TELEPHONE ENCOUNTER
I’m wondering if there is a way we could get YAHAIRA to intervene and try and get her admitted , I think she has a son locally and if he could get her there, then they would be able to help.  Collette talked to her innumerable times, she does not think she an issue

## 2020-12-11 NOTE — TELEPHONE ENCOUNTER
She should be checking it once a day, we have taken care of the ABX issue, can somone find me her Psychs number, so I can contac thim directly this is getting ridiculous

## 2020-12-12 ENCOUNTER — TELEPHONE (OUTPATIENT)
Dept: FAMILY MEDICINE CLINIC | Facility: CLINIC | Age: 60
End: 2020-12-12

## 2020-12-17 ENCOUNTER — TELEPHONE (OUTPATIENT)
Dept: FAMILY MEDICINE CLINIC | Facility: CLINIC | Age: 60
End: 2020-12-17

## 2020-12-17 NOTE — TELEPHONE ENCOUNTER
SANTO FROM [facility] CALLED BACK AND ADV THAT SHE TALKED TO THE DAUGHTER AND THE PT IN INPATIENT AT [facility].     PT WAS ADMITTED ON Saturday    THANK YOU

## 2020-12-17 NOTE — TELEPHONE ENCOUNTER
Arsen from [facility] States that she can not locate pt. Last time she saw pt was on Thursday last week. Today she was at pt home- her car was there but pt did not answer door and did not answer phone call.     HH RN is concerned- she was going to reach out to [facility]

## 2020-12-17 NOTE — TELEPHONE ENCOUNTER
[facility] [place] [place]  [facility] needs to know who pt uses for Delano Vasquez RN adivsed pt is using [facility]

## 2020-12-22 ENCOUNTER — TELEPHONE (OUTPATIENT)
Dept: FAMILY MEDICINE CLINIC | Facility: CLINIC | Age: 60
End: 2020-12-22

## 2020-12-29 ENCOUNTER — TELEPHONE (OUTPATIENT)
Dept: FAMILY MEDICINE CLINIC | Facility: CLINIC | Age: 60
End: 2020-12-29

## 2020-12-29 RX ORDER — LANCETS
EACH MISCELLANEOUS
Qty: 102 EACH | OUTPATIENT
Start: 2020-12-29

## 2020-12-29 NOTE — TELEPHONE ENCOUNTER
Dominik Pineda from [facility] home health called to get Resumption of care orders. Pt was in [facility], She got home on 12-25-20 and they first visit was on the 12-26-20  Please return call to [phone], OK ot leave detailed message.

## 2020-12-29 NOTE — TELEPHONE ENCOUNTER
Per verbal with DS- okay to resume care for pt    RN spoke with [facility] RN Lars Orozco and gave verbal okay

## 2020-12-29 NOTE — TELEPHONE ENCOUNTER
Diabetic Medication Protocol [id number]/28/2020 06:28 PM   HgBA1C procedure resulted in past 6 months Protocol Details    Last HgBA1C < 7.5     Microalbumin procedure in past 12 months or taking ACE/ARB     Appointment in past 6 or next 3 months        [facility]   Last Refill: 11/23/20 #30 0 Rf  No future appointments. Last A1c value was  % done  .

## 2021-01-04 ENCOUNTER — TELEPHONE (OUTPATIENT)
Dept: FAMILY MEDICINE CLINIC | Facility: CLINIC | Age: 61
End: 2021-01-04

## 2021-01-04 NOTE — TELEPHONE ENCOUNTER
Courtney Rodriguez from [facility] [address] called, Wanted to leave a message for Dr. Lianne Ambriz to follow up with member, maybe at next appt member has not had a colorectal screening in the last year nor has pt had a wellness visit in the last year, nor a mammogram.  Just wanted to point

## 2021-01-06 ENCOUNTER — TELEPHONE (OUTPATIENT)
Dept: FAMILY MEDICINE CLINIC | Facility: CLINIC | Age: 61
End: 2021-01-06

## 2021-01-14 NOTE — TELEPHONE ENCOUNTER
Referral for intensive therapy. She is having  Issues with infection. She feels she needs to return to therapy. Please advise.

## 2021-01-14 NOTE — TELEPHONE ENCOUNTER
Pt states she needs a therapist to help talk to through the issues she is having- pt is looking for someone close that she can form a bond with and someone who she can rely on    Pt states she is still havign anxiety about all the issues that put her in al

## 2021-01-14 NOTE — TELEPHONE ENCOUNTER
BLADDER AND KIDNEY INFECTION AND TURNED SEPTIC. WENT AFIB AND NOW HAS HEART ISSUES. PT IS BY HERSELF AND JUST NEEDS SOMEONE THAT IS GOING TO GO OVER THE TOP FOR HER.     PT WANTS TO LET  KNOW THAT SHE IS DOING BETTER AND NO HALLUCINATIONS SINCE SHE H

## 2021-01-19 ENCOUNTER — TELEPHONE (OUTPATIENT)
Dept: FAMILY MEDICINE CLINIC | Facility: CLINIC | Age: 61
End: 2021-01-19

## 2021-01-19 NOTE — TELEPHONE ENCOUNTER
Pt was advised of information below- verbalized understanding    Pt was also provided with phone number to [facility]

## 2021-01-19 NOTE — TELEPHONE ENCOUNTER
OK lets start with not drinking more than a gallon of water a day. And stop drinking at like 7 pm. In the big scheme of things, the hydrochlorothiazide is pretty mild.  Also lets watch our salt intake, added salt , salty processed foods make you retina wate

## 2021-01-19 NOTE — TELEPHONE ENCOUNTER
PT called- advised nurse that she is having swollen feel and ankles. She states that she is taking a stronger diuretic and she is diuresing a lot.   Pt states her \"ankles were her best feature- not today\"  Pt states she is taking hydrochlorothiazide 25mg

## 2021-01-21 ENCOUNTER — TELEPHONE (OUTPATIENT)
Dept: FAMILY MEDICINE CLINIC | Facility: CLINIC | Age: 61
End: 2021-01-21

## 2021-02-22 RX ORDER — BLOOD SUGAR DIAGNOSTIC
STRIP MISCELLANEOUS
Qty: 100 STRIP | Refills: 1 | Status: SHIPPED | OUTPATIENT
Start: 2021-02-22 | End: 2021-07-06

## 2021-03-08 ENCOUNTER — MED REC SCAN ONLY (OUTPATIENT)
Dept: FAMILY MEDICINE CLINIC | Facility: CLINIC | Age: 61
End: 2021-03-08

## 2021-03-20 ENCOUNTER — TELEPHONE (OUTPATIENT)
Dept: FAMILY MEDICINE CLINIC | Facility: CLINIC | Age: 61
End: 2021-03-20

## 2021-03-20 NOTE — TELEPHONE ENCOUNTER
Pt believes she is getting another bacterial infection. She would like to speak with a nurse. Please call back.

## 2021-03-20 NOTE — TELEPHONE ENCOUNTER
RN spoke with pt- she states she has been doing good    Pt states she started getting chills- this is a sign that she is getting an infection    Every day it has been another sxs- and it is making her more anxious.     Pt thinks this is a kidney/bladder inf

## 2021-03-24 ENCOUNTER — TELEPHONE (OUTPATIENT)
Dept: FAMILY MEDICINE CLINIC | Facility: CLINIC | Age: 61
End: 2021-03-24

## 2021-03-24 NOTE — PROGRESS NOTES
Yuli Lee is a 63year old female. HPI:   Karlene Allred presents today for follow up after a prolonged period of time and had  Been hospitalized for an extended period of time.  She finally saw Dr. Stella Moore last week and changed her meds around and stopped h tablet (200 mg total) by mouth daily. 0   • metoprolol Tartrate 25 MG Oral Tab Take 1 tablet (25 mg total) by mouth 2 (two) times daily. 0   • Venlafaxine HCl  MG Oral Capsule SR 24 Hr Take 2 capsules (300 mg total) by mouth daily.   2   • hydrochlo (Temporal)   Resp 24   Wt 290 lb 6.4 oz (131.7 kg)   BMI 48.33 kg/m²   GENERAL: well developed, well nourished,in no apparent distress  SKIN: no rashes,no suspicious lesions  HEENT: atraumatic, normocephalic,ears and throat are clear  NECK: supple,no adeno

## 2021-03-24 NOTE — TELEPHONE ENCOUNTER
----- Message from Shiv Rosas DO sent at 3/24/2021 12:25 PM CDT -----  Can notify her urine was negative for UTI,

## 2021-04-07 ENCOUNTER — TELEPHONE (OUTPATIENT)
Dept: FAMILY MEDICINE CLINIC | Facility: CLINIC | Age: 61
End: 2021-04-07

## 2021-04-07 NOTE — TELEPHONE ENCOUNTER
Pt called she was on [facility] site to schedule a covid vaccine. They have an opening for 1030 this morning. Pt has some questions that she would like to ask the nurse.  Please advise

## 2021-04-07 NOTE — TELEPHONE ENCOUNTER
RN spoke with pt- and she wanted to know if it was okay to get her vaccine d/t her heart? RN advised that d/t pt heart she should get the vaccine to keep her protected    [facility] and [facility] if [facility].     PT verbalize

## 2021-04-15 ENCOUNTER — HOSPITAL ENCOUNTER (OUTPATIENT)
Age: 61
Discharge: OTHER INSTITUTION | End: 2021-04-15
Payer: COMMERCIAL

## 2021-04-15 ENCOUNTER — TELEPHONE (OUTPATIENT)
Dept: FAMILY MEDICINE CLINIC | Facility: CLINIC | Age: 61
End: 2021-04-15

## 2021-04-15 VITALS
OXYGEN SATURATION: 98 % | TEMPERATURE: 97 F | HEART RATE: 128 BPM | RESPIRATION RATE: 18 BRPM | SYSTOLIC BLOOD PRESSURE: 97 MMHG | DIASTOLIC BLOOD PRESSURE: 53 MMHG

## 2021-04-15 DIAGNOSIS — R00.0 TACHYCARDIA: Primary | ICD-10-CM

## 2021-04-15 DIAGNOSIS — R06.00 DYSPNEA, UNSPECIFIED TYPE: ICD-10-CM

## 2021-04-15 DIAGNOSIS — I48.91 ATRIAL FIBRILLATION WITH RAPID VENTRICULAR RESPONSE (HCC): ICD-10-CM

## 2021-04-15 DIAGNOSIS — R60.9 2+ PITTING EDEMA: ICD-10-CM

## 2021-04-15 DIAGNOSIS — I95.9 HYPOTENSIVE EPISODE: ICD-10-CM

## 2021-04-15 PROCEDURE — 93000 ELECTROCARDIOGRAM COMPLETE: CPT | Performed by: NURSE PRACTITIONER

## 2021-04-15 PROCEDURE — 99214 OFFICE O/P EST MOD 30 MIN: CPT | Performed by: NURSE PRACTITIONER

## 2021-04-15 RX ORDER — RIVAROXABAN 20 MG/1
20 TABLET, FILM COATED ORAL
COMMUNITY
Start: 2021-03-31

## 2021-04-15 NOTE — ED INITIAL ASSESSMENT (HPI)
Patient states she has been having bilateral lower leg swelling for 2 days, sob. Patient has hx of a-fib. Pt recently put on xarelto.

## 2021-04-15 NOTE — TELEPHONE ENCOUNTER
Pt was advised of provider recommendation- pt states she is concerned about what she should take for pain    RN states it is important that we find out what the root cause of her issue is and then that will let us know what is appropriate to take for her S

## 2021-04-15 NOTE — TELEPHONE ENCOUNTER
Pt called, thinks she is getting another UTI, can we call in something for her? [facility].    Please call pt at [phone]

## 2021-04-15 NOTE — ED PROVIDER NOTES
Patient Seen in: [facility]      History   Patient presents with:  Shortness Of Breath  Swelling Edema  Tachycardia    Stated Complaint: Kidney Issues    HPI/Subjective:   27-year-old female who presents to the IC with complaints of bilateral shoulder arthroplasty.  L knee exploratory surgery, B foot surgery, uterine d and c   • TONSILLECTOMY                  Social History    Tobacco Use      Smoking status: Former Smoker        Start date: 5/10/2013      Smokeless tobacco: Former User        Q Capillary Refill: Capillary refill takes less than 2 seconds. Neurological:      General: No focal deficit present. Mental Status: She is alert and oriented to person, place, and time. Psychiatric:         Mood and Affect: Mood is anxious.

## 2021-04-15 NOTE — ED NOTES
I reviewed that chart and discussed the case.   I have examined the patient and noted patient is a 27-year-old female presents immediate care with history of bilateral leg swelling shortness of breath for 2 days has a history of atrial fibrillation presents

## 2021-04-26 ENCOUNTER — TELEPHONE (OUTPATIENT)
Dept: FAMILY MEDICINE CLINIC | Facility: CLINIC | Age: 61
End: 2021-04-26

## 2021-04-26 NOTE — TELEPHONE ENCOUNTER
Routing to provider- please see notes in James- pt was in in [facility] on 4/22/21

## 2021-04-26 NOTE — TELEPHONE ENCOUNTER
RN spoke with Neisha Linn and gave verbal order to start care    Will send notes to Dr. Marvin Ackerman to sign

## 2021-04-29 ENCOUNTER — OFFICE VISIT (OUTPATIENT)
Dept: FAMILY MEDICINE CLINIC | Facility: CLINIC | Age: 61
End: 2021-04-29
Payer: COMMERCIAL

## 2021-04-29 VITALS
SYSTOLIC BLOOD PRESSURE: 116 MMHG | HEART RATE: 92 BPM | RESPIRATION RATE: 20 BRPM | TEMPERATURE: 98 F | DIASTOLIC BLOOD PRESSURE: 76 MMHG | WEIGHT: 283.81 LBS | BODY MASS INDEX: 47 KG/M2

## 2021-04-29 DIAGNOSIS — I48.0 PAROXYSMAL ATRIAL FIBRILLATION (HCC): ICD-10-CM

## 2021-04-29 DIAGNOSIS — R94.39 ABNORMAL NUCLEAR STRESS TEST: ICD-10-CM

## 2021-04-29 DIAGNOSIS — I25.119 CORONARY ARTERY DISEASE INVOLVING NATIVE CORONARY ARTERY OF NATIVE HEART WITH ANGINA PECTORIS (HCC): ICD-10-CM

## 2021-04-29 DIAGNOSIS — I10 ESSENTIAL HYPERTENSION: ICD-10-CM

## 2021-04-29 DIAGNOSIS — Z98.890 S/P ABLATION OF ATRIAL FIBRILLATION: Primary | ICD-10-CM

## 2021-04-29 DIAGNOSIS — I50.33 ACUTE ON CHRONIC DIASTOLIC CONGESTIVE HEART FAILURE (HCC): ICD-10-CM

## 2021-04-29 DIAGNOSIS — Z86.79 S/P ABLATION OF ATRIAL FIBRILLATION: Primary | ICD-10-CM

## 2021-04-29 DIAGNOSIS — E05.90 SUBCLINICAL HYPERTHYROIDISM: ICD-10-CM

## 2021-04-29 PROBLEM — E53.8 B12 DEFICIENCY: Status: ACTIVE | Noted: 2021-04-21

## 2021-04-29 PROBLEM — I50.30 DIASTOLIC CONGESTIVE HEART FAILURE (HCC): Status: ACTIVE | Noted: 2021-04-29

## 2021-04-29 PROBLEM — R94.31 PROLONGED Q-T INTERVAL ON ECG: Status: ACTIVE | Noted: 2021-04-16

## 2021-04-29 PROCEDURE — 3078F DIAST BP <80 MM HG: CPT | Performed by: FAMILY MEDICINE

## 2021-04-29 PROCEDURE — 3074F SYST BP LT 130 MM HG: CPT | Performed by: FAMILY MEDICINE

## 2021-04-29 PROCEDURE — 99214 OFFICE O/P EST MOD 30 MIN: CPT | Performed by: FAMILY MEDICINE

## 2021-04-29 RX ORDER — GABAPENTIN 100 MG/1
200 CAPSULE ORAL 3 TIMES DAILY
COMMUNITY
Start: 2021-04-23 | End: 2021-07-06

## 2021-04-29 RX ORDER — FOLIC ACID-PYRIDOXINE-CYANOCOBALAMIN TAB 2.5-25-2 MG 2.5-25-2 MG
1 TAB ORAL DAILY
COMMUNITY
Start: 2021-04-23 | End: 2021-07-06

## 2021-04-29 RX ORDER — GABAPENTIN 100 MG/1
200 CAPSULE ORAL 3 TIMES DAILY
COMMUNITY
Start: 2021-04-23 | End: 2021-06-10

## 2021-04-29 RX ORDER — METHIMAZOLE 10 MG/1
10 TABLET ORAL DAILY
COMMUNITY
Start: 2021-04-23 | End: 2021-07-06

## 2021-04-29 NOTE — PROGRESS NOTES
Josee Nunez is a 63year old female. HPI:   Denies presents today for follow up on recent hospitalization for atrial fibrillation with acute on chronic heart failure. She has a HX of atrial fibrillation s/p ablation.  She had thrush and she thinks a U Inhalation Aero Soln Inhale 2 puffs into the lungs every 6 (six) hours as needed for Wheezing.  1 Inhaler 2   • clotrimazole-betamethasone 1-0.05 % External Cream Apply to the affected areas bid for 10-14 days 60 g 3   • ALPRAZolam 1 MG Oral Tab   4      Pa atrial fibrillation (hcc)  Abnormal nuclear stress test  Coronary artery disease involving native coronary artery of native heart with angina pectoris (hcc)  Acute on chronic diastolic congestive heart failure (hcc)  Essential hypertension  Subclinical hyp

## 2021-05-06 ENCOUNTER — TELEPHONE (OUTPATIENT)
Dept: FAMILY MEDICINE CLINIC | Facility: CLINIC | Age: 61
End: 2021-05-06

## 2021-05-13 DIAGNOSIS — M25.512 CHRONIC LEFT SHOULDER PAIN: ICD-10-CM

## 2021-05-13 DIAGNOSIS — M19.112 OSTEOARTHRITIS OF LEFT SHOULDER DUE TO ROTATOR CUFF INJURY: Primary | ICD-10-CM

## 2021-05-13 DIAGNOSIS — S46.002S OSTEOARTHRITIS OF LEFT SHOULDER DUE TO ROTATOR CUFF INJURY: Primary | ICD-10-CM

## 2021-05-13 DIAGNOSIS — G89.29 CHRONIC LEFT SHOULDER PAIN: ICD-10-CM

## 2021-05-13 RX ORDER — OXCARBAZEPINE 150 MG/1
150 TABLET, FILM COATED ORAL 2 TIMES DAILY
COMMUNITY
Start: 2021-05-02 | End: 2021-07-06

## 2021-05-13 RX ORDER — ACETAMINOPHEN AND CODEINE PHOSPHATE 300; 30 MG/1; MG/1
1 TABLET ORAL EVERY 4 HOURS PRN
Qty: 20 TABLET | Refills: 0 | Status: SHIPPED | OUTPATIENT
Start: 2021-05-13 | End: 2021-05-13

## 2021-05-13 RX ORDER — ACETAMINOPHEN AND CODEINE PHOSPHATE 300; 30 MG/1; MG/1
1 TABLET ORAL EVERY 4 HOURS PRN
Qty: 30 TABLET | Refills: 0 | Status: SHIPPED | OUTPATIENT
Start: 2021-05-13 | End: 2021-07-06

## 2021-05-13 RX ORDER — METOPROLOL SUCCINATE 25 MG/1
TABLET, EXTENDED RELEASE ORAL
COMMUNITY
Start: 2021-05-06 | End: 2021-07-06

## 2021-05-13 NOTE — TELEPHONE ENCOUNTER
Per verbal conversation with Dr. Mary Jo Su lets try to the Tylenol #3 with codeine to see if that doesn't help and     Pt was advised- she states she has tolerated it in the past    Please send to [phone] RN will call when MRI has been approved

## 2021-05-13 NOTE — TELEPHONE ENCOUNTER
Pt states shoulder pain has been going on for a while. Pt states she is having trouble sleeping. Shoulder pain is reaching through her neck and up to her head- down the shoulder and through the elbow.   From the elbow down to the finger tips is numbness

## 2021-05-13 NOTE — TELEPHONE ENCOUNTER
PT CALLED AND ADV SHE HASNT BEEN ABLE TO SLEEP. LOTS OF SHOULDER PAIN AND IT TRAVELS AROUND THE NECK. PT ALSO ADV THAT PAIN TRAVELS DOWN ARM, ELBOW, AND DOWN INTO FINGERS - FINGERS ARE NUMB    PT ADV HAS HEAD PAIN BUT NOT HEADACHE.     LOOKING TO SEE [facility]

## 2021-05-18 ENCOUNTER — TELEPHONE (OUTPATIENT)
Dept: FAMILY MEDICINE CLINIC | Facility: CLINIC | Age: 61
End: 2021-05-18

## 2021-05-22 ENCOUNTER — TELEPHONE (OUTPATIENT)
Dept: FAMILY MEDICINE CLINIC | Facility: CLINIC | Age: 61
End: 2021-05-22

## 2021-05-22 RX ORDER — AMOXICILLIN 500 MG/1
CAPSULE ORAL
Qty: 4 CAPSULE | Refills: 0 | Status: SHIPPED | OUTPATIENT
Start: 2021-05-22 | End: 2021-06-10 | Stop reason: ALTCHOICE

## 2021-05-22 NOTE — TELEPHONE ENCOUNTER
RN spoke with Annette at pharmacy and confirmed that the pt should be prescribed 6 pills total    Verbalized understanding

## 2021-05-22 NOTE — TELEPHONE ENCOUNTER
RN spoke with pt- she is having some tooth issue- bottom right last molar. Pt is being seen at dentist on Monday at [address] [facility] [address]    The dentist told her she needs to be on ABX? Pt states with her shoulder replacement she needs ABX prior to visit.        Pt als

## 2021-05-22 NOTE — TELEPHONE ENCOUNTER
Rita from [place] called, needs to verify quantity for Amoxicillin.    Please call Rita at [phone]

## 2021-05-22 NOTE — TELEPHONE ENCOUNTER
Pt called, has a dentist appt this Monday and needs a strong antibiotic to take before going there. Has a bad molar issue going on.    Please call pt at [phone]

## 2021-05-25 ENCOUNTER — TELEPHONE (OUTPATIENT)
Dept: FAMILY MEDICINE CLINIC | Facility: CLINIC | Age: 61
End: 2021-05-25

## 2021-05-25 ENCOUNTER — MED REC SCAN ONLY (OUTPATIENT)
Dept: FAMILY MEDICINE CLINIC | Facility: CLINIC | Age: 61
End: 2021-05-25

## 2021-05-25 NOTE — TELEPHONE ENCOUNTER
LM for pt ( chalino per HIPPA consent)     Shoulder MRI has been approved through 6/15/21    Pt needs to call CS to schedule [phone]

## 2021-06-04 ENCOUNTER — TELEPHONE (OUTPATIENT)
Dept: FAMILY MEDICINE CLINIC | Facility: CLINIC | Age: 61
End: 2021-06-04

## 2021-06-04 NOTE — TELEPHONE ENCOUNTER
Pt has been in and out of the hospital for surgery. She stated she is in A-fib, She hasn't felt right for about 24 hours. She is overly stressed. She would like to know if she can come in for an EKG. She doesn't want to go to the ER again.      Please r

## 2021-06-04 NOTE — TELEPHONE ENCOUNTER
Patient states she has history of Afib. In the last 24 hours she hasn't felt right. Patient states since Tuesday her SOB has increased. No dizziness and no chest pain. Has shoulder pain due to needing shoulder replacement.  States she is not sleeping

## 2021-06-10 ENCOUNTER — OFFICE VISIT (OUTPATIENT)
Dept: FAMILY MEDICINE CLINIC | Facility: CLINIC | Age: 61
End: 2021-06-10
Payer: COMMERCIAL

## 2021-06-10 ENCOUNTER — TELEPHONE (OUTPATIENT)
Dept: FAMILY MEDICINE CLINIC | Facility: CLINIC | Age: 61
End: 2021-06-10

## 2021-06-10 VITALS
WEIGHT: 289.81 LBS | HEART RATE: 76 BPM | DIASTOLIC BLOOD PRESSURE: 62 MMHG | BODY MASS INDEX: 48 KG/M2 | TEMPERATURE: 98 F | SYSTOLIC BLOOD PRESSURE: 116 MMHG | RESPIRATION RATE: 20 BRPM

## 2021-06-10 DIAGNOSIS — I35.1 AORTIC EJECTION MURMUR: ICD-10-CM

## 2021-06-10 DIAGNOSIS — I10 ESSENTIAL HYPERTENSION: ICD-10-CM

## 2021-06-10 DIAGNOSIS — I25.119 CORONARY ARTERY DISEASE INVOLVING NATIVE CORONARY ARTERY OF NATIVE HEART WITH ANGINA PECTORIS (HCC): ICD-10-CM

## 2021-06-10 DIAGNOSIS — R94.39 ABNORMAL NUCLEAR STRESS TEST: ICD-10-CM

## 2021-06-10 DIAGNOSIS — I50.33 ACUTE ON CHRONIC DIASTOLIC CONGESTIVE HEART FAILURE (HCC): ICD-10-CM

## 2021-06-10 DIAGNOSIS — I48.0 PAROXYSMAL ATRIAL FIBRILLATION (HCC): Primary | ICD-10-CM

## 2021-06-10 PROCEDURE — 3078F DIAST BP <80 MM HG: CPT | Performed by: FAMILY MEDICINE

## 2021-06-10 PROCEDURE — 99214 OFFICE O/P EST MOD 30 MIN: CPT | Performed by: FAMILY MEDICINE

## 2021-06-10 PROCEDURE — 3074F SYST BP LT 130 MM HG: CPT | Performed by: FAMILY MEDICINE

## 2021-06-10 RX ORDER — VENLAFAXINE HYDROCHLORIDE 150 MG/1
1 CAPSULE, EXTENDED RELEASE ORAL 2 TIMES DAILY
COMMUNITY
Start: 2021-06-04 | End: 2021-07-06

## 2021-06-10 NOTE — PROGRESS NOTES
Berto Chin is a 63year old female. HPI:   Inessa Jean is here for follow up after she saw cardiology and EP after her hospitalization, she saw them and they doubled up her diuretic, and she was doing better. But then her swelling started to come back. Reported on 6/10/2021 ) 30 tablet 5   • Accu-Chek FastClix Lancets Does not apply Misc USE BID UTD.      • albuterol sulfate (2.5 MG/3ML) 0.083% Inhalation Nebu Soln Take 3 mL (2.5 mg total) by nebulization 4 (four) times daily as needed for Wheezing or Ellen post  GI: good BS's,no masses, HSM or tenderness  EXTREMITIES: no cyanosis, clubbing , has visible of both LE edema    ASSESSMENT AND PLAN:     Paroxysmal atrial fibrillation (hcc)  (primary encounter diagnosis)  Acute on chronic diastolic congestive heart

## 2021-06-17 ENCOUNTER — TELEPHONE (OUTPATIENT)
Dept: FAMILY MEDICINE CLINIC | Facility: CLINIC | Age: 61
End: 2021-06-17

## 2021-06-22 ENCOUNTER — TELEPHONE (OUTPATIENT)
Dept: FAMILY MEDICINE CLINIC | Facility: CLINIC | Age: 61
End: 2021-06-22

## 2021-07-06 ENCOUNTER — TELEPHONE (OUTPATIENT)
Dept: FAMILY MEDICINE CLINIC | Facility: CLINIC | Age: 61
End: 2021-07-06

## 2021-07-06 RX ORDER — GABAPENTIN 100 MG/1
200 CAPSULE ORAL 3 TIMES DAILY
Qty: 180 CAPSULE | Refills: 2 | Status: SHIPPED | OUTPATIENT
Start: 2021-07-06 | End: 2021-11-15

## 2021-07-06 RX ORDER — VENLAFAXINE HYDROCHLORIDE 150 MG/1
150 CAPSULE, EXTENDED RELEASE ORAL 2 TIMES DAILY
Qty: 60 CAPSULE | Refills: 0 | Status: SHIPPED | OUTPATIENT
Start: 2021-07-06

## 2021-07-06 RX ORDER — OXCARBAZEPINE 150 MG/1
150 TABLET, FILM COATED ORAL 2 TIMES DAILY
Qty: 60 TABLET | Refills: 0 | Status: SHIPPED | OUTPATIENT
Start: 2021-07-06 | End: 2021-07-10

## 2021-07-06 RX ORDER — RIVAROXABAN 20 MG/1
20 TABLET, FILM COATED ORAL
Refills: 0 | Status: CANCELLED | OUTPATIENT
Start: 2021-07-06

## 2021-07-06 RX ORDER — QUETIAPINE 50 MG/1
150 TABLET, FILM COATED ORAL NIGHTLY
Qty: 30 TABLET | Refills: 0 | Status: SHIPPED | OUTPATIENT
Start: 2021-07-06

## 2021-07-06 RX ORDER — BLOOD SUGAR DIAGNOSTIC
STRIP MISCELLANEOUS
Qty: 100 STRIP | Refills: 1 | Status: SHIPPED | OUTPATIENT
Start: 2021-07-06

## 2021-07-06 RX ORDER — ATORVASTATIN CALCIUM 20 MG/1
20 TABLET, FILM COATED ORAL NIGHTLY
Qty: 30 TABLET | Refills: 5 | Status: SHIPPED | OUTPATIENT
Start: 2021-07-06

## 2021-07-06 RX ORDER — METOPROLOL SUCCINATE 25 MG/1
TABLET, EXTENDED RELEASE ORAL
Qty: 60 TABLET | Refills: 0 | Status: SHIPPED | OUTPATIENT
Start: 2021-07-06 | End: 2021-07-13

## 2021-07-06 RX ORDER — AMIODARONE HYDROCHLORIDE 200 MG/1
200 TABLET ORAL DAILY
Qty: 30 TABLET | Refills: 0 | Status: SHIPPED | OUTPATIENT
Start: 2021-07-06

## 2021-07-06 RX ORDER — BUMETANIDE 1 MG/1
1 TABLET ORAL DAILY
Qty: 30 TABLET | Refills: 1 | Status: SHIPPED | OUTPATIENT
Start: 2021-07-06 | End: 2021-08-05

## 2021-07-06 RX ORDER — METHIMAZOLE 10 MG/1
10 TABLET ORAL DAILY
Qty: 30 TABLET | Refills: 0 | Status: SHIPPED | OUTPATIENT
Start: 2021-07-06

## 2021-07-06 RX ORDER — FOLIC ACID-PYRIDOXINE-CYANOCOBALAMIN TAB 2.5-25-2 MG 2.5-25-2 MG
1 TAB ORAL DAILY
Qty: 30 TABLET | Refills: 0 | Status: SHIPPED | OUTPATIENT
Start: 2021-07-06

## 2021-07-06 RX ORDER — ACETAMINOPHEN AND CODEINE PHOSPHATE 300; 30 MG/1; MG/1
1 TABLET ORAL EVERY 4 HOURS PRN
Qty: 30 TABLET | Refills: 0 | Status: SHIPPED | OUTPATIENT
Start: 2021-07-06 | End: 2021-11-24

## 2021-07-06 NOTE — TELEPHONE ENCOUNTER
PT CALLED AND ADV THAT SHE HAD ABRUPT MOVE THIS WEEKEND AND ADV THAT THEY TOOK ALL HER MEDICATION. IT IS ALL LOCKED UP AND NOT ABLE TO GET ANY OF IT. PT WONDERING IF SHE CAN GET SHORT DOSE OF ALL MEDS.     PLEASE ADV      THANK YOU

## 2021-07-06 NOTE — TELEPHONE ENCOUNTER
Pt is very overwhelmed right now and all over her belonging are currently in a storage unit- pt states they packed up all her medicine and it is currently stored in the middle of her storage unit.     Pt has no medication on hand at all- can we fill 30 day

## 2021-07-07 RX ORDER — METHIMAZOLE 10 MG/1
TABLET ORAL
Qty: 90 TABLET | Refills: 0 | OUTPATIENT
Start: 2021-07-07

## 2021-07-07 RX ORDER — AMIODARONE HYDROCHLORIDE 200 MG/1
TABLET ORAL
Qty: 90 TABLET | Refills: 0 | OUTPATIENT
Start: 2021-07-07

## 2021-07-08 RX ORDER — BUMETANIDE 1 MG/1
TABLET ORAL
Qty: 90 TABLET | Refills: 0 | OUTPATIENT
Start: 2021-07-08

## 2021-07-10 RX ORDER — OXCARBAZEPINE 150 MG/1
150 TABLET, FILM COATED ORAL 2 TIMES DAILY
Qty: 180 TABLET | Refills: 2 | Status: SHIPPED | OUTPATIENT
Start: 2021-07-10 | End: 2023-08-02

## 2021-07-12 ENCOUNTER — OFFICE VISIT (OUTPATIENT)
Dept: FAMILY MEDICINE CLINIC | Facility: CLINIC | Age: 61
End: 2021-07-12
Payer: COMMERCIAL

## 2021-07-12 VITALS
RESPIRATION RATE: 20 BRPM | TEMPERATURE: 98 F | BODY MASS INDEX: 47 KG/M2 | DIASTOLIC BLOOD PRESSURE: 80 MMHG | HEART RATE: 92 BPM | WEIGHT: 283.63 LBS | SYSTOLIC BLOOD PRESSURE: 148 MMHG

## 2021-07-12 DIAGNOSIS — I48.0 PAROXYSMAL ATRIAL FIBRILLATION (HCC): ICD-10-CM

## 2021-07-12 DIAGNOSIS — I35.1 AORTIC EJECTION MURMUR: ICD-10-CM

## 2021-07-12 DIAGNOSIS — I50.31 ACUTE DIASTOLIC CONGESTIVE HEART FAILURE (HCC): Primary | ICD-10-CM

## 2021-07-12 DIAGNOSIS — R60.0 EDEMA, LOWER EXTREMITY: ICD-10-CM

## 2021-07-12 PROCEDURE — 99214 OFFICE O/P EST MOD 30 MIN: CPT | Performed by: FAMILY MEDICINE

## 2021-07-12 PROCEDURE — 3079F DIAST BP 80-89 MM HG: CPT | Performed by: FAMILY MEDICINE

## 2021-07-12 PROCEDURE — 3077F SYST BP >= 140 MM HG: CPT | Performed by: FAMILY MEDICINE

## 2021-07-12 RX ORDER — LAMOTRIGINE 100 MG/1
100 TABLET ORAL 3 TIMES DAILY
COMMUNITY
Start: 2021-06-17

## 2021-07-12 NOTE — PROGRESS NOTES
Raymundo Mi is a 63year old female. HPI:   Kash Wright is here for follow up after she saw cardiology and EP after her hospitalization, she saw them and they doubled up her diuretic, and she was doing better. But then her swelling started to come back. mouth daily with dinner. • Accu-Chek FastClix Lancets Does not apply Misc USE BID UTD.      • albuterol sulfate (2.5 MG/3ML) 0.083% Inhalation Nebu Soln Take 3 mL (2.5 mg total) by nebulization 4 (four) times daily as needed for Wheezing or Shortness of lesions  HEENT: atraumatic, normocephalic,ears and throat are clear  NECK: supple,no adenopathy,no bruits  LUNGS: clear to auscultation  CARDIO: RRR with 3/6 HENRRY over the aortic post  GI: good BS's,no masses, HSM or tenderness  EXTREMITIES: no cyanosis, cl

## 2021-07-13 RX ORDER — METOPROLOL SUCCINATE 25 MG/1
TABLET, EXTENDED RELEASE ORAL
Qty: 180 TABLET | Refills: 0 | Status: SHIPPED | OUTPATIENT
Start: 2021-07-13

## 2021-07-13 NOTE — TELEPHONE ENCOUNTER
Hypertension Medications Protocol [id number]/13/2021 11:12 AM   CMP or BMP in past 12 months Protocol Details    Last serum creatinine< 2.0     Appointment in past 6 or next 3 months      LOV: yesterday   Last Refill: 7/6/21 #60 0 RF    States pt is looking

## 2021-07-15 ENCOUNTER — TELEPHONE (OUTPATIENT)
Dept: FAMILY MEDICINE CLINIC | Facility: CLINIC | Age: 61
End: 2021-07-15

## 2021-07-15 NOTE — TELEPHONE ENCOUNTER
PT CALLED AND ADV THAT SHE IS SITTING AT THE [facility] TO GET PARKING PLACARD. PT ADV  FILLED OUT PAPERWORK THE OTHER DAY AND SHE HAS LOST IT.  WOULD LIKE TO HAVE  FILL OUT ASAP- DUE TO NOBODY IN LINE.     ADV PT  IS WITH PTS AND WE WILL CALL WHEN FORM I

## 2021-07-23 ENCOUNTER — TELEPHONE (OUTPATIENT)
Dept: FAMILY MEDICINE CLINIC | Facility: CLINIC | Age: 61
End: 2021-07-23

## 2021-07-23 RX ORDER — CIPROFLOXACIN 500 MG/1
500 TABLET, FILM COATED ORAL 2 TIMES DAILY
Qty: 14 TABLET | Refills: 0 | Status: SHIPPED | OUTPATIENT
Start: 2021-07-23 | End: 2021-07-30

## 2021-07-23 NOTE — TELEPHONE ENCOUNTER
Pt reports she had same incident exactly year ago this week. Pt reports kidney infection where she had to go to UC and then to ED for sepsis, admitted for acute cystitis (07/22/2020)    Pt denies back pain, denies fevers.   Pt c/o not steady, not strong fl

## 2021-07-23 NOTE — TELEPHONE ENCOUNTER
Please let patient or caregiver know or leave message that:   Cipro 500 mg twice per day for 7 days has been sent to [facility] at [address]. Have her f/u with Dr. Rut Mendoza next week if not getting better.   Thanks

## 2021-07-23 NOTE — TELEPHONE ENCOUNTER
Pt called she thinks that she might have a UTI or kidney infections. She said that the the flow of urine is slower than normal, and it has an odor, and feels exhausted. . She is prone to kidney infections.

## 2021-07-23 NOTE — TELEPHONE ENCOUNTER
Attempted to call pt regarding note below  Voice mailbox full and unable to leave message. Pt called back - Patient advised of Doctor's note below. Patient verbalized understanding. No further questions at this time.

## 2021-08-12 ENCOUNTER — OFFICE VISIT (OUTPATIENT)
Dept: FAMILY MEDICINE CLINIC | Facility: CLINIC | Age: 61
End: 2021-08-12
Payer: COMMERCIAL

## 2021-08-12 VITALS
SYSTOLIC BLOOD PRESSURE: 128 MMHG | HEART RATE: 76 BPM | WEIGHT: 271.38 LBS | DIASTOLIC BLOOD PRESSURE: 70 MMHG | RESPIRATION RATE: 16 BRPM | TEMPERATURE: 98 F | BODY MASS INDEX: 45 KG/M2

## 2021-08-12 DIAGNOSIS — I48.0 PAROXYSMAL ATRIAL FIBRILLATION (HCC): ICD-10-CM

## 2021-08-12 DIAGNOSIS — I50.33 ACUTE ON CHRONIC DIASTOLIC CONGESTIVE HEART FAILURE (HCC): ICD-10-CM

## 2021-08-12 DIAGNOSIS — K59.1 FUNCTIONAL DIARRHEA: ICD-10-CM

## 2021-08-12 DIAGNOSIS — I50.31 ACUTE DIASTOLIC CONGESTIVE HEART FAILURE (HCC): Primary | ICD-10-CM

## 2021-08-12 PROCEDURE — 3074F SYST BP LT 130 MM HG: CPT | Performed by: FAMILY MEDICINE

## 2021-08-12 PROCEDURE — 3078F DIAST BP <80 MM HG: CPT | Performed by: FAMILY MEDICINE

## 2021-08-12 PROCEDURE — 99214 OFFICE O/P EST MOD 30 MIN: CPT | Performed by: FAMILY MEDICINE

## 2021-08-12 NOTE — PROGRESS NOTES
Errol Brand is a 63year old female. HPI:   Too Jacques is here for follow up after she saw cardiology and EP after her hospitalization, she saw them and they doubled up her diuretic, and she was doing better. But then her swelling started to come back. MG Oral Tab Take 1 tablet (20 mg total) by mouth nightly. 30 tablet 5   • Glucose Blood (ACCU-CHEK GUIDE) In Vitro Strip TEST TWICE DAILY. 100 strip 1   • XARELTO 20 MG Oral Tab Take 20 mg by mouth daily with dinner.      • Accu-Chek FastClix Lancets Does n Cuff Size: thigh)   Pulse 76   Temp 98.2 °F (36.8 °C) (Temporal)   Resp 16   Wt 271 lb 6.4 oz (123.1 kg)   BMI 45.16 kg/m²   GENERAL: well developed, well nourished,in no apparent distress  SKIN: no rashes,no suspicious lesions  HEENT: atraumatic, normocep

## 2021-09-02 ENCOUNTER — MED REC SCAN ONLY (OUTPATIENT)
Dept: FAMILY MEDICINE CLINIC | Facility: CLINIC | Age: 61
End: 2021-09-02

## 2021-09-18 ENCOUNTER — TELEPHONE (OUTPATIENT)
Dept: FAMILY MEDICINE CLINIC | Facility: CLINIC | Age: 61
End: 2021-09-18

## 2021-09-18 NOTE — TELEPHONE ENCOUNTER
Left message to voicemail (per verbal release form consent, confirmed with identifying message.) Patient advised to call office back [phone].

## 2021-09-18 NOTE — TELEPHONE ENCOUNTER
Pt call has been feeling very dizzy and would like to know if she should go to the emergency room but is scared of driving     Pt requested to see Kavin Ambriz pt was advised PERLA Triplett not in office     Pt can be reached at [phone]    Thank you

## 2021-09-18 NOTE — TELEPHONE ENCOUNTER
Pt reports she is \"not feeling right\"  Sees DS once a month  Just saw two diff cards two weeks ago, 4 days apart  Pt states she is \"Scared shitless\"  Started new medication recently    4 weeks ago with DS - reported feeling kind of dizzy  Has been happ

## 2021-09-20 ENCOUNTER — TELEPHONE (OUTPATIENT)
Dept: FAMILY MEDICINE CLINIC | Facility: CLINIC | Age: 61
End: 2021-09-20

## 2021-09-20 ENCOUNTER — OFFICE VISIT (OUTPATIENT)
Dept: FAMILY MEDICINE CLINIC | Facility: CLINIC | Age: 61
End: 2021-09-20
Payer: COMMERCIAL

## 2021-09-20 VITALS
WEIGHT: 283 LBS | HEART RATE: 76 BPM | BODY MASS INDEX: 47 KG/M2 | RESPIRATION RATE: 20 BRPM | OXYGEN SATURATION: 98 % | SYSTOLIC BLOOD PRESSURE: 120 MMHG | TEMPERATURE: 97 F | DIASTOLIC BLOOD PRESSURE: 74 MMHG

## 2021-09-20 DIAGNOSIS — I35.1 AORTIC EJECTION MURMUR: ICD-10-CM

## 2021-09-20 DIAGNOSIS — F41.9 ANXIETY: ICD-10-CM

## 2021-09-20 DIAGNOSIS — I10 ESSENTIAL HYPERTENSION: Primary | ICD-10-CM

## 2021-09-20 DIAGNOSIS — G89.29 CHRONIC LEFT SHOULDER PAIN: ICD-10-CM

## 2021-09-20 DIAGNOSIS — M25.512 CHRONIC LEFT SHOULDER PAIN: ICD-10-CM

## 2021-09-20 DIAGNOSIS — F31.12 BIPOLAR 1 DISORDER WITH MODERATE MANIA (HCC): ICD-10-CM

## 2021-09-20 PROBLEM — R06.00 DYSPNEA ON EXERTION: Status: ACTIVE | Noted: 2021-08-23

## 2021-09-20 PROBLEM — R06.09 DYSPNEA ON EXERTION: Status: ACTIVE | Noted: 2021-08-23

## 2021-09-20 PROCEDURE — 99215 OFFICE O/P EST HI 40 MIN: CPT | Performed by: FAMILY MEDICINE

## 2021-09-20 PROCEDURE — 3074F SYST BP LT 130 MM HG: CPT | Performed by: FAMILY MEDICINE

## 2021-09-20 PROCEDURE — 3078F DIAST BP <80 MM HG: CPT | Performed by: FAMILY MEDICINE

## 2021-09-20 NOTE — TELEPHONE ENCOUNTER
Pt wants to know when she should get the Covid 19 booster. Pt states she is in the high risk category for covid. She wants to know if Dr Miriam Rajan thinks its a good idea for her to get the booster.      Also, pt would like to know if she can have another [place]

## 2021-09-20 NOTE — TELEPHONE ENCOUNTER
General


Chief Complaint:  Respiratory Problems


Stated Complaint:  SOB/LIGHTHEADED


Source of Information:  Patient


Exam Limitations:  No Limitations





History of Present Illness


Date Seen by Provider:  Apr 15, 2020


Time Seen by Provider:  20:56


Initial Comments


to ER with some ongoing issues of lightheadedness shortness of breath. Fevers as

recent as 1 week ago. She was seen here on 4/4/20 for the same, diagnosed with 

UTI. Since then she's been having near syncopal episodes without antecedent 

palpitations or sensation of anxiety. These episodes seem to come about with 

activity. Also c/o some LUQ abdominal pain.


Timing/Duration:  1-2 Days


Severity:  Moderate


Associated Systoms:  No Headaches; Shortness of Air





Allergies and Home Medications


Allergies


Coded Allergies:  


     No Known Drug Allergies (Verified , 4/15/20)





Home Medications


Acetaminophen with Codeine 1 Each Tablet, 1-2 TAB PO Q6H PRN for Pain


   Prescribed by: MARCELO OLIVEIRA on 4/11/17 0827


Cephalexin 500 Mg Tablet, 500 MG PO BID


   Prescribed by: JEREMI MARTIN on 4/5/20 0101


Docusate Sodium 100 Mg Capsule, 100 MG PO BID PRN for CONSTIPATION-1ST LINE


   Prescribed by: MARCELO OLIVEIRA on 4/10/17 0944


Ferrous Sulfate 325 Mg Tablet, 325 MG PO DAILY


   Prescribed by: MARCELO OLIVEIRA on 4/10/17 0945


Ibuprofen 600 Mg Tablet, 600 MG PO Q6H


   Prescribed by: MARCELO OLIVEIRA on 4/10/17 0944


Omeprazole 20 Mg Tablet.dr, 20 MG PO DAILY, (Reported)


Prenatal Vit/Iron Fumarate/FA 1 Each Tablet, 1 EACH PO DAILY, (Reported)





Patient Home Medication List


Home Medication List Reviewed:  Yes





Review of Systems


Review of Systems


Constitutional:  see HPI; No chills, No fever


EENTM:  see HPI


Respiratory:  no symptoms reported


Cardiovascular:  see HPI; No chest pain; syncope (near-syncope)


Gastrointestinal:  abdominal pain


Genitourinary:  no symptoms reported


Musculoskeletal:  no symptoms reported


Skin:  no symptoms reported


Psychiatric/Neurological:  No Symptoms Reported





Physical Exam


Vital Signs





Vital Signs - First Documented








 4/15/20





 20:55


 


Temp 36.6


 


Pulse 100


 


Resp 20


 


B/P (MAP) 144/86 (105)


 


Pulse Ox 97


 


O2 Delivery Room Air





Capillary Refill :


Height, Weight, BMI


Height: 5'6.50"


Weight: 225lbs. 0.0oz. 102.545086hd; 29.00 BMI


Method:Stated


General Appearance:  No Apparent Distress, WD/WN


Eyes:  Bilateral Eye Normal Inspection, Bilateral Eye PERRL, Bilateral Eye EOMI


Neck:  Full Range of Motion, Normal Inspection


Respiratory:  No Accessory Muscle Use, No Respiratory Distress


Cardiovascular:  Regular Rate, Rhythm, Normal Peripheral Pulses


Gastrointestinal:  Normal Bowel Sounds, Soft, Tenderness (left upper)


Extremity:  Normal Capillary Refill, Normal Inspection


Neurologic/Psychiatric:  Alert, Oriented x3


Skin:  Normal Color, Warm/Dry





Progress/Results/Core Measures


Suspected Sepsis


SIRS


Temperature: 


Pulse:  


Respiratory Rate: 


 


Laboratory Tests


4/15/20 20:50: White Blood Count 10.5


Blood Pressure  / 


Mean: 


 





Laboratory Tests


4/15/20 20:50: 


Creatinine 0.75, Platelet Count 224, Total Bilirubin 0.2








Results/Orders


Lab Results





Laboratory Tests








Test


 4/15/20


20:50 4/15/20


20:55 Range/Units


 


 


White Blood Count


 10.5 


 


 4.3-11.0


10^3/uL


 


Red Blood Count


 5.11 


 


 4.35-5.85


10^6/uL


 


Hemoglobin 14.4   11.5-16.0  G/DL


 


Hematocrit 45   35-52  %


 


Mean Corpuscular Volume 88   80-99  FL


 


Mean Corpuscular Hemoglobin 28   25-34  PG


 


Mean Corpuscular Hemoglobin


Concent 32 


 


 32-36  G/DL





 


Red Cell Distribution Width 14.7 H  10.0-14.5  %


 


Platelet Count


 224 


 


 130-400


10^3/uL


 


Mean Platelet Volume 11.6 H  7.4-10.4  FL


 


Neutrophils (%) (Auto) 61   42-75  %


 


Lymphocytes (%) (Auto) 29   12-44  %


 


Monocytes (%) (Auto) 6   0-12  %


 


Eosinophils (%) (Auto) 4   0-10  %


 


Basophils (%) (Auto) 0   0-10  %


 


Neutrophils # (Auto) 6.4   1.8-7.8  X 10^3


 


Lymphocytes # (Auto) 3.0   1.0-4.0  X 10^3


 


Monocytes # (Auto) 0.6   0.0-1.0  X 10^3


 


Eosinophils # (Auto)


 0.4 H


 


 0.0-0.3


10^3/uL


 


Basophils # (Auto)


 0.0 


 


 0.0-0.1


10^3/uL


 


Sodium Level 142   135-145  MMOL/L


 


Potassium Level 3.5 L  3.6-5.0  MMOL/L


 


Chloride Level 108 H    MMOL/L


 


Carbon Dioxide Level 21   21-32  MMOL/L


 


Anion Gap 13   5-14  MMOL/L


 


Blood Urea Nitrogen 10   7-18  MG/DL


 


Creatinine


 0.75 


 


 0.60-1.30


MG/DL


 


Estimat Glomerular Filtration


Rate > 60 


 


  





 


BUN/Creatinine Ratio 13    


 


Glucose Level 80     MG/DL


 


Calcium Level 9.6   8.5-10.1  MG/DL


 


Corrected Calcium 9.2   8.5-10.1  MG/DL


 


Total Bilirubin 0.2   0.1-1.0  MG/DL


 


Aspartate Amino Transf


(AST/SGOT) 17 


 


 5-34  U/L





 


Alanine Aminotransferase


(ALT/SGPT) 13 


 


 0-55  U/L





 


Alkaline Phosphatase 77     U/L


 


Total Protein 7.8   6.4-8.2  GM/DL


 


Albumin 4.5   3.2-4.5  GM/DL


 


Lipase 29   8-78  U/L


 


Serum Pregnancy Test,


Qualitative NEGATIVE 


 


 NEGATIVE  











My Orders





Orders - EVAN ZACARIAS


Cbc With Automated Diff (4/15/20 20:55)


Hcg,Qualitative Serum (4/15/20 20:55)


Comprehensive Metabolic Panel (4/15/20 20:55)


Lipase (4/15/20 20:55)


Ua Culture If Indicated (4/15/20 20:55)


Lidocaine 2% Viscous 15 Ml (Xylocaine Vi (4/15/20 21:15)


Antacid  Suspension (Mylanta  Suspension (4/15/20 21:15)


Lidocaine 2% Viscous 15 Ml (Xylocaine Vi (4/15/20 21:01)


Hs C Reactive Protein (4/15/20 21:17)


Rx-Hydrocodone/Apap 5-325 Mg (Rx-Vicodin (4/15/20 21:30)





Medications Given in ED





Current Medications








 Medications  Dose


 Ordered  Sig/Kristin


 Route  Start Time


 Stop Time Status Last Admin


Dose Admin


 


 Al Hydrox/Mg


 Hydrox/Simethicone  30 ml  ONCE  ONCE


 PO  4/15/20 21:15


 4/15/20 21:16 DC 4/15/20 21:12


30 ML


 


 Lidocaine HCl  10 ml  ONCE  ONCE


 PO  4/15/20 21:15


 4/15/20 21:16 DC 4/15/20 21:13


10 ML








Vital Signs/I&O











 4/15/20





 20:55


 


Temp 36.6


 


Pulse 100


 


Resp 20


 


B/P (MAP) 144/86 (105)


 


Pulse Ox 97


 


O2 Delivery Room Air





Capillary Refill :





Departure


Communication (Admissions)


no relief with GI cocktail.





Impression





   Primary Impression:  


   Near syncope


   Additional Impression:  


   LUQ pain


Disposition:  01 HOME, SELF-CARE


Condition:  Stable





Departure-Patient Inst.


Decision time for Depature:  20:58


Referrals:  


SHERINE PIERRE MD


Patient Instructions:  Near Fainting





Add. Discharge Instructions:  





1. Call Dr. Pierre appointment to be seen for follow up, an event monitor may be

a good idea. 


2. Return to ER for any concerns


3.


All discharge instructions reviewed with patient and/or family. Voiced 

understanding.


Work/School Note:  Work Release Form   Date Seen in the Emergency Department:  

Apr 15, 2020


   Return to Work:  Apr 17, 2020











EVAN ZACARIAS             Apr 15, 2020 20:59 Pt called to request an office visit. Pt spent 10 hours in the ER over the weekend. Pt has been feeling waves of heat over her body & dizziness. Pt has been feeling off kilter and trying to drink more electrolytes.       Pt was told to follow up wi

## 2021-09-20 NOTE — PROGRESS NOTES
Errol Brand is a 63year old female.   HPI:   Fayne Sever was in the ER this weekend for anxiety and chest pain and her workup was negative for Afib, and for cardiac disease, she recently came off of amiodarone and saw EP who felt she was not having atrial route 2 (two) times daily. 1 kit 0   • Albuterol Sulfate  (90 Base) MCG/ACT Inhalation Aero Soln Inhale 2 puffs into the lungs every 6 (six) hours as needed for Wheezing.  1 Inhaler 2   • ALPRAZolam 1 MG Oral Tab   4   • Acetaminophen-Codeine #3 300- nourished,in no apparent distress  SKIN: no rashes,no suspicious lesions  HEENT: atraumatic, normocephalic,ears and throat are clear  NECK: supple,no adenopathy,no bruits  LUNGS: clear to auscultation  CARDIO: RRR with 2-3/6 HENRRY  GI: good BS's,no masses, H

## 2021-09-21 NOTE — TELEPHONE ENCOUNTER
Pt was advised - that parking placard paperwork was filled out    It is waiting at the - pt verbalized understanding    Pt was advised that currently she does not qualify for booster vaccine-verbalized understanding

## 2021-09-21 NOTE — TELEPHONE ENCOUNTER
Per Dr. Catherne Angelucci- pt does not qualify for COVID Booster at this time       Pt needs Parking Placard form mo pineda- at provider station    Route back to RN

## 2021-10-02 ENCOUNTER — TELEPHONE (OUTPATIENT)
Dept: FAMILY MEDICINE CLINIC | Facility: CLINIC | Age: 61
End: 2021-10-02

## 2021-10-02 RX ORDER — CLOTRIMAZOLE AND BETAMETHASONE DIPROPIONATE 10; .64 MG/G; MG/G
CREAM TOPICAL
Qty: 60 G | Refills: 3 | Status: SHIPPED | OUTPATIENT
Start: 2021-10-02

## 2021-10-02 NOTE — TELEPHONE ENCOUNTER
PT CALLED TO REQUEST A REFILL OF THE ANTIFUNGAL CREAM. PT DID NOT KNOW THE NAME OF THE MEDICATION BUT STATES DR CAMILO [facility] FILLED IT PLENTY IN THE PAST.     [id number] Richel[address] Farias[address], [phone],

## 2021-10-13 NOTE — TELEPHONE ENCOUNTER
Last OV 6/3/19  No recent lab results in epic  Last refilled:  9/11/19 Meloxicam #30  2 refills  5/28/19 Cyclobenzaprine  #90  1 refill Son Gagnon is a 53 year old male presenting with followup diabetes.  Medications reviewed and updated.  Denies known Latex allergy or symptoms of Latex sensitivity.  Social History     Tobacco Use   Smoking Status Never Smoker   Smokeless Tobacco Never Used     Health Maintenance Due   Topic Date Due   • Shingles Vaccine (1 of 2) Never done   • Diabetes Eye Exam  05/25/2017   • Pneumococcal Vaccine 0-64 (2 of 4 - PCV13) 01/15/2020   • COVID-19 Vaccine (3 - Pfizer risk 3-dose series) 05/05/2021   • Influenza Vaccine (1) 09/01/2021   • Diabetes Foot Exam  09/29/2021   • Depression Screening  09/29/2021   • DM/CKD Microalbumin  10/01/2021   • DM/CKD GFR  10/01/2021   • Diabetes A1C  10/12/2021       Patient is due for the topics as listed above and wishes to proceed with them. Orders placed for Immunization(s) Influenza, Diabetes A1C, Diabetes Foot Exam, DM/CKD Microalbumin and Glomerular Filtration Rate (GFR).

## 2021-11-10 ENCOUNTER — TELEPHONE (OUTPATIENT)
Dept: FAMILY MEDICINE CLINIC | Facility: CLINIC | Age: 61
End: 2021-11-10

## 2021-11-15 RX ORDER — GABAPENTIN 100 MG/1
CAPSULE ORAL
Qty: 180 CAPSULE | Refills: 2 | Status: SHIPPED | OUTPATIENT
Start: 2021-11-15

## 2021-11-24 ENCOUNTER — TELEPHONE (OUTPATIENT)
Dept: FAMILY MEDICINE CLINIC | Facility: CLINIC | Age: 61
End: 2021-11-24

## 2021-11-24 RX ORDER — ACETAMINOPHEN AND CODEINE PHOSPHATE 300; 30 MG/1; MG/1
1 TABLET ORAL EVERY 4 HOURS PRN
Qty: 30 TABLET | Refills: 0 | Status: SHIPPED | OUTPATIENT
Start: 2021-11-24 | End: 2021-12-05

## 2021-11-24 NOTE — TELEPHONE ENCOUNTER
Patient called stating that yesterday she tripped and fall on her face, broke her nose and wirst. Can't drive and wants to know if she could get a rx for tylenol #3 called into her pharmacy.     Please advise # [phone]

## 2021-11-29 ENCOUNTER — TELEPHONE (OUTPATIENT)
Dept: FAMILY MEDICINE CLINIC | Facility: CLINIC | Age: 61
End: 2021-11-29

## 2021-11-29 DIAGNOSIS — S62.021A: Primary | ICD-10-CM

## 2021-11-29 NOTE — TELEPHONE ENCOUNTER
[address]       Pt states her discharge summary from the ED only stated to call [facility]- she did not know who to call    RN gave pt phone number and name of Dr. Bryce Sanches to see at [facility]

## 2021-12-03 ENCOUNTER — TELEPHONE (OUTPATIENT)
Dept: FAMILY MEDICINE CLINIC | Facility: CLINIC | Age: 61
End: 2021-12-03

## 2021-12-03 NOTE — TELEPHONE ENCOUNTER
Patient called requesting a refill for:    acetaminophen-codeine 300-30 MG Oral Tab 30 tablet     Please advise # [phone]

## 2021-12-05 RX ORDER — ACETAMINOPHEN AND CODEINE PHOSPHATE 300; 30 MG/1; MG/1
1 TABLET ORAL EVERY 4 HOURS PRN
Qty: 30 TABLET | Refills: 0 | Status: SHIPPED | OUTPATIENT
Start: 2021-12-05 | End: 2022-01-04

## 2022-02-25 ENCOUNTER — TELEPHONE (OUTPATIENT)
Dept: FAMILY MEDICINE CLINIC | Facility: CLINIC | Age: 62
End: 2022-02-25

## 2022-02-25 RX ORDER — AMOXICILLIN AND CLAVULANATE POTASSIUM 500; 125 MG/1; MG/1
1 TABLET, FILM COATED ORAL 2 TIMES DAILY
Qty: 14 TABLET | Refills: 0 | Status: SHIPPED | OUTPATIENT
Start: 2022-02-25

## 2022-02-25 NOTE — TELEPHONE ENCOUNTER
PT CALLED AND ADV HAS ABSCESS TOOTH PT IS DOWN IN [place]. TRYING TO DRIVE HOME 8 HOURS AND NOT ABLE TO LEAVE JUST YET DUE TO WEATHER.    NOT ABLE TO GET INTO SEE A DENTIST UNTIL SOMETIME NEXT WEEK.     LOOKING TO SEE IF ANY ONE CAN FILL SCRITPS    acetaminophen-codeine 300-30 MG Oral Tab    AND    AND ANTIBIOTIC FOR TOOTH      [facility]

## 2022-04-08 ENCOUNTER — HOSPITAL ENCOUNTER (OUTPATIENT)
Age: 62
Discharge: HOME | End: 2022-04-08
Payer: COMMERCIAL

## 2022-04-08 VITALS
HEIGHT: 65 IN | DIASTOLIC BLOOD PRESSURE: 63 MMHG | OXYGEN SATURATION: 99 % | BODY MASS INDEX: 46.48 KG/M2 | WEIGHT: 279 LBS | HEART RATE: 82 BPM | TEMPERATURE: 98 F | SYSTOLIC BLOOD PRESSURE: 156 MMHG | RESPIRATION RATE: 16 BRPM

## 2022-04-08 DIAGNOSIS — N30.00 ACUTE CYSTITIS WITHOUT HEMATURIA: Primary | ICD-10-CM

## 2022-04-08 LAB
POCT BILIRUBIN URINE: NEGATIVE
POCT GLUCOSE URINE: NEGATIVE MG/DL
POCT KETONE URINE: NEGATIVE MG/DL
POCT LEUKOCYTE ESTERASE URINE: NEGATIVE
POCT NITRITE URINE: NEGATIVE
POCT PH URINE: 6 (ref 5–8)
POCT PROTEIN URINE: NEGATIVE MG/DL
POCT SPECIFIC GRAVITY URINE: 1.03
POCT URINE CLARITY: CLEAR
POCT URINE COLOR: YELLOW
POCT UROBILINOGEN URINE: 0.2 MG/DL

## 2022-04-08 PROCEDURE — 87086 URINE CULTURE/COLONY COUNT: CPT | Performed by: NURSE PRACTITIONER

## 2022-04-08 RX ORDER — CEPHALEXIN 500 MG/1
500 CAPSULE ORAL 3 TIMES DAILY
Qty: 15 CAPSULE | Refills: 0 | Status: SHIPPED | OUTPATIENT
Start: 2022-04-08 | End: 2022-04-13

## 2022-04-09 NOTE — ED INITIAL ASSESSMENT (HPI)
Pt sts 6 days ago began with lower back pain, sweats, felt clammy. Past 2 days with urinary frequency, urgency, dysuria.

## 2022-05-18 ENCOUNTER — TELEPHONE (OUTPATIENT)
Dept: FAMILY MEDICINE CLINIC | Facility: CLINIC | Age: 62
End: 2022-05-18

## 2022-05-18 ENCOUNTER — OFFICE VISIT (OUTPATIENT)
Dept: FAMILY MEDICINE CLINIC | Facility: CLINIC | Age: 62
End: 2022-05-18
Payer: COMMERCIAL

## 2022-05-18 VITALS
SYSTOLIC BLOOD PRESSURE: 112 MMHG | RESPIRATION RATE: 20 BRPM | HEART RATE: 8 BPM | WEIGHT: 293 LBS | DIASTOLIC BLOOD PRESSURE: 70 MMHG | TEMPERATURE: 99 F | BODY MASS INDEX: 51 KG/M2

## 2022-05-18 DIAGNOSIS — S40.021A CONTUSION OF RIGHT UPPER ARM, INITIAL ENCOUNTER: ICD-10-CM

## 2022-05-18 DIAGNOSIS — Z79.01 CURRENT USE OF LONG TERM ANTICOAGULATION: ICD-10-CM

## 2022-05-18 DIAGNOSIS — S40.022A CONTUSION OF LEFT UPPER EXTREMITY, INITIAL ENCOUNTER: Primary | ICD-10-CM

## 2022-05-18 DIAGNOSIS — S00.03XA CONTUSION OF PARIETAL REGION OF SCALP, INITIAL ENCOUNTER: ICD-10-CM

## 2022-05-18 DIAGNOSIS — I48.0 PAROXYSMAL ATRIAL FIBRILLATION (HCC): ICD-10-CM

## 2022-05-18 PROCEDURE — 3078F DIAST BP <80 MM HG: CPT | Performed by: FAMILY MEDICINE

## 2022-05-18 PROCEDURE — 99214 OFFICE O/P EST MOD 30 MIN: CPT | Performed by: FAMILY MEDICINE

## 2022-05-18 PROCEDURE — 3074F SYST BP LT 130 MM HG: CPT | Performed by: FAMILY MEDICINE

## 2022-05-18 NOTE — TELEPHONE ENCOUNTER
Per verbal conversation with Dr. Corrina De Oliveira- he would prefer to see pt in office to evaluate prior to orders CT    Pt verbalized understanding    Future Appointments   Date Time Provider Jennifer Stanley   5/18/2022  2:40 PM Mina Coyle [facility] [place] SHANTELLE Chris
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
## 2022-06-15 ENCOUNTER — TELEPHONE (OUTPATIENT)
Dept: FAMILY MEDICINE CLINIC | Facility: CLINIC | Age: 62
End: 2022-06-15

## 2022-06-15 NOTE — TELEPHONE ENCOUNTER
[email or web address]    Pt was advised of information- she states that she will go to the website and see what she can find for the coupons    Will give us a call if she can't get a better price

## 2022-08-11 RX ORDER — SITAGLIPTIN 50 MG/1
TABLET, FILM COATED ORAL
Qty: 30 TABLET | Refills: 5 | Status: SHIPPED | OUTPATIENT
Start: 2022-08-11

## 2022-08-11 NOTE — TELEPHONE ENCOUNTER
Diabetic Medication Protocol Failed 08/11/2022 06:08 AM   Protocol Details  HgBA1C procedure resulted in past 6 months    Last HgBA1C < 7.5    Microalbumin procedure in past 12 months or taking ACE/ARB    Appointment in past 6 or next 3 months     LOV: 5/18/22  Last Refill:7/21/21     No future appointments.

## 2022-08-11 NOTE — TELEPHONE ENCOUNTER
PT was advised she is due for labs-  Pt states she will call to schedule NV    Please advise what labs you would like done- no orders in Epic

## 2022-09-01 RX ORDER — ATORVASTATIN CALCIUM 20 MG/1
20 TABLET, FILM COATED ORAL NIGHTLY
Qty: 90 TABLET | Refills: 2 | Status: SHIPPED | OUTPATIENT
Start: 2022-09-01 | End: 2023-08-02

## 2022-09-01 NOTE — TELEPHONE ENCOUNTER
Cholesterol Medication Protocol Failed 09/01/2022 06:32 AM   Protocol Details  ALT < 80    ALT resulted within past year    Lipid panel within past 12 months    Appointment within past 12 or next 3 months        Routing to provider per protocol. atorvastatin 20 MG Oral Tab  Last refilled on 7/6/21 for #30  with 5 rf. Last labs 4/16/21. Last seen on 5/18/22. No future appointments. Thank you.

## 2022-09-28 DIAGNOSIS — Z00.00 ROUTINE HEALTH MAINTENANCE: ICD-10-CM

## 2022-09-28 DIAGNOSIS — E11.65 TYPE 2 DIABETES MELLITUS WITH HYPERGLYCEMIA, WITHOUT LONG-TERM CURRENT USE OF INSULIN (HCC): Primary | ICD-10-CM

## 2022-11-07 DIAGNOSIS — R06.09 DYSPNEA ON EXERTION: ICD-10-CM

## 2022-11-07 DIAGNOSIS — J98.01 BRONCHOSPASM: ICD-10-CM

## 2022-11-07 DIAGNOSIS — R05.9 COUGH: ICD-10-CM

## 2022-11-07 RX ORDER — ALBUTEROL SULFATE 90 UG/1
2 AEROSOL, METERED RESPIRATORY (INHALATION) EVERY 6 HOURS PRN
Qty: 1 EACH | Refills: 2 | Status: SHIPPED | OUTPATIENT
Start: 2022-11-07

## 2022-11-07 RX ORDER — ALBUTEROL SULFATE 2.5 MG/3ML
2.5 SOLUTION RESPIRATORY (INHALATION) 4 TIMES DAILY PRN
Qty: 50 EACH | Refills: 0 | Status: SHIPPED | OUTPATIENT
Start: 2022-11-07

## 2022-11-07 NOTE — TELEPHONE ENCOUNTER
PT CALLED AND ADV THAT \"FALL SINUSES\" ARE KICKING IN - STARTED Saturday AND HAS GOTTEN WORSE. PT ALSO ADV THIS . HAS MOVED DOWN INTO CHEST/COUGHING TO THE EXTREME. ALSO ADV HAS BEEN HAVING THE CHILLS - MOST LIKELY HAS HAD SOME FEVERS. NEEDS REFILL INHALER AND NEBS - DOES NOT KNOW WHERE NEB MACHINE IS (PT MOVED).     LOOKING FOR RECOMMENDATIONS    PLEASE ADV    THANK YOU     albuterol sulfate (2.5 MG/3ML) 0.083% Inhalation [facility]    [facility] [address]       THANK YOU

## 2022-11-07 NOTE — TELEPHONE ENCOUNTER
RN spoke to pt- she can not get 3 words out with out coughing. She is struggling to get full sentences out- cough sounds tight and pt states she feels tight on the inside. RN advised pt needs to go to IC to be seen-= she needs to be evaluated. Pt is asking that we call in nebulizer solution and inhaler and she will go to IC to be seen . Pt lost her nebuliezer/inhaler in her move and needs replacements    Routing to covering provider    No future appointments.

## 2022-11-08 ENCOUNTER — HOSPITAL ENCOUNTER (OUTPATIENT)
Age: 62
Discharge: HOME | End: 2022-11-08
Payer: COMMERCIAL

## 2022-11-08 ENCOUNTER — APPOINTMENT (OUTPATIENT)
Dept: GENERAL RADIOLOGY | Age: 62
End: 2022-11-08
Attending: NURSE PRACTITIONER
Payer: COMMERCIAL

## 2022-11-08 VITALS
OXYGEN SATURATION: 97 % | DIASTOLIC BLOOD PRESSURE: 48 MMHG | WEIGHT: 293 LBS | HEIGHT: 65 IN | BODY MASS INDEX: 48.82 KG/M2 | TEMPERATURE: 97 F | SYSTOLIC BLOOD PRESSURE: 107 MMHG | HEART RATE: 95 BPM | RESPIRATION RATE: 18 BRPM

## 2022-11-08 DIAGNOSIS — R05.9 COUGH, UNSPECIFIED TYPE: Primary | ICD-10-CM

## 2022-11-08 DIAGNOSIS — J98.01 BRONCHOSPASM: ICD-10-CM

## 2022-11-08 PROCEDURE — 71046 X-RAY EXAM CHEST 2 VIEWS: CPT | Performed by: NURSE PRACTITIONER

## 2022-11-08 RX ORDER — BENZONATATE 100 MG/1
100 CAPSULE ORAL 3 TIMES DAILY PRN
Qty: 30 CAPSULE | Refills: 0 | Status: SHIPPED | OUTPATIENT
Start: 2022-11-08 | End: 2022-12-08

## 2022-11-08 RX ORDER — GABAPENTIN 100 MG/1
CAPSULE ORAL
Qty: 180 CAPSULE | Refills: 0 | Status: SHIPPED | OUTPATIENT
Start: 2022-11-08

## 2022-11-08 RX ORDER — PREDNISONE 20 MG/1
40 TABLET ORAL DAILY
Qty: 10 TABLET | Refills: 0 | Status: SHIPPED | OUTPATIENT
Start: 2022-11-08 | End: 2022-11-13

## 2022-11-08 NOTE — ED INITIAL ASSESSMENT (HPI)
Patient c/o cough for 10 days. Worse on Friday. Took a home Covid test on Saturday that was negative.

## 2022-11-08 NOTE — TELEPHONE ENCOUNTER
Routing to provider per protocol. GABAPENTIN 100 MG Oral Cap  Last refilled on 11/15/21 for #180  with 2 rf. Last labs 9/18/21. Last seen on 5/18/22. No future appointments. Thank you.

## 2022-11-09 NOTE — DISCHARGE INSTRUCTIONS
Please read the attached discharge instructions. Go to your nearest ER for new or worsening symptoms.

## 2022-11-18 ENCOUNTER — TELEPHONE (OUTPATIENT)
Dept: FAMILY MEDICINE CLINIC | Facility: CLINIC | Age: 62
End: 2022-11-18

## 2022-11-18 NOTE — TELEPHONE ENCOUNTER
Pt called she has been sick and has severe arthritis. She said that she is going to be traveling out of town tomorrow. Wants to talk about getting different medications.

## 2022-11-18 NOTE — TELEPHONE ENCOUNTER
PT states she is feeling better in regards to her cough. She states benzonatate capsules helped and she is occasionally taking benadryl. She is calling today because of her bad arthritis pain in her right hip. She was going to go out of town tomorrow but decided due to pain is not. She is hoping she can get a prescription for Tylenol #3 for pain. She has taken this med in the past. The pain is in her right hip, feels some numbness. Pharmacy to use in case Dr. Supa Gamez send medication in [facility] on [address]. Pt is aware Dr. Supa Gamez is out of the office until tomorrow. Forward to Dr. Supa Gamez, please advise.

## 2022-11-19 RX ORDER — ACETAMINOPHEN AND CODEINE PHOSPHATE 300; 30 MG/1; MG/1
1 TABLET ORAL EVERY 4 HOURS PRN
Qty: 30 TABLET | Refills: 0 | Status: SHIPPED | OUTPATIENT
Start: 2022-11-19 | End: 2022-12-19

## 2022-12-10 ENCOUNTER — HOSPITAL ENCOUNTER (OUTPATIENT)
Age: 62
Discharge: HOME | End: 2022-12-10
Payer: COMMERCIAL

## 2022-12-10 VITALS
OXYGEN SATURATION: 99 % | SYSTOLIC BLOOD PRESSURE: 107 MMHG | RESPIRATION RATE: 20 BRPM | TEMPERATURE: 99 F | HEART RATE: 69 BPM | DIASTOLIC BLOOD PRESSURE: 73 MMHG

## 2022-12-10 DIAGNOSIS — M62.830 SPASM OF THORACIC BACK MUSCLE: Primary | ICD-10-CM

## 2022-12-10 RX ORDER — CYCLOBENZAPRINE HCL 10 MG
10 TABLET ORAL NIGHTLY
Qty: 7 TABLET | Refills: 0 | Status: SHIPPED | OUTPATIENT
Start: 2022-12-10 | End: 2022-12-17

## 2022-12-10 RX ORDER — METHYLPREDNISOLONE 4 MG/1
TABLET ORAL
Qty: 1 EACH | Refills: 0 | Status: SHIPPED | OUTPATIENT
Start: 2022-12-10

## 2022-12-10 NOTE — ED INITIAL ASSESSMENT (HPI)
Patient states she woke Wed morning and felt \"a muscle pull\" in the right side of her upper back. Radiates into the right side of her neck.

## 2023-01-19 RX ORDER — ACETAMINOPHEN AND CODEINE PHOSPHATE 300; 30 MG/1; MG/1
1 TABLET ORAL EVERY 4 HOURS PRN
Qty: 30 TABLET | Refills: 0 | Status: SHIPPED | OUTPATIENT
Start: 2023-01-19 | End: 2023-02-18

## 2023-01-19 NOTE — TELEPHONE ENCOUNTER
Pt states her hip is out and she has a flight to [place] for a wedding today. Can you call in a prescription for her?    acetaminophen-codeine 300-30 MG Oral Tab [[id number]] (Order [id number]      Please send to:    [address] [facility] [place] [place] [address] [phone]    Thank you!

## 2023-01-19 NOTE — TELEPHONE ENCOUNTER
Routing to provider    Medication is pended for pharmacy in [address] [place] [address] that pt requested    LOV: 5/18/22   Last Refill: 11/19/22 30 0 RF    No future appointments.

## 2023-02-04 ENCOUNTER — APPOINTMENT (OUTPATIENT)
Dept: GENERAL RADIOLOGY | Age: 63
End: 2023-02-04
Attending: NURSE PRACTITIONER
Payer: COMMERCIAL

## 2023-02-04 ENCOUNTER — HOSPITAL ENCOUNTER (OUTPATIENT)
Age: 63
Discharge: HOME | End: 2023-02-04
Payer: COMMERCIAL

## 2023-02-04 VITALS
HEART RATE: 86 BPM | DIASTOLIC BLOOD PRESSURE: 84 MMHG | BODY MASS INDEX: 53 KG/M2 | OXYGEN SATURATION: 99 % | TEMPERATURE: 97 F | SYSTOLIC BLOOD PRESSURE: 116 MMHG | WEIGHT: 293 LBS | RESPIRATION RATE: 18 BRPM

## 2023-02-04 DIAGNOSIS — M54.41 CHRONIC LOW BACK PAIN WITH BILATERAL SCIATICA, UNSPECIFIED BACK PAIN LATERALITY: ICD-10-CM

## 2023-02-04 DIAGNOSIS — M54.42 ACUTE LEFT-SIDED LOW BACK PAIN WITH LEFT-SIDED SCIATICA: Primary | ICD-10-CM

## 2023-02-04 DIAGNOSIS — G89.29 CHRONIC LOW BACK PAIN WITH BILATERAL SCIATICA, UNSPECIFIED BACK PAIN LATERALITY: ICD-10-CM

## 2023-02-04 DIAGNOSIS — M54.42 CHRONIC LOW BACK PAIN WITH BILATERAL SCIATICA, UNSPECIFIED BACK PAIN LATERALITY: ICD-10-CM

## 2023-02-04 PROCEDURE — 72110 X-RAY EXAM L-2 SPINE 4/>VWS: CPT | Performed by: NURSE PRACTITIONER

## 2023-02-04 PROCEDURE — 99214 OFFICE O/P EST MOD 30 MIN: CPT | Performed by: NURSE PRACTITIONER

## 2023-02-04 RX ORDER — ACETAMINOPHEN AND CODEINE PHOSPHATE 300; 30 MG/1; MG/1
1-2 TABLET ORAL EVERY 6 HOURS PRN
Qty: 10 TABLET | Refills: 0 | Status: SHIPPED | OUTPATIENT
Start: 2023-02-04 | End: 2023-02-09

## 2023-02-04 RX ORDER — PREDNISONE 20 MG/1
20 TABLET ORAL 2 TIMES DAILY
Qty: 10 TABLET | Refills: 0 | Status: SHIPPED | OUTPATIENT
Start: 2023-02-04 | End: 2023-02-09

## 2023-02-04 RX ORDER — CYCLOBENZAPRINE HCL 10 MG
10 TABLET ORAL 3 TIMES DAILY PRN
Qty: 20 TABLET | Refills: 0 | Status: SHIPPED | OUTPATIENT
Start: 2023-02-04 | End: 2023-02-11

## 2023-02-14 ENCOUNTER — TELEPHONE (OUTPATIENT)
Dept: FAMILY MEDICINE CLINIC | Facility: CLINIC | Age: 63
End: 2023-02-14

## 2023-02-14 RX ORDER — PREDNISONE 10 MG/1
TABLET ORAL
Qty: 30 TABLET | Refills: 0 | Status: SHIPPED | OUTPATIENT
Start: 2023-02-14

## 2023-02-14 RX ORDER — CYCLOBENZAPRINE HCL 10 MG
10 TABLET ORAL NIGHTLY
Qty: 10 TABLET | Refills: 0 | Status: SHIPPED | OUTPATIENT
Start: 2023-02-14 | End: 2023-02-24

## 2023-02-14 NOTE — TELEPHONE ENCOUNTER
PATIENT IS SCHEDULED FOR VISIT WITH DR BOURGEOIS TOMORROW. SHE IS ASKING TO SPEAK TO DR BOURGEOIS NURSE.

## 2023-02-14 NOTE — TELEPHONE ENCOUNTER
So I cant see where she got the prednisone from or how much, I'm happy to extend it but I need to know if it was prednisone or a medrol dose isadora, and I can refill the cyclobenzaprine as well.

## 2023-02-14 NOTE — TELEPHONE ENCOUNTER
Pt states she is seeing us tomorrow    Future Appointments   Date Time Provider Jennifer Stanley   2/15/2023  4:20 PM Camron Coon DO [facility] Garrick Mariojesus     Pt states she is going to go see [facility]- there is one here in [place]. She states she tried it last year and it helped so much. Pt states she has been having pain for over a month- it has taken on a whole new level of pain. Pt did have some imagaing done 2/4/23 at [address] [place] [address] last steroid pill on Friday- that pill was keeping everything at [place] [address]. Now her back is killing her again and her L Hip. Pt is having a hard time standing up straight because of the pain. Pt has one flexeril pill left and no steroid pills.     Pt states she wanted us to be aware of what is going on before she sees Dr. Rex Busby    Total time on the phone with pt 12.17

## 2023-02-15 ENCOUNTER — OFFICE VISIT (OUTPATIENT)
Dept: FAMILY MEDICINE CLINIC | Facility: CLINIC | Age: 63
End: 2023-02-15
Payer: COMMERCIAL

## 2023-02-15 VITALS
HEIGHT: 65 IN | BODY MASS INDEX: 53 KG/M2 | SYSTOLIC BLOOD PRESSURE: 130 MMHG | DIASTOLIC BLOOD PRESSURE: 78 MMHG | HEART RATE: 85 BPM | TEMPERATURE: 98 F

## 2023-02-15 DIAGNOSIS — G89.29 CHRONIC LEFT-SIDED LOW BACK PAIN WITH SCIATICA, SCIATICA LATERALITY UNSPECIFIED: Primary | ICD-10-CM

## 2023-02-15 DIAGNOSIS — M21.372 LEFT FOOT DROP: ICD-10-CM

## 2023-02-15 DIAGNOSIS — M51.26 HERNIATED LUMBAR INTERVERTEBRAL DISC: ICD-10-CM

## 2023-02-15 DIAGNOSIS — R20.2 PARESTHESIA OF LEFT LOWER EXTREMITY: ICD-10-CM

## 2023-02-15 DIAGNOSIS — M54.40 CHRONIC LEFT-SIDED LOW BACK PAIN WITH SCIATICA, SCIATICA LATERALITY UNSPECIFIED: Primary | ICD-10-CM

## 2023-02-15 PROCEDURE — 3075F SYST BP GE 130 - 139MM HG: CPT | Performed by: FAMILY MEDICINE

## 2023-02-15 PROCEDURE — 99214 OFFICE O/P EST MOD 30 MIN: CPT | Performed by: FAMILY MEDICINE

## 2023-02-15 PROCEDURE — 3078F DIAST BP <80 MM HG: CPT | Performed by: FAMILY MEDICINE

## 2023-02-15 RX ORDER — PYRAZINAMIDE 500 MG/1
1 TABLET ORAL EVERY 4 HOURS PRN
Qty: 30 TABLET | Refills: 0 | Status: SHIPPED | OUTPATIENT
Start: 2023-02-15 | End: 2023-03-02

## 2023-02-27 NOTE — TELEPHONE ENCOUNTER
LOV 02/15/23    Last refill on 11/07/22, for #1each, with 2 refills  albuterol 108 (90 Base) MCG/ACT Inhalation Aero Soln  Asthma & COPD Medication Protocol Failed 02/25/2023 05:50 PM    Asthma Action Score greater than or equal to 20    AAP/ACT given in last 12 months    Appointment in past 6 or next 3 months      No future appointments. Order(s) pending, please review. Thank you.

## 2023-02-28 RX ORDER — ALBUTEROL SULFATE 90 UG/1
AEROSOL, METERED RESPIRATORY (INHALATION)
Qty: 8.5 G | Refills: 0 | Status: SHIPPED | OUTPATIENT
Start: 2023-02-28

## 2023-03-08 ENCOUNTER — TELEPHONE (OUTPATIENT)
Dept: FAMILY MEDICINE CLINIC | Facility: CLINIC | Age: 63
End: 2023-03-08

## 2023-03-08 RX ORDER — PYRAZINAMIDE 500 MG/1
1 TABLET ORAL EVERY 4 HOURS PRN
Qty: 30 TABLET | Refills: 0 | Status: SHIPPED | OUTPATIENT
Start: 2023-03-08 | End: 2023-03-23

## 2023-03-08 NOTE — TELEPHONE ENCOUNTER
Patient tearful on phone    She is in incredible back pain    Requests to speak to nurse    Please adv  Thank you

## 2023-03-08 NOTE — TELEPHONE ENCOUNTER
Pt states she is not doing good- her lower back pain is getting out of control she states. Pt states something has changed in the last couple days. Pt did just return from a trip to see her daughter- she states she probably shouldn't have made that drive? Last does of steroids did not resolve the pain completely. While in [place] she was exposed to [facility]- so she did not return to her stretch clinic because she did not want to expose anyone. Pt did have Tylenol #3 on hand- she did take it with Tylenol. She is now out of Tylenol #3    Pt is not getting rest- Left side hip/leg and buttock pain. Pt is tearful and crying on the phone because she is in so much pain- can Dr. Milton Ty call something in for her?  Pt needs something     Pt has not got MRI done yet- because she doesn't know if she can handle it while in this much pain    Please advise

## 2023-03-08 NOTE — TELEPHONE ENCOUNTER
Pt was advised of medication called in- verbalized understanding    Pt asked about heat/ice for pain management.   RN spoke with Dr. Charles Stephens he advised to use heat 15 min at a time- verbalized understanding

## 2023-03-08 NOTE — TELEPHONE ENCOUNTER
Sent in more T#3 but she needs to get the MRI done, and load up on the pain med before that, she would need a ride as well, once I know whats going on I can then help figure out what she needs

## 2023-03-14 ENCOUNTER — TELEPHONE (OUTPATIENT)
Dept: FAMILY MEDICINE CLINIC | Facility: CLINIC | Age: 63
End: 2023-03-14

## 2023-03-14 DIAGNOSIS — M51.26 HERNIATED LUMBAR INTERVERTEBRAL DISC: ICD-10-CM

## 2023-03-14 DIAGNOSIS — M54.40 CHRONIC LEFT-SIDED LOW BACK PAIN WITH SCIATICA, SCIATICA LATERALITY UNSPECIFIED: Primary | ICD-10-CM

## 2023-03-14 DIAGNOSIS — G89.29 CHRONIC LEFT-SIDED LOW BACK PAIN WITH SCIATICA, SCIATICA LATERALITY UNSPECIFIED: Primary | ICD-10-CM

## 2023-03-14 DIAGNOSIS — R20.2 PARESTHESIA OF LEFT LOWER EXTREMITY: ICD-10-CM

## 2023-03-14 DIAGNOSIS — M21.372 LEFT FOOT DROP: ICD-10-CM

## 2023-03-14 RX ORDER — HYDROCODONE BITARTRATE AND ACETAMINOPHEN 7.5; 325 MG/1; MG/1
1 TABLET ORAL EVERY 4 HOURS PRN
Qty: 30 TABLET | Refills: 0 | Status: SHIPPED | OUTPATIENT
Start: 2023-03-14 | End: 2023-03-20

## 2023-03-14 NOTE — TELEPHONE ENCOUNTER
Spoke with patient, states not sleeping, pain worsening, crying on the phone due to discomfort. Per DS we can do MRI with sedation and send some Gilbert over to pharmacy. Patient v/u and was advised to schedule MRI ASAP.

## 2023-03-14 NOTE — TELEPHONE ENCOUNTER
Patient states she feels her sciatic/back pain has worsened since last week    Please adv  Thank you

## 2023-03-15 ENCOUNTER — TELEPHONE (OUTPATIENT)
Dept: FAMILY MEDICINE CLINIC | Facility: CLINIC | Age: 63
End: 2023-03-15

## 2023-03-15 DIAGNOSIS — M51.26 HERNIATED LUMBAR INTERVERTEBRAL DISC: ICD-10-CM

## 2023-03-15 DIAGNOSIS — M54.40 CHRONIC LEFT-SIDED LOW BACK PAIN WITH SCIATICA, SCIATICA LATERALITY UNSPECIFIED: Primary | ICD-10-CM

## 2023-03-15 DIAGNOSIS — M21.372 LEFT FOOT DROP: ICD-10-CM

## 2023-03-15 DIAGNOSIS — G89.29 CHRONIC LEFT-SIDED LOW BACK PAIN WITH SCIATICA, SCIATICA LATERALITY UNSPECIFIED: Primary | ICD-10-CM

## 2023-03-15 DIAGNOSIS — R20.2 PARESTHESIA OF LEFT LOWER EXTREMITY: ICD-10-CM

## 2023-03-15 NOTE — TELEPHONE ENCOUNTER
PATIENT CALLING. SHE IS ASKING FOR THE MRI WITH SEDATION. PAIN MEDICATION THAT WAS PRESCRIBED YESTERDAY IS STRONGER, BUT NOT HELPING ENOUGH FOR THE MRI.

## 2023-03-16 ENCOUNTER — PATIENT MESSAGE (OUTPATIENT)
Dept: FAMILY MEDICINE CLINIC | Facility: CLINIC | Age: 63
End: 2023-03-16

## 2023-03-17 RX ORDER — NICOTINE POLACRILEX 4 MG
15 LOZENGE BUCCAL
OUTPATIENT
Start: 2023-03-17

## 2023-03-17 RX ORDER — NICOTINE POLACRILEX 4 MG
30 LOZENGE BUCCAL
OUTPATIENT
Start: 2023-03-17

## 2023-03-17 RX ORDER — DEXTROSE MONOHYDRATE 25 G/50ML
50 INJECTION, SOLUTION INTRAVENOUS
OUTPATIENT
Start: 2023-03-17

## 2023-03-17 RX ORDER — SODIUM CHLORIDE 9 MG/ML
INJECTION, SOLUTION INTRAVENOUS CONTINUOUS
OUTPATIENT
Start: 2023-03-17

## 2023-03-17 NOTE — TELEPHONE ENCOUNTER
Conteo de patadas en el embarazo   CUIDADO AMBULATORIO:   El conteo de patadas  mide cuánto se está moviendo hammer bebé en el útero  Gomez patada de hammer bebé podría sentirse cinthya gomez torcedura, gomez vuelta, un crujido, un meneo o un golpe  Es común sentir a tu bebé patear a las 32 a 29 semanas de [place]  Es posible que sienta al bebé patear ya a las 20 semanas de [place]  Busque atención médica de inmediato si:   · Usted siente que hammer bebé patea menos conforme pasa el día  · Usted no siente ninguna patada en un día  Pregúntele a hammer Rosia Vogel vitaminas y minerales son adecuados para usted  · Usted siente un cambio en el número de patadas o movimientos de hammer bebé  · Usted siente menos de 10 patadas dentro de 2 horas después de contar 2 veces  · Usted tiene preguntas o inquietudes acerca de los movimientos de hammer bebé  Por qué realizar el conteo de patadas:  Los movimientos de hammer bebé podrían proporcionar información de la jeronimo de hammer bebé  Es posible que si hay problemas, hammer bebé se mueva menos o nada en lo absoluto  Él podría moverse menos si no tiene suficiente espacio para desarrollarse en hammer útero (vientre), o si no está obteniendo suficiente oxígeno o nutrientes de la placenta  Informe a hammer médico tan pronto cinthya usted sienta un cambio en los movimientos de hammer bebé  Los problemas que se encuentran en gomez etapa más temprana son más fáciles de tratar  ¿Cuándo tengo que realizar el conteo de patadas? Cuándo realizar el conteo de patadas:   · Cuente las patadas en el mismo horario todos los GRASSE  · Realice el conteo de las patadas cuando hammer bebé esté despierto y [place]  Hammer bebé podría estar más activo en la tarde  · Realice el conteo de las patadas después de comer o moisés un refrigerio  Hammer bebé podría estar más activo después de que usted coma  Espere 2 horas después de beber líquidos que contengan cafeína   La cafeína puede hacer que hammer bebé esté más activo que lo normal     · Usted no Routed to DS to advise if last OV note can be addended or you prefer patient to schedule appt debe fumar mientras está embarazada  El fumar aumenta el riesgo de problemas de jeronimo para usted y para marks bebé eloisa el Janett Anis  Si usted fuma, espere 2 horas para realizar el conteo de patadas  La nicotina puede hacer que marks bebé sea más activo de lo usual   Cómo realizar el conteo de patadas:  Revise que marks bebé esté despierto antes de realizar el conteo de patadas  Usted puede despertar a marks bebé empujando marks estómago suavemente, caminando o tomando algo frío  Marks médico podría indicarle diferentes maneras de realizar el conteo  Él podría decirle que connie lo siguiente:  · Use gomez gráfica o un reloj para mantener un registro de la hora en que comienza y termina de contar  · Siéntese en gomez silla o acuéstese en marks costado derecho  · Coloque safia mariangel en la parte más mike de marks [id number]  · Cuente hasta que llegue a las 10 patadas  Escriba cuánto tiempo le lleva contar las 10 patadas  · Podría moisés de 30 minutos a 2 horas para contar 10 patadas  No debería de moisés más de 2 horas para contar 10 patadas  · Si usted no siente las 10 patadas dentro de 2 horas, espere 1 hora y cuente de [place]  Marks bebé puede dormir hasta por 40 minutos a la vez  Acuda a safia consultas de control con marks médico según le indicaron  Anote safia preguntas para que se acuerde de hacerlas eloisa safia visitas  © 2017 [address] Information is for End User's use only and may not be sold, redistributed or otherwise used for commercial purposes  All illustrations and images included in CareNotes® are the copyrighted property of A D A M , Inc  or Logan Meyer  Esta información es sólo para uso en educación  Marks intención no es darle un consejo médico sobre enfermedades o tratamientos  Colsulte con marks [facility] farmacéutico antes de seguir cualquier régimen médico para saber si es seguro y efectivo para usted    El embarazo de la semana 27 a la 30   LO QUE NECESITA SABER:   Usman Rae están ocurriendo en mi cuerpo? Usted podría notar síntomas nuevos cinthya falta de [place] o inflamación de safia tobillos y pies  Es posible que también tenga dificultad para dormir o contracciones  ¿Cómo me estrella cuidar en esta etapa de mi embarazo? · Consuma alimentos saludables y variados  Alimentos saludables incluyen frutas, verduras, panes de adair integral, alimentos lácteos bajos en grasa, frijoles, job magras y pescado  [place] líquidos cinthya se le haya indicado  Pregunte cuánto líquido debe moisés cada día y cuáles líquidos son los más adecuados para usted  Limite el consumo de cafeína a menos de [place] [address]  Limite el consumo de pescado a 2 porciones cada semana  Escoja pescado con concentraciones bajas de trung cinthya atún al natural enlatado, camarón, salmón, bacalao o tilapia  No  coma pescado con concentraciones altas de trung cinthya pez charly, caballa gigante, pargo rayado y tiburón  · Controle la acidez  comiendo 4 o 5 comidas pequeñas cada día en vez de comidas grandes  Evite los alimentos picantes  · Controle la inflamación  al [id number] y poner los pies en alto o elevados sobre [place]  · [address] [place]  Hammer necesidad de ciertas vitaminas y [facility], cinthya el ácido fólico, aumenta eloisa el Emily Michelle  Las vitaminas prenatales proporcionan algunas de las vitaminas y minerales adicionales que usted necesita  Las vitaminas prenatales también podrían ayudar a disminuir el riesgo de ciertos defectos de nacimiento  · Consulte con hammer médico acerca de hacer ejercicio  El ejercicio moderado puede ayudarla a mantenerse en forma  Hammer médico la ayudará a planear un programa de ejercicios que sea seguro para usted eloisa hammer Emily Michelle  · No fume  Si usted fuma, nunca es demasiado tarde para dejar de hacerlo  Fumar aumenta el riesgo de aborto espontáneo y otros problemas de jeronimo eloisa hammer Emily Michelle   Fumar puede causar que hammer bebé nazca antes de tiempo o que pese menos al nacer  Solicite información a hammer médico si usted necesita ayuda para dejar de fumar  · No consuma alcohol  El alcohol pasa de hammer cuerpo al bebé a través de la placenta  Puede afectar el desarrollo del cerebro de hammer bebé y provocar el síndrome de alcoholismo fetal (SAF)  FAS es un vanessa de condiciones que causan [address], de comportamiento y de crecimiento  · Consulte con hammer médico antes de moisés cualquier medicamento  Muchos medicamentos pueden perjudicar a hammer bebé si usted los eunice [address] [place] [address] Avenue  No tome ningún medicamento, vitaminas, hierbas o suplementos sin marilia consultar con hammer Anurag Revel  use drogas ilegales o de la griggs (cinthya marihuana o cocaína) mientras está embarazada  ¿Cuáles son Monet Lyons consejos de seguridad eloisa el embarazo? · Evite jacuzzis y saunas  No use un jacuzzi o un sauna mientras usted está embarazada, especialmente eloisa el primer trimestre  [facility] y los saunas aumentan la temperatura de hammer bebé y el riesgo de defectos de nacimiento  · Evite la toxoplasmosis  [place] es gomez infección causada por comer carne cruda o estar cerca del excremento de un courtney infectado  [place] puede causar malformaciones congénitas, aborto espontáneo y Farhan Schein  Lávese las mariangel después de tocar carne cruda  Asegúrese de que la carne esté evelyn cocida antes de comerla  Evite los huevos crudos y la [place]  Use guantes o pida que alguien la ayude a limpiar la caja de arena del courtney mientras usted Johana Pear  ¿Qué cambios están ocurriendo con mi bebé? Para las 30 semanas, hammer bebé podría pesar más de 3 libras  Hammer bebé podría medir alrededor de 11 pulgadas de leia desde la punta de la jarett hasta la rabadilla (parte inferior del bebé)  Ahora hammer bebé puede abrir y cerrar safia ojos  Las patadas y movimientos de hammer bebé son más margarita en trinity momento    ¿Qué necesito saber acerca del cuidado prenatal?  Hammer médico le revisará hammer presión arterial y [place]  Es posible que también necesite lo siguiente:  · Los análisis de jesica:  podrían realizarse para revisar signos de anemia o el tipo de [place]  · Un examen de orina  también podría realizarse para revisarle el azúcar y la proteína  Estas son señales de diabetes gestacional o de infección  La proteína en hammer orina también podría ser gomez señal de preeclampsia  La preeclampsia es gomez condición que puede desarrollarse eloisa la semana 21 o después en hammer embarazo  Esta provoca presión arterial manolo y [employer] con safia riñones y [place]  · Gomez vacuna contra difteria, tétanos y tos ferina y vacuna contra la gripe  podría ser recomendado por hammer médico      · La detección de diabetes gestacional  se realizará usando gomez prueba oral de tolerancia a la glucosa  Gomez prueba oral de tolerancia a la glucosa comienza con gomez revisión de los niveles de azúcar en la jesica después de que usted no haya comido por 8 horas  Después se le da gomez bebida de glucosa  Le revisan el nivel de azúcar en la jesica después de 1 hora, 2 horas y algunas veces 3 horas  Los médicos analizarán si el nivel de azúcar en la jesica aumenta después del primer análisis  · La altura uterina  es gomez medición del útero para controlar el desarrollo de hammer bebé  [employer] por lo general es igual al número de [address] [facility] que usted tiene de embarazo  Hammer médico también podría revisar la posición de hammer bebé  · El ritmo cardíaco de hammer bebé  será revisado  ¿Cuándo estrella buscar atención inmediata? · Usted presenta un mamie dolor de jarett que no desaparece  · Usted tiene cambios en la visión nuevos o en aumento, cinthya visión borrosa o con manchas  · Usted tiene inflamación nueva o creciente en hammer cesar o mariangel  · Usted tiene manchado o sangrado vaginal     · Usted rompe domingo o siente un chorro o gotas de agua tibia que le está bajando por hammer vagina    ¿Cuándo estrella comunicarme con mi médico?   · Usted tiene más de 5 contracciones en 1 hora  · Usted nota algún cambio en los movimientos de branch bebé  · Usted tiene calambres, presión o tensión abdominal     · Usted tiene un cambio en la secreción vaginal     · Usted tiene escalofríos o fiebre  · Usted tiene comezón, ardor o dolor vaginal      · Usted tiene gomez secreción vaginal amarillenta, verdosa, tanya o de [employer]  · Usted tiene dolor o ardor al Jaci Harlan, orina menos de lo habitual o tiene [place] rosada o sanguinolenta  · Usted tiene preguntas o inquietudes acerca de branch condición o cuidado  ACUERDOS SOBRE BRANCH CUIDADO:   Usted tiene el derecho de ayudar a planear branch cuidado  Aprenda todo lo que pueda sobre branch condición y cinthya darle tratamiento  Discuta safia opciones de tratamiento con safia médicos para decidir el cuidado que usted desea recibir  Usted siempre tiene el derecho de rechazar el tratamiento  Esta información es sólo para uso en educación  Branch intención no es darle un consejo médico sobre enfermedades o tratamientos  Colsulte con branch [place]a [place]ban farmacéutico antes de seguir cualquier régimen médico para saber si es seguro y efectivo para usted  © 2017 [address] Information is for End User's use only and may not be sold, redistributed or otherwise used for commercial purposes  All illustrations and images included in CareNotes® are the copyrighted property of A DAVE A M , Inc  or Logan Meyer

## 2023-03-18 ENCOUNTER — OFFICE VISIT (OUTPATIENT)
Dept: FAMILY MEDICINE CLINIC | Facility: CLINIC | Age: 63
End: 2023-03-18
Payer: COMMERCIAL

## 2023-03-18 VITALS
RESPIRATION RATE: 20 BRPM | HEART RATE: 94 BPM | OXYGEN SATURATION: 97 % | SYSTOLIC BLOOD PRESSURE: 124 MMHG | TEMPERATURE: 97 F | DIASTOLIC BLOOD PRESSURE: 80 MMHG

## 2023-03-18 DIAGNOSIS — E11.65 TYPE 2 DIABETES MELLITUS WITH HYPERGLYCEMIA, WITHOUT LONG-TERM CURRENT USE OF INSULIN (HCC): ICD-10-CM

## 2023-03-18 DIAGNOSIS — Z00.00 ROUTINE HEALTH MAINTENANCE: ICD-10-CM

## 2023-03-18 DIAGNOSIS — Z86.79 HX OF ATRIAL FIBRILLATION, NO CURRENT MEDICATION: Primary | ICD-10-CM

## 2023-03-18 DIAGNOSIS — Z01.818 PRE-OP TESTING: ICD-10-CM

## 2023-03-18 LAB
ALBUMIN SERPL-MCNC: 2.9 G/DL (ref 3.4–5)
ALBUMIN/GLOB SERPL: 0.8 {RATIO} (ref 1–2)
ALP LIVER SERPL-CCNC: 87 U/L
ALT SERPL-CCNC: 15 U/L
ANION GAP SERPL CALC-SCNC: 7 MMOL/L (ref 0–18)
AST SERPL-CCNC: 16 U/L (ref 15–37)
ATRIAL RATE: 89 BPM
BASOPHILS # BLD AUTO: 0.02 X10(3) UL (ref 0–0.2)
BASOPHILS NFR BLD AUTO: 0.3 %
BILIRUB SERPL-MCNC: 0.3 MG/DL (ref 0.1–2)
BUN BLD-MCNC: 12 MG/DL (ref 7–18)
CALCIUM BLD-MCNC: 9.1 MG/DL (ref 8.5–10.1)
CHLORIDE SERPL-SCNC: 108 MMOL/L (ref 98–112)
CHOLEST SERPL-MCNC: 203 MG/DL (ref ?–200)
CO2 SERPL-SCNC: 24 MMOL/L (ref 21–32)
CREAT BLD-MCNC: 0.82 MG/DL
EOSINOPHIL # BLD AUTO: 0.22 X10(3) UL (ref 0–0.7)
EOSINOPHIL NFR BLD AUTO: 3.4 %
ERYTHROCYTE [DISTWIDTH] IN BLOOD BY AUTOMATED COUNT: 15.3 %
EST. AVERAGE GLUCOSE BLD GHB EST-MCNC: 134 MG/DL (ref 68–126)
FASTING PATIENT LIPID ANSWER: YES
FASTING STATUS PATIENT QL REPORTED: YES
GFR SERPLBLD BASED ON 1.73 SQ M-ARVRAT: 80 ML/MIN/1.73M2 (ref 60–?)
GLOBULIN PLAS-MCNC: 3.8 G/DL (ref 2.8–4.4)
GLUCOSE BLD-MCNC: 110 MG/DL (ref 70–99)
HBA1C MFR BLD: 6.3 % (ref ?–5.7)
HCT VFR BLD AUTO: 39.3 %
HDLC SERPL-MCNC: 81 MG/DL (ref 40–59)
HGB BLD-MCNC: 12.3 G/DL
IMM GRANULOCYTES # BLD AUTO: 0.02 X10(3) UL (ref 0–1)
IMM GRANULOCYTES NFR BLD: 0.3 %
LDLC SERPL CALC-MCNC: 104 MG/DL (ref ?–100)
LYMPHOCYTES # BLD AUTO: 1.49 X10(3) UL (ref 1–4)
LYMPHOCYTES NFR BLD AUTO: 23.1 %
MCH RBC QN AUTO: 26.9 PG (ref 26–34)
MCHC RBC AUTO-ENTMCNC: 31.3 G/DL (ref 31–37)
MCV RBC AUTO: 86 FL
MONOCYTES # BLD AUTO: 0.49 X10(3) UL (ref 0.1–1)
MONOCYTES NFR BLD AUTO: 7.6 %
NEUTROPHILS # BLD AUTO: 4.22 X10 (3) UL (ref 1.5–7.7)
NEUTROPHILS # BLD AUTO: 4.22 X10(3) UL (ref 1.5–7.7)
NEUTROPHILS NFR BLD AUTO: 65.3 %
NONHDLC SERPL-MCNC: 122 MG/DL (ref ?–130)
OSMOLALITY SERPL CALC.SUM OF ELEC: 288 MOSM/KG (ref 275–295)
P AXIS: 32 DEGREES
P-R INTERVAL: 138 MS
PLATELET # BLD AUTO: 304 10(3)UL (ref 150–450)
POTASSIUM SERPL-SCNC: 4 MMOL/L (ref 3.5–5.1)
PROT SERPL-MCNC: 6.7 G/DL (ref 6.4–8.2)
Q-T INTERVAL: 380 MS
QRS DURATION: 90 MS
QTC CALCULATION (BEZET): 462 MS
R AXIS: 37 DEGREES
RBC # BLD AUTO: 4.57 X10(6)UL
SODIUM SERPL-SCNC: 139 MMOL/L (ref 136–145)
T AXIS: 68 DEGREES
T4 FREE SERPL-MCNC: 1 NG/DL (ref 0.8–1.7)
TRIGL SERPL-MCNC: 101 MG/DL (ref 30–149)
TSI SER-ACNC: 0.61 MIU/ML (ref 0.36–3.74)
VENTRICULAR RATE: 89 BPM
VLDLC SERPL CALC-MCNC: 17 MG/DL (ref 0–30)
WBC # BLD AUTO: 6.5 X10(3) UL (ref 4–11)

## 2023-03-18 PROCEDURE — 83036 HEMOGLOBIN GLYCOSYLATED A1C: CPT | Performed by: FAMILY MEDICINE

## 2023-03-18 PROCEDURE — 80061 LIPID PANEL: CPT | Performed by: FAMILY MEDICINE

## 2023-03-18 PROCEDURE — 80050 GENERAL HEALTH PANEL: CPT | Performed by: FAMILY MEDICINE

## 2023-03-18 PROCEDURE — 84439 ASSAY OF FREE THYROXINE: CPT | Performed by: FAMILY MEDICINE

## 2023-03-18 RX ORDER — RIVAROXABAN 20 MG/1
20 TABLET, FILM COATED ORAL
Qty: 30 TABLET | Refills: 5 | Status: SHIPPED | OUTPATIENT
Start: 2023-03-18 | End: 2023-04-17

## 2023-03-18 RX ORDER — CYCLOBENZAPRINE HCL 10 MG
10 TABLET ORAL NIGHTLY
Qty: 10 TABLET | Refills: 0 | Status: SHIPPED | OUTPATIENT
Start: 2023-03-18 | End: 2023-03-28

## 2023-03-20 ENCOUNTER — TELEPHONE (OUTPATIENT)
Dept: FAMILY MEDICINE CLINIC | Facility: CLINIC | Age: 63
End: 2023-03-20

## 2023-03-20 ENCOUNTER — LAB ENCOUNTER (OUTPATIENT)
Dept: LAB | Age: 63
End: 2023-03-20
Attending: FAMILY MEDICINE
Payer: COMMERCIAL

## 2023-03-20 DIAGNOSIS — Z01.818 PREOPERATIVE EXAMINATION, UNSPECIFIED: ICD-10-CM

## 2023-03-20 RX ORDER — HYDROCODONE BITARTRATE AND ACETAMINOPHEN 7.5; 325 MG/1; MG/1
1 TABLET ORAL EVERY 4 HOURS PRN
Qty: 30 TABLET | Refills: 0 | Status: SHIPPED | OUTPATIENT
Start: 2023-03-20 | End: 2023-04-19

## 2023-03-20 NOTE — TELEPHONE ENCOUNTER
Pt states she is on eliquis- she started them on Saturday. She wanted to know if she had to go off the eliquis before the MRI- RN spoke to Dr. Yoav Zhou and he advised she should not have to go off the Elquis.     Pt is also in need of her Brinkley refill    Pt also wanted to verify that we had her immunizations up to date- RN confimred

## 2023-03-20 NOTE — TELEPHONE ENCOUNTER
Patient requests a call back regarding procedure she's having done on thurs    Please adv  Thank you

## 2023-03-21 ENCOUNTER — ANESTHESIA EVENT (OUTPATIENT)
Dept: MRI IMAGING | Facility: HOSPITAL | Age: 63
End: 2023-03-21
Payer: COMMERCIAL

## 2023-03-21 LAB — SARS-COV-2 RNA RESP QL NAA+PROBE: NOT DETECTED

## 2023-03-23 ENCOUNTER — HOSPITAL ENCOUNTER (OUTPATIENT)
Dept: MRI IMAGING | Facility: HOSPITAL | Age: 63
Discharge: HOME | End: 2023-03-23
Attending: FAMILY MEDICINE
Payer: COMMERCIAL

## 2023-03-23 ENCOUNTER — ANESTHESIA (OUTPATIENT)
Dept: MRI IMAGING | Facility: HOSPITAL | Age: 63
End: 2023-03-23
Payer: COMMERCIAL

## 2023-03-23 ENCOUNTER — MED REC SCAN ONLY (OUTPATIENT)
Dept: FAMILY MEDICINE CLINIC | Facility: CLINIC | Age: 63
End: 2023-03-23

## 2023-03-23 VITALS
HEART RATE: 85 BPM | WEIGHT: 293 LBS | SYSTOLIC BLOOD PRESSURE: 130 MMHG | BODY MASS INDEX: 48.82 KG/M2 | HEIGHT: 65 IN | OXYGEN SATURATION: 100 % | DIASTOLIC BLOOD PRESSURE: 73 MMHG | RESPIRATION RATE: 18 BRPM | TEMPERATURE: 97 F

## 2023-03-23 DIAGNOSIS — Z01.818 PRE-OP TESTING: Primary | ICD-10-CM

## 2023-03-23 DIAGNOSIS — M51.26 HERNIATED LUMBAR INTERVERTEBRAL DISC: ICD-10-CM

## 2023-03-23 DIAGNOSIS — R20.2 PARESTHESIA OF LEFT LOWER EXTREMITY: ICD-10-CM

## 2023-03-23 DIAGNOSIS — M54.40 CHRONIC LEFT-SIDED LOW BACK PAIN WITH SCIATICA, SCIATICA LATERALITY UNSPECIFIED: ICD-10-CM

## 2023-03-23 DIAGNOSIS — M21.372 LEFT FOOT DROP: ICD-10-CM

## 2023-03-23 DIAGNOSIS — G89.29 CHRONIC LEFT-SIDED LOW BACK PAIN WITH SCIATICA, SCIATICA LATERALITY UNSPECIFIED: ICD-10-CM

## 2023-03-23 LAB
GLUCOSE BLD-MCNC: 101 MG/DL (ref 70–99)
GLUCOSE BLD-MCNC: 93 MG/DL (ref 70–99)

## 2023-03-23 PROCEDURE — 82962 GLUCOSE BLOOD TEST: CPT

## 2023-03-23 PROCEDURE — 72148 MRI LUMBAR SPINE W/O DYE: CPT | Performed by: FAMILY MEDICINE

## 2023-03-23 RX ORDER — NICOTINE POLACRILEX 4 MG
30 LOZENGE BUCCAL
Status: DISCONTINUED | OUTPATIENT
Start: 2023-03-23 | End: 2023-03-25

## 2023-03-23 RX ORDER — NALOXONE HYDROCHLORIDE 0.4 MG/ML
80 INJECTION, SOLUTION INTRAMUSCULAR; INTRAVENOUS; SUBCUTANEOUS AS NEEDED
Status: ACTIVE | OUTPATIENT
Start: 2023-03-23 | End: 2023-03-23

## 2023-03-23 RX ORDER — ACETAMINOPHEN 500 MG
1000 TABLET ORAL ONCE AS NEEDED
Status: ACTIVE | OUTPATIENT
Start: 2023-03-23 | End: 2023-03-23

## 2023-03-23 RX ORDER — HYDROCODONE BITARTRATE AND ACETAMINOPHEN 5; 325 MG/1; MG/1
2 TABLET ORAL ONCE AS NEEDED
Status: DISPENSED | OUTPATIENT
Start: 2023-03-23 | End: 2023-03-23

## 2023-03-23 RX ORDER — NICOTINE POLACRILEX 4 MG
15 LOZENGE BUCCAL
Status: DISCONTINUED | OUTPATIENT
Start: 2023-03-23 | End: 2023-03-25

## 2023-03-23 RX ORDER — SODIUM CHLORIDE, SODIUM LACTATE, POTASSIUM CHLORIDE, CALCIUM CHLORIDE 600; 310; 30; 20 MG/100ML; MG/100ML; MG/100ML; MG/100ML
INJECTION, SOLUTION INTRAVENOUS CONTINUOUS
Status: DISCONTINUED | OUTPATIENT
Start: 2023-03-23 | End: 2023-03-25

## 2023-03-23 RX ORDER — IBUPROFEN 600 MG/1
600 TABLET ORAL ONCE AS NEEDED
Status: ACTIVE | OUTPATIENT
Start: 2023-03-23 | End: 2023-03-23

## 2023-03-23 RX ORDER — LIDOCAINE HYDROCHLORIDE 10 MG/ML
INJECTION, SOLUTION EPIDURAL; INFILTRATION; INTRACAUDAL; PERINEURAL AS NEEDED
Status: DISCONTINUED | OUTPATIENT
Start: 2023-03-23 | End: 2023-03-23 | Stop reason: SURG

## 2023-03-23 RX ORDER — ONDANSETRON 2 MG/ML
4 INJECTION INTRAMUSCULAR; INTRAVENOUS EVERY 6 HOURS PRN
Status: DISCONTINUED | OUTPATIENT
Start: 2023-03-23 | End: 2023-03-25

## 2023-03-23 RX ORDER — MIDAZOLAM HYDROCHLORIDE 1 MG/ML
1 INJECTION INTRAMUSCULAR; INTRAVENOUS EVERY 5 MIN PRN
Status: ACTIVE | OUTPATIENT
Start: 2023-03-23 | End: 2023-03-23

## 2023-03-23 RX ORDER — PROCHLORPERAZINE EDISYLATE 5 MG/ML
5 INJECTION INTRAMUSCULAR; INTRAVENOUS EVERY 8 HOURS PRN
Status: DISCONTINUED | OUTPATIENT
Start: 2023-03-23 | End: 2023-03-25

## 2023-03-23 RX ORDER — ONDANSETRON 2 MG/ML
INJECTION INTRAMUSCULAR; INTRAVENOUS AS NEEDED
Status: DISCONTINUED | OUTPATIENT
Start: 2023-03-23 | End: 2023-03-23 | Stop reason: SURG

## 2023-03-23 RX ORDER — EPHEDRINE SULFATE 50 MG/ML
INJECTION INTRAVENOUS AS NEEDED
Status: DISCONTINUED | OUTPATIENT
Start: 2023-03-23 | End: 2023-03-23 | Stop reason: SURG

## 2023-03-23 RX ORDER — ROCURONIUM BROMIDE 10 MG/ML
INJECTION, SOLUTION INTRAVENOUS AS NEEDED
Status: DISCONTINUED | OUTPATIENT
Start: 2023-03-23 | End: 2023-03-23 | Stop reason: SURG

## 2023-03-23 RX ORDER — DEXTROSE MONOHYDRATE 25 G/50ML
50 INJECTION, SOLUTION INTRAVENOUS
Status: DISCONTINUED | OUTPATIENT
Start: 2023-03-23 | End: 2023-03-25

## 2023-03-23 RX ORDER — HYDROCODONE BITARTRATE AND ACETAMINOPHEN 5; 325 MG/1; MG/1
1 TABLET ORAL ONCE AS NEEDED
Status: ACTIVE | OUTPATIENT
Start: 2023-03-23 | End: 2023-03-23

## 2023-03-23 RX ORDER — GLYCOPYRROLATE 0.2 MG/ML
INJECTION, SOLUTION INTRAMUSCULAR; INTRAVENOUS AS NEEDED
Status: DISCONTINUED | OUTPATIENT
Start: 2023-03-23 | End: 2023-03-23 | Stop reason: SURG

## 2023-03-23 RX ORDER — METOCLOPRAMIDE HYDROCHLORIDE 5 MG/ML
INJECTION INTRAMUSCULAR; INTRAVENOUS AS NEEDED
Status: DISCONTINUED | OUTPATIENT
Start: 2023-03-23 | End: 2023-03-23 | Stop reason: SURG

## 2023-03-23 RX ADMIN — EPHEDRINE SULFATE 10 MG: 50 INJECTION INTRAVENOUS at 14:30:00

## 2023-03-23 RX ADMIN — GLYCOPYRROLATE 0.4 MG: 0.2 INJECTION, SOLUTION INTRAMUSCULAR; INTRAVENOUS at 14:30:00

## 2023-03-23 RX ADMIN — ONDANSETRON 4 MG: 2 INJECTION INTRAMUSCULAR; INTRAVENOUS at 15:20:00

## 2023-03-23 RX ADMIN — LIDOCAINE HYDROCHLORIDE 10 MG: 10 INJECTION, SOLUTION EPIDURAL; INFILTRATION; INTRACAUDAL; PERINEURAL at 14:22:00

## 2023-03-23 RX ADMIN — EPHEDRINE SULFATE 10 MG: 50 INJECTION INTRAVENOUS at 14:38:00

## 2023-03-23 RX ADMIN — EPHEDRINE SULFATE 10 MG: 50 INJECTION INTRAVENOUS at 14:42:00

## 2023-03-23 RX ADMIN — SODIUM CHLORIDE, SODIUM LACTATE, POTASSIUM CHLORIDE, CALCIUM CHLORIDE: 600; 310; 30; 20 INJECTION, SOLUTION INTRAVENOUS at 15:33:00

## 2023-03-23 RX ADMIN — METOCLOPRAMIDE HYDROCHLORIDE 10 MG: 5 INJECTION INTRAMUSCULAR; INTRAVENOUS at 15:20:00

## 2023-03-23 RX ADMIN — ROCURONIUM BROMIDE 30 MG: 10 INJECTION, SOLUTION INTRAVENOUS at 14:26:00

## 2023-03-23 RX ADMIN — EPHEDRINE SULFATE 10 MG: 50 INJECTION INTRAVENOUS at 14:34:00

## 2023-03-23 NOTE — DISCHARGE INSTRUCTIONS
[address] [facility] Department of Radiology  MRI      Arrange for a responsible adult to stay with you  DO NOT drive for 24 hours  DO NOT take public transportation without the presence of responsible adult for 24 hours  Try to rest the day of the procedure  No strenuous activity (heavy lifting) for 48-72 hours)  DO NOT drink alcoholic beverages or take medication not specifically prescribed for 24 hours  Start taking sips of fluids and if tolerated then you can eat small meals the rest of the day  If you have any concerns, PLEASE DO NOT HESITATE to call any of these departments.     Radiology - [phone]  Emergency Room - [phone]  Your own physician

## 2023-03-23 NOTE — ADDENDUM NOTE
Addendum  created 03/23/23 1555 by TaxAriana MD    Order list changed, Pharmacy for encounter modified

## 2023-03-23 NOTE — ANESTHESIA PROCEDURE NOTES
Airway  Date/Time: 3/23/2023 2:26 PM  Urgency: elective    Difficult airway    General Information and Staff    Patient location during procedure: OR  Anesthesiologist: Choco Stuart MD  Performed: anesthesiologist   Performed by: Choco Stuart MD  Authorized by:  Choco Stuart MD      Indications and Patient Condition  Indications for airway management: anesthesia  Spontaneous Ventilation: absent  Sedation level: deep  Preoxygenated: yes  Patient position: sniffing  Mask difficulty assessment: 1 - vent by mask    Final Airway Details  Final airway type: endotracheal airway      Successful airway: ETT  Cuffed: yes   Successful intubation technique: Video laryngoscopy  Facilitating devices/methods: intubating stylet  Endotracheal tube insertion site: oral  Blade: Peter  Blade size: #3  ETT size (mm): 7.0    Cormack-Lehane Classification: grade III - view of epiglottis only  Placement verified by: chest auscultation and capnometry   Measured from: teeth  Number of attempts at approach: 1  Ventilation between attempts: none  Number of other approaches attempted: 0

## 2023-03-23 NOTE — ANESTHESIA POSTPROCEDURE EVALUATION
[facility]    Janet Adorno Patient Status:  Outpatient   Age/Gender 60year old female MRN [id number]   [facility] MRI Attending Germaine Govea, [address] [facility] Day # 0 PCP Bart Etienne DO       Anesthesia Post-op Note        Procedure Summary     Date: 03/23/23 Room / Location: [address] [place] [address]; [facility] MRI; [facility] X-ray    Anesthesia Start: 1418 Anesthesia Stop:     Procedure: MRI SPINE LUMBAR (CPT=72148) Diagnosis:       Chronic left-sided low back pain with sciatica, sciatica laterality unspecified      Paresthesia of left lower extremity      Left foot drop      Herniated lumbar intervertebral disc    Scheduled Providers:  Anesthesiologist: Estephania Stuart MD    Anesthesia Type: general, MAC ASA Status: 3          Anesthesia Type: general, MAC    Vitals Value Taken Time   /72 03/23/23 1535   Temp 97.2 03/23/23 1535   Pulse 88 03/23/23 1535   Resp 20 03/23/23 1535   SpO2 95 03/23/23 1535       Patient Location: PACU    Anesthesia Type: general    Airway Patency: extubated    Postop Pain Control: adequate    Mental Status: mildly sedated but able to meaningfully participate in the post-anesthesia evaluation    Nausea/Vomiting: none    Cardiopulmonary/Hydration status: stable euvolemic    Complications: no apparent anesthesia related complications    Postop vital signs: stable    Dental Exam: Unchanged from Preop    Patient to be discharged from PACU when criteria met.

## 2023-03-24 ENCOUNTER — HOSPITAL ENCOUNTER (OUTPATIENT)
Facility: HOSPITAL | Age: 63
Setting detail: OBSERVATION
Discharge: HOME | End: 2023-03-26
Attending: EMERGENCY MEDICINE | Admitting: HOSPITALIST
Payer: COMMERCIAL

## 2023-03-24 ENCOUNTER — TELEPHONE (OUTPATIENT)
Dept: FAMILY MEDICINE CLINIC | Facility: CLINIC | Age: 63
End: 2023-03-24

## 2023-03-24 DIAGNOSIS — M54.9 INTRACTABLE BACK PAIN: Primary | ICD-10-CM

## 2023-03-24 LAB
ALBUMIN SERPL-MCNC: 3.1 G/DL (ref 3.4–5)
ALBUMIN/GLOB SERPL: 0.8 {RATIO} (ref 1–2)
ALP LIVER SERPL-CCNC: 98 U/L
ALT SERPL-CCNC: 20 U/L
ANION GAP SERPL CALC-SCNC: 7 MMOL/L (ref 0–18)
AST SERPL-CCNC: 16 U/L (ref 15–37)
BASOPHILS # BLD AUTO: 0.02 X10(3) UL (ref 0–0.2)
BASOPHILS NFR BLD AUTO: 0.3 %
BILIRUB SERPL-MCNC: 0.3 MG/DL (ref 0.1–2)
BUN BLD-MCNC: 13 MG/DL (ref 7–18)
CALCIUM BLD-MCNC: 9.3 MG/DL (ref 8.5–10.1)
CHLORIDE SERPL-SCNC: 104 MMOL/L (ref 98–112)
CO2 SERPL-SCNC: 27 MMOL/L (ref 21–32)
CREAT BLD-MCNC: 0.82 MG/DL
EOSINOPHIL # BLD AUTO: 0.2 X10(3) UL (ref 0–0.7)
EOSINOPHIL NFR BLD AUTO: 3 %
ERYTHROCYTE [DISTWIDTH] IN BLOOD BY AUTOMATED COUNT: 15.5 %
GFR SERPLBLD BASED ON 1.73 SQ M-ARVRAT: 80 ML/MIN/1.73M2 (ref 60–?)
GLOBULIN PLAS-MCNC: 4 G/DL (ref 2.8–4.4)
GLUCOSE BLD-MCNC: 98 MG/DL (ref 70–99)
HCT VFR BLD AUTO: 38.6 %
HGB BLD-MCNC: 12.4 G/DL
IMM GRANULOCYTES # BLD AUTO: 0.02 X10(3) UL (ref 0–1)
IMM GRANULOCYTES NFR BLD: 0.3 %
LYMPHOCYTES # BLD AUTO: 1.68 X10(3) UL (ref 1–4)
LYMPHOCYTES NFR BLD AUTO: 25.1 %
MCH RBC QN AUTO: 27.3 PG (ref 26–34)
MCHC RBC AUTO-ENTMCNC: 32.1 G/DL (ref 31–37)
MCV RBC AUTO: 85 FL
MONOCYTES # BLD AUTO: 0.61 X10(3) UL (ref 0.1–1)
MONOCYTES NFR BLD AUTO: 9.1 %
NEUTROPHILS # BLD AUTO: 4.16 X10 (3) UL (ref 1.5–7.7)
NEUTROPHILS # BLD AUTO: 4.16 X10(3) UL (ref 1.5–7.7)
NEUTROPHILS NFR BLD AUTO: 62.2 %
OSMOLALITY SERPL CALC.SUM OF ELEC: 286 MOSM/KG (ref 275–295)
PLATELET # BLD AUTO: 313 10(3)UL (ref 150–450)
POTASSIUM SERPL-SCNC: 4 MMOL/L (ref 3.5–5.1)
PROT SERPL-MCNC: 7.1 G/DL (ref 6.4–8.2)
RBC # BLD AUTO: 4.54 X10(6)UL
SARS-COV-2 RNA RESP QL NAA+PROBE: NOT DETECTED
SODIUM SERPL-SCNC: 138 MMOL/L (ref 136–145)
WBC # BLD AUTO: 6.7 X10(3) UL (ref 4–11)

## 2023-03-24 PROCEDURE — 99223 1ST HOSP IP/OBS HIGH 75: CPT | Performed by: HOSPITALIST

## 2023-03-24 RX ORDER — KETOROLAC TROMETHAMINE 15 MG/ML
15 INJECTION, SOLUTION INTRAMUSCULAR; INTRAVENOUS ONCE
Status: COMPLETED | OUTPATIENT
Start: 2023-03-24 | End: 2023-03-24

## 2023-03-24 RX ORDER — ONDANSETRON 2 MG/ML
4 INJECTION INTRAMUSCULAR; INTRAVENOUS ONCE
Status: COMPLETED | OUTPATIENT
Start: 2023-03-24 | End: 2023-03-24

## 2023-03-24 RX ORDER — MORPHINE SULFATE 4 MG/ML
4 INJECTION, SOLUTION INTRAMUSCULAR; INTRAVENOUS EVERY 30 MIN PRN
Status: DISCONTINUED | OUTPATIENT
Start: 2023-03-24 | End: 2023-03-26

## 2023-03-24 RX ORDER — METHYLPREDNISOLONE SODIUM SUCCINATE 125 MG/2ML
125 INJECTION, POWDER, LYOPHILIZED, FOR SOLUTION INTRAMUSCULAR; INTRAVENOUS ONCE
Status: COMPLETED | OUTPATIENT
Start: 2023-03-24 | End: 2023-03-24

## 2023-03-24 NOTE — TELEPHONE ENCOUNTER
Tried to call the pt to check on her pain- phone went directly to voicemail    Per Dr. Velma Nugent if pain is bad enough can try to direct admit her for treatment

## 2023-03-24 NOTE — TELEPHONE ENCOUNTER
Pt called back and states that she is agreeable to being admitted- she states that she sent a mychart    I apologized but that we did not see the mychart message btu that we have been trying to call her today- she states that her phone was right by her but it did not ring.'    Advised that I will discuss with Dr. Katrin Ge and get back to her  She v/u    Notes from Dr. Polo Ormond-   So here's what you're Kevin Valverde do you're Kevin Valverde call her back and tell her to go through the emergency room. I will call ahead and let them know when she gets there.  She is to tell them that her pain is excruciating that she had a recent MRI and for them to know that she has not been able to get up unassisted and then she's having bladder issues- advised the pt and she states that she is doing laundry because she has increase incontinence today      Pt notified and ER is aware of her coming

## 2023-03-25 ENCOUNTER — TELEPHONE (OUTPATIENT)
Dept: FAMILY MEDICINE CLINIC | Facility: CLINIC | Age: 63
End: 2023-03-25

## 2023-03-25 ENCOUNTER — APPOINTMENT (OUTPATIENT)
Dept: GENERAL RADIOLOGY | Facility: HOSPITAL | Age: 63
End: 2023-03-25
Attending: NEUROLOGICAL SURGERY
Payer: COMMERCIAL

## 2023-03-25 LAB
BILIRUB UR QL STRIP.AUTO: NEGATIVE
COLOR UR AUTO: YELLOW
GLUCOSE BLD-MCNC: 119 MG/DL (ref 70–99)
GLUCOSE BLD-MCNC: 151 MG/DL (ref 70–99)
GLUCOSE BLD-MCNC: 161 MG/DL (ref 70–99)
GLUCOSE BLD-MCNC: 180 MG/DL (ref 70–99)
GLUCOSE BLD-MCNC: 212 MG/DL (ref 70–99)
GLUCOSE UR STRIP.AUTO-MCNC: NEGATIVE MG/DL
INR BLD: 1.16 (ref 0.85–1.16)
NITRITE UR QL STRIP.AUTO: NEGATIVE
PH UR STRIP.AUTO: 5 [PH] (ref 5–8)
PROT UR STRIP.AUTO-MCNC: 30 MG/DL
PROTHROMBIN TIME: 14.8 SECONDS (ref 11.6–14.8)
RBC UR QL AUTO: NEGATIVE
SP GR UR STRIP.AUTO: >1.03 (ref 1–1.03)
UROBILINOGEN UR STRIP.AUTO-MCNC: <2 MG/DL

## 2023-03-25 PROCEDURE — 72082 X-RAY EXAM ENTIRE SPI 2/3 VW: CPT | Performed by: NEUROLOGICAL SURGERY

## 2023-03-25 PROCEDURE — 99232 SBSQ HOSP IP/OBS MODERATE 35: CPT | Performed by: HOSPITALIST

## 2023-03-25 PROCEDURE — 99222 1ST HOSP IP/OBS MODERATE 55: CPT | Performed by: NEUROLOGICAL SURGERY

## 2023-03-25 RX ORDER — VENLAFAXINE HYDROCHLORIDE 75 MG/1
150 CAPSULE, EXTENDED RELEASE ORAL 2 TIMES DAILY
Status: DISCONTINUED | OUTPATIENT
Start: 2023-03-25 | End: 2023-03-26

## 2023-03-25 RX ORDER — ONDANSETRON 2 MG/ML
4 INJECTION INTRAMUSCULAR; INTRAVENOUS EVERY 6 HOURS PRN
Status: DISCONTINUED | OUTPATIENT
Start: 2023-03-25 | End: 2023-03-26

## 2023-03-25 RX ORDER — PROCHLORPERAZINE EDISYLATE 5 MG/ML
5 INJECTION INTRAMUSCULAR; INTRAVENOUS EVERY 8 HOURS PRN
Status: DISCONTINUED | OUTPATIENT
Start: 2023-03-25 | End: 2023-03-26

## 2023-03-25 RX ORDER — MELATONIN
3 NIGHTLY PRN
Status: DISCONTINUED | OUTPATIENT
Start: 2023-03-25 | End: 2023-03-26

## 2023-03-25 RX ORDER — HYDROCODONE BITARTRATE AND ACETAMINOPHEN 10; 325 MG/1; MG/1
1 TABLET ORAL EVERY 6 HOURS PRN
Qty: 20 TABLET | Refills: 0 | Status: SHIPPED | OUTPATIENT
Start: 2023-03-25 | End: 2023-03-25

## 2023-03-25 RX ORDER — HYDROCODONE BITARTRATE AND ACETAMINOPHEN 10; 325 MG/1; MG/1
1 TABLET ORAL EVERY 6 HOURS PRN
Qty: 20 TABLET | Refills: 0 | Status: SHIPPED | OUTPATIENT
Start: 2023-03-25

## 2023-03-25 RX ORDER — KETOROLAC TROMETHAMINE 30 MG/ML
30 INJECTION, SOLUTION INTRAMUSCULAR; INTRAVENOUS EVERY 6 HOURS
Status: DISCONTINUED | OUTPATIENT
Start: 2023-03-25 | End: 2023-03-26

## 2023-03-25 RX ORDER — ATORVASTATIN CALCIUM 20 MG/1
20 TABLET, FILM COATED ORAL NIGHTLY
Status: DISCONTINUED | OUTPATIENT
Start: 2023-03-25 | End: 2023-03-26

## 2023-03-25 RX ORDER — HYDROCODONE BITARTRATE AND ACETAMINOPHEN 10; 325 MG/1; MG/1
1 TABLET ORAL EVERY 4 HOURS PRN
Status: DISCONTINUED | OUTPATIENT
Start: 2023-03-25 | End: 2023-03-26

## 2023-03-25 RX ORDER — POLYETHYLENE GLYCOL 3350 17 G/17G
17 POWDER, FOR SOLUTION ORAL DAILY PRN
Status: DISCONTINUED | OUTPATIENT
Start: 2023-03-25 | End: 2023-03-26

## 2023-03-25 RX ORDER — DEXAMETHASONE SODIUM PHOSPHATE 4 MG/ML
4 VIAL (ML) INJECTION EVERY 6 HOURS
Status: DISCONTINUED | OUTPATIENT
Start: 2023-03-25 | End: 2023-03-26

## 2023-03-25 RX ORDER — MORPHINE SULFATE 2 MG/ML
1 INJECTION, SOLUTION INTRAMUSCULAR; INTRAVENOUS EVERY 2 HOUR PRN
Status: DISCONTINUED | OUTPATIENT
Start: 2023-03-25 | End: 2023-03-26

## 2023-03-25 RX ORDER — SENNOSIDES 8.6 MG
17.2 TABLET ORAL NIGHTLY PRN
Status: DISCONTINUED | OUTPATIENT
Start: 2023-03-25 | End: 2023-03-26

## 2023-03-25 RX ORDER — DEXTROSE MONOHYDRATE 25 G/50ML
50 INJECTION, SOLUTION INTRAVENOUS
Status: DISCONTINUED | OUTPATIENT
Start: 2023-03-25 | End: 2023-03-26

## 2023-03-25 RX ORDER — ALBUTEROL SULFATE 2.5 MG/3ML
2.5 SOLUTION RESPIRATORY (INHALATION) 4 TIMES DAILY PRN
Status: DISCONTINUED | OUTPATIENT
Start: 2023-03-25 | End: 2023-03-26

## 2023-03-25 RX ORDER — METHYLPREDNISOLONE 4 MG/1
TABLET ORAL
Qty: 21 EACH | Refills: 0 | Status: SHIPPED | OUTPATIENT
Start: 2023-03-25 | End: 2023-03-25

## 2023-03-25 RX ORDER — SODIUM PHOSPHATE, DIBASIC AND SODIUM PHOSPHATE, MONOBASIC 7; 19 G/133ML; G/133ML
1 ENEMA RECTAL ONCE AS NEEDED
Status: DISCONTINUED | OUTPATIENT
Start: 2023-03-25 | End: 2023-03-26

## 2023-03-25 RX ORDER — NICOTINE POLACRILEX 4 MG
15 LOZENGE BUCCAL
Status: DISCONTINUED | OUTPATIENT
Start: 2023-03-25 | End: 2023-03-26

## 2023-03-25 RX ORDER — ACETAMINOPHEN 500 MG
500 TABLET ORAL EVERY 4 HOURS PRN
Status: DISCONTINUED | OUTPATIENT
Start: 2023-03-25 | End: 2023-03-26

## 2023-03-25 RX ORDER — OXCARBAZEPINE 150 MG/1
150 TABLET, FILM COATED ORAL 2 TIMES DAILY
Status: DISCONTINUED | OUTPATIENT
Start: 2023-03-25 | End: 2023-03-26

## 2023-03-25 RX ORDER — GABAPENTIN 100 MG/1
200 CAPSULE ORAL 3 TIMES DAILY
Status: DISCONTINUED | OUTPATIENT
Start: 2023-03-25 | End: 2023-03-26

## 2023-03-25 RX ORDER — BISACODYL 10 MG
10 SUPPOSITORY, RECTAL RECTAL
Status: DISCONTINUED | OUTPATIENT
Start: 2023-03-25 | End: 2023-03-26

## 2023-03-25 RX ORDER — MORPHINE SULFATE 2 MG/ML
2 INJECTION, SOLUTION INTRAMUSCULAR; INTRAVENOUS EVERY 2 HOUR PRN
Status: DISCONTINUED | OUTPATIENT
Start: 2023-03-25 | End: 2023-03-26

## 2023-03-25 RX ORDER — ALPRAZOLAM 0.25 MG/1
0.25 TABLET ORAL 2 TIMES DAILY PRN
Status: DISCONTINUED | OUTPATIENT
Start: 2023-03-25 | End: 2023-03-26

## 2023-03-25 RX ORDER — METOPROLOL SUCCINATE 25 MG/1
25 TABLET, EXTENDED RELEASE ORAL
Status: DISCONTINUED | OUTPATIENT
Start: 2023-03-25 | End: 2023-03-26

## 2023-03-25 RX ORDER — LAMOTRIGINE 100 MG/1
100 TABLET ORAL 3 TIMES DAILY
Status: DISCONTINUED | OUTPATIENT
Start: 2023-03-25 | End: 2023-03-26

## 2023-03-25 RX ORDER — MORPHINE SULFATE 4 MG/ML
4 INJECTION, SOLUTION INTRAMUSCULAR; INTRAVENOUS EVERY 2 HOUR PRN
Status: DISCONTINUED | OUTPATIENT
Start: 2023-03-25 | End: 2023-03-26

## 2023-03-25 RX ORDER — TIZANIDINE 2 MG/1
2 TABLET ORAL EVERY 6 HOURS PRN
Status: DISCONTINUED | OUTPATIENT
Start: 2023-03-25 | End: 2023-03-26

## 2023-03-25 RX ORDER — TIZANIDINE 2 MG/1
2 TABLET ORAL EVERY 6 HOURS PRN
Qty: 30 TABLET | Refills: 0 | Status: SHIPPED | OUTPATIENT
Start: 2023-03-25

## 2023-03-25 RX ORDER — TIZANIDINE 2 MG/1
2 TABLET ORAL EVERY 6 HOURS PRN
Qty: 30 TABLET | Refills: 0 | Status: SHIPPED | OUTPATIENT
Start: 2023-03-25 | End: 2023-03-25

## 2023-03-25 RX ORDER — NICOTINE POLACRILEX 4 MG
30 LOZENGE BUCCAL
Status: DISCONTINUED | OUTPATIENT
Start: 2023-03-25 | End: 2023-03-26

## 2023-03-25 RX ORDER — METHYLPREDNISOLONE 4 MG/1
TABLET ORAL
Qty: 21 EACH | Refills: 0 | Status: SHIPPED | OUTPATIENT
Start: 2023-03-25

## 2023-03-25 NOTE — TELEPHONE ENCOUNTER
RN Spoke with pt - she is still inpatient    She is on steroids, pain medication and a muscle relaxer and she is starting to feel a little better    She is very overwhelmed and she thinks she is seeing dee? They said that she has scoliosis on the bottom of her spinal column? She states she had no idea that she had that. Pt is very concerned that she won't be able to to have surgery since it will be such a long surgery and she has to get her inflammation under control? She wants DS to look at her imaging and her xrays? She was told that maybe she would be able to get a steroid shot to help with inflammation and get it under control so she could possibly have surgery?     Routing to provider- please see notes from hospital

## 2023-03-25 NOTE — CM/SW NOTE
03/25/23 1400   CM/SW Referral Data   Referral Source Social Work (self-referral)   Reason for Referral Discharge [id number] [facility] Staff Member;EMR   Discharge Needs   Anticipated D/C needs Home health care     HOME SITUATION  Type of Home: House   Home Layout: One level  Stairs to Enter : 2  Railing: Yes  Stairs to Bedroom: 0     Lives With: Alone  Drives: Yes  Patient Owned Equipment: Rolling walker;Cane  Patient Regularly Uses: Glasses     Prior Level of [place] per PT eval: Prior to 1 week ago, pt was I with ADL, and not using an assistive device. Pt has not been driving for approx 1 month. The neighbors granddaughter has been assisting pt with household tasks and occasional ADL. SW noted PT recommendation for [facility]. [facility] referrals sent in [address] [place] [address], choice list will be provided once available. SW will continue to follow.     LENA Thomas  Discharge Planner

## 2023-03-25 NOTE — PLAN OF CARE
NURSING ADMISSION NOTE      Patient admitted via cart from ED. Up to [facility] voiding without difficulty. C/o pain shooting down buttocks to LLE. Medicated with Morphine prn. IV steroids per orders. Pt on RA. /Tele. Oriented to room. Safety precautions initiated. Bed in low position. Call light in reach.

## 2023-03-25 NOTE — TELEPHONE ENCOUNTER
Patient called requesting to  Dr. Yoav Zhou to look at all the test she's had in the ER(Labs, x-rays and MRI) . Patient worried about test results and is requesting Dr Yoav Zhou to call her back.     @[phone]

## 2023-03-25 NOTE — HOME CARE LIAISON
Discussed with patient list of [place] [facility] providers from [facility], patient choice is [facility]. Agency reserved in [facility] and contact information placed on AVS. Financial interest disclosure provided to patient. DYLLAN Ac updated.

## 2023-03-25 NOTE — ED INITIAL ASSESSMENT (HPI)
Pt presents to ED stating she was told by her PCP to come to ED for direct admit for a neuro consult based on MRI results from MRI yesterday. Pt states she has been having bladder control issues for past 1.5 weeks)  Took home norco this afternoon, and not getting relief from home pain medications.

## 2023-03-25 NOTE — ED QUICK NOTES
Orders for admission, patient is aware of plan and ready to go upstairs. Any questions, please call ED RN Deneen Boas at extension [phone].      Patient Covid vaccination status: Fully vaccinated     COVID Test Ordered in ED: Rapid SARS-CoV-2 by PCR    COVID Suspicion at Admission: Low clinical suspicion for COVID    Running Infusions:  None    Mental Status/LOC at time of transport: A&O x4    Other pertinent information:   CIWA score: N/A   NIH score:  N/A

## 2023-03-25 NOTE — DISCHARGE INSTRUCTIONS
Sometimes managing your health at home requires assistance. The [facility] team has recognized your preference to use [facility]. They can be reached by phone at [phone]. The fax number for your reference is [phone]. A representative from the home health agency will contact you or your family to schedule your first visit.

## 2023-03-25 NOTE — CM/SW NOTE
Notified by [facility] liaison pt is agreeable to [facility].

## 2023-03-26 VITALS
TEMPERATURE: 97 F | OXYGEN SATURATION: 96 % | BODY MASS INDEX: 53 KG/M2 | RESPIRATION RATE: 18 BRPM | SYSTOLIC BLOOD PRESSURE: 135 MMHG | HEART RATE: 76 BPM | WEIGHT: 293 LBS | DIASTOLIC BLOOD PRESSURE: 86 MMHG

## 2023-03-26 NOTE — PLAN OF CARE
Pt received up to chair. Reports feeling better today, back pain has decreased. Has moderate pain currently but tolerable and denies any need for pain medications at this time. Pt to be discharged home this evening. Son has her keys, reports son is currently out in [facility], and will come pick her around midnight.  Plan for discharge tonight

## 2023-03-26 NOTE — PLAN OF CARE
Pt discharged home, picked up by son. Discharge instructions given and reviewed. Pt indicated understanding.

## 2023-03-28 ENCOUNTER — PATIENT OUTREACH (OUTPATIENT)
Dept: CASE MANAGEMENT | Age: 63
End: 2023-03-28

## 2023-04-03 ENCOUNTER — TELEPHONE (OUTPATIENT)
Dept: FAMILY MEDICINE CLINIC | Facility: CLINIC | Age: 63
End: 2023-04-03

## 2023-04-04 RX ORDER — HYDROCODONE BITARTRATE AND ACETAMINOPHEN 10; 325 MG/1; MG/1
1 TABLET ORAL EVERY 6 HOURS PRN
Qty: 20 TABLET | Refills: 0 | Status: SHIPPED | OUTPATIENT
Start: 2023-04-04 | End: 2023-04-10

## 2023-04-04 NOTE — TELEPHONE ENCOUNTER
Pt returned call today- can you please review your Er visit from over the weekend? Pt states that over the weekend she went to the ER    Pt states that when she was in the hospital she was started on Tizanidine 2mg  Every 6 hrs as needed. She states that she has been on the phone with pain clinic and they won't give her an injection until she comes in for a consult on the 10th? Pt states she has been taking Norco and Tizanidine- she is out of [place] can DS refill? Pt also states she was given a script for valium- is that something she should start taking? Would it help her- she doesn't know if she is having spasms? Or would it be to much with all her other medications.     Future Appointments   Date Time Provider Jennifer Stanley   4/10/2023  2:30 PM Valerie Ford APRN [facility]

## 2023-04-10 ENCOUNTER — OFFICE VISIT (OUTPATIENT)
Dept: PAIN CLINIC | Facility: CLINIC | Age: 63
End: 2023-04-10
Payer: COMMERCIAL

## 2023-04-10 VITALS
DIASTOLIC BLOOD PRESSURE: 84 MMHG | WEIGHT: 293 LBS | OXYGEN SATURATION: 98 % | HEART RATE: 86 BPM | SYSTOLIC BLOOD PRESSURE: 128 MMHG | BODY MASS INDEX: 53 KG/M2

## 2023-04-10 DIAGNOSIS — M54.9 INTRACTABLE BACK PAIN: ICD-10-CM

## 2023-04-10 DIAGNOSIS — M54.16 LUMBAR RADICULOPATHY: Primary | ICD-10-CM

## 2023-04-10 DIAGNOSIS — E66.01 MORBID OBESITY WITH BMI OF 50.0-59.9, ADULT (HCC): ICD-10-CM

## 2023-04-10 PROCEDURE — 99205 OFFICE O/P NEW HI 60 MIN: CPT | Performed by: ANESTHESIOLOGY

## 2023-04-10 PROCEDURE — 3079F DIAST BP 80-89 MM HG: CPT | Performed by: ANESTHESIOLOGY

## 2023-04-10 PROCEDURE — 3074F SYST BP LT 130 MM HG: CPT | Performed by: ANESTHESIOLOGY

## 2023-04-10 RX ORDER — DIAZEPAM 5 MG/1
5 TABLET ORAL
COMMUNITY
Start: 2023-04-02

## 2023-04-10 RX ORDER — HYDROCODONE BITARTRATE AND ACETAMINOPHEN 10; 325 MG/1; MG/1
1 TABLET ORAL EVERY 6 HOURS PRN
Qty: 20 TABLET | Refills: 0 | Status: SHIPPED | OUTPATIENT
Start: 2023-04-10

## 2023-04-13 ENCOUNTER — HOSPITAL ENCOUNTER (EMERGENCY)
Facility: HOSPITAL | Age: 63
Discharge: HOME | End: 2023-04-14
Attending: EMERGENCY MEDICINE
Payer: COMMERCIAL

## 2023-04-13 ENCOUNTER — APPOINTMENT (OUTPATIENT)
Dept: ULTRASOUND IMAGING | Facility: HOSPITAL | Age: 63
End: 2023-04-13
Attending: EMERGENCY MEDICINE
Payer: COMMERCIAL

## 2023-04-13 ENCOUNTER — TELEPHONE (OUTPATIENT)
Dept: FAMILY MEDICINE CLINIC | Facility: CLINIC | Age: 63
End: 2023-04-13

## 2023-04-13 ENCOUNTER — TELEPHONE (OUTPATIENT)
Dept: PAIN CLINIC | Facility: CLINIC | Age: 63
End: 2023-04-13

## 2023-04-13 DIAGNOSIS — M54.9 EXACERBATION OF CHRONIC BACK PAIN: Primary | ICD-10-CM

## 2023-04-13 DIAGNOSIS — G89.29 EXACERBATION OF CHRONIC BACK PAIN: Primary | ICD-10-CM

## 2023-04-13 PROCEDURE — 99285 EMERGENCY DEPT VISIT HI MDM: CPT

## 2023-04-13 PROCEDURE — 93970 EXTREMITY STUDY: CPT | Performed by: EMERGENCY MEDICINE

## 2023-04-13 PROCEDURE — 96372 THER/PROPH/DIAG INJ SC/IM: CPT

## 2023-04-13 RX ORDER — DEXAMETHASONE SODIUM PHOSPHATE 10 MG/ML
10 INJECTION, SOLUTION INTRAMUSCULAR; INTRAVENOUS ONCE
Status: COMPLETED | OUTPATIENT
Start: 2023-04-13 | End: 2023-04-13

## 2023-04-13 RX ORDER — METHYLPREDNISOLONE 4 MG/1
TABLET ORAL
Qty: 1 EACH | Refills: 0 | Status: SHIPPED | OUTPATIENT
Start: 2023-04-13

## 2023-04-13 RX ORDER — OXYCODONE HYDROCHLORIDE AND ACETAMINOPHEN 5; 325 MG/1; MG/1
1-2 TABLET ORAL EVERY 6 HOURS PRN
Qty: 15 TABLET | Refills: 0 | Status: SHIPPED | OUTPATIENT
Start: 2023-04-13 | End: 2023-04-18

## 2023-04-13 RX ORDER — HYDROMORPHONE HYDROCHLORIDE 1 MG/ML
1 INJECTION, SOLUTION INTRAMUSCULAR; INTRAVENOUS; SUBCUTANEOUS ONCE
Status: COMPLETED | OUTPATIENT
Start: 2023-04-13 | End: 2023-04-13

## 2023-04-13 NOTE — TELEPHONE ENCOUNTER
Received a call from pt. Pt called the direct nurse line. Per pt she saw  on 4/10/2023 due to her back pain with sciatica pain rated her pain 12/10. pt is crying and states that can't do anything right it's been 55 days and she's still not doing well,she can't even walk to the washroom,pt states that she can only walk 5 steps and she ended up peeing all over. 'Pt also claims leg swelling Advised pt that when she saw Michael Beckett in the office an injection was discussed with her,per pt she has not received any call to schedule yet,advised pt that it is still pending and waiting for the approval from her insurance. Pt Vu. Pt state sthat she's been taking the Norco but still she's in pain,per pt she was advised by her pharmacist that she can take extra tylenol as norco only has 325 my of Tylenol on it. Advised pt if the she's still having so much pain even with [place],advised pt that she can go to ED to get evaluated . Per pt she will wait for now and see if her pain gets better ,per pt she called Ed for waiting time and she was told [facility] is not busy at this moment. advised pt that will reach out to  for feedback. Pt Biju and appreciative for listening to her.

## 2023-04-13 NOTE — TELEPHONE ENCOUNTER
Pt states she is having constant pain in her feet- it resolves a little bit and then it gets worse. Pt states it feels like nerve pain and she is having swelling. Pt states she is feeling like her feet and legs are tight. Pt states she is keeping feet elevated when she is laying in bed. Pt denies diuretic use right now- only new med is the valium. Pt states last night her back pain felt worse- it felt like it was clenching because the area feels so swollen. Pt states it has been 57 days of this pain and she is burnt out and she is frustrated    Pt states she has a bottle of bumex at home- should she take diuretic? RN advised she needs to go to ER to be seen and evaluated to see if she needs imaging. She does not need to take a diuretic until we know the underlying cause of this issue. Pt was advised that she needs to go to ER - RN advise [address] would be the best option.     Pt verbalized understanding

## 2023-04-13 NOTE — TELEPHONE ENCOUNTER
PT CALLED AND ADV THAT FEET ARE VERY SWOLLEN AND IT LOTS OF PAIN.     LOOKING FOR RECOMMENDATIONS AND WOULD PREFER THIS AM - SON IS ABLE TO DRIVE      PLEASE CALL AND ADV    THANK YOU

## 2023-04-13 NOTE — TELEPHONE ENCOUNTER
Started PA via [facility] for Left L4,L5 TLESI ([id number]), clinical information faxed to [facility] at Fax #: [phone];UMER yousif    Order ID: [id number]

## 2023-04-13 NOTE — ED INITIAL ASSESSMENT (HPI)
PT reports history of sciatica, ongoing for 55 days. PT seen PCP around the time the pain started, and was prescribed steroids and pain medication which helped. PT also has seen a pain specialist after the course of medications. PT reports pain has now gotten worse. She states she has been doing longer road trips the past few months.

## 2023-04-14 VITALS
TEMPERATURE: 98 F | DIASTOLIC BLOOD PRESSURE: 72 MMHG | BODY MASS INDEX: 48.82 KG/M2 | WEIGHT: 293 LBS | HEART RATE: 83 BPM | HEIGHT: 65 IN | SYSTOLIC BLOOD PRESSURE: 151 MMHG | OXYGEN SATURATION: 97 % | RESPIRATION RATE: 18 BRPM

## 2023-04-14 RX ORDER — ACETAMINOPHEN 500 MG
1000 TABLET ORAL ONCE
Status: COMPLETED | OUTPATIENT
Start: 2023-04-14 | End: 2023-04-14

## 2023-04-14 NOTE — TELEPHONE ENCOUNTER
Contacted  [facility] for P2P scheduled on 4/19/2023 @1:15 pm  ID #: [id number]  Procedure :Left L4,L5 TLESI   MD:

## 2023-04-14 NOTE — TELEPHONE ENCOUNTER
Received Denial Letter from [facility]     Left L4,L5 TLESI ([id number]) Denied     Denial Reason :     Your doctor wants to give you an injection into your lower back. This is to help with the pain or tingling going down your leg from an inflamed nerve in your lower back. This injection is needed when you do not get better after at least four weeks of treatment by your doctor. You need to have two or more types of treatment. This should include medications or therapy that does not involve medicine. It must include special exercises that help make muscles stronger. This could be physical therapy. It could also be an exercise program you did at home. If you did a home exercise program, your doctor needs to supervise it. Your doctor needs to tell us that he gave you initial active instructions. He needs to tell us you participated in the home exercise program. He needs to tell us you were compliant with the program. We reviewed the notes we have. The notes do not show that you had physical therapy. The notes do not show a home exercise program was supervised. As a result, this injection is not medically necessary. We used [employer] Management Clinical Guideline titled Interventional Pain Management, Epidural Injection Procedures and Diagnostic Selective Nerve Root Blocks to make this decision.      Provider has option to contact [facility] for P2P at [phone], provide Member ID #: [id number]

## 2023-04-14 NOTE — TELEPHONE ENCOUNTER
Received denial fax from [facility] regarding pt's PA for injections. Placed in navigator's bin for provider review.

## 2023-04-17 ENCOUNTER — TELEPHONE (OUTPATIENT)
Dept: FAMILY MEDICINE CLINIC | Facility: CLINIC | Age: 63
End: 2023-04-17

## 2023-04-17 NOTE — TELEPHONE ENCOUNTER
RN spoke to pt today regarding her Recent ER visit    Pt was in ER on Thursday-  Pt was started on medrol dose pack and oxycodone through ER    Pt states she is just waiting to see if the pain management group has received information regarding injections? Pt states she has not received more information yet. Pt states she was sent home on medrol dose pack and oxycodone and she states she has had good relief. Pt states she only has 3 pills left of the oxycodone left- she has been taking 1.5 tabs every 6 hours. Pt also states the pain is bearable and she has been able to walk.     She is going to be out of pain medication over night as well as diazepam- she states that she is concerned that if she does not get some medication she is going to wake up and in pain tomorrow and will be back in the ER    Pt is taking diazepam every 6 hours    Oxycodone she is taking 1 or 1.5 tabs every 6 hours and she has also taking a tylenol ES as well    Geovanni 47/34

## 2023-04-18 RX ORDER — OXYCODONE HYDROCHLORIDE AND ACETAMINOPHEN 5; 325 MG/1; MG/1
1-2 TABLET ORAL EVERY 6 HOURS PRN
Qty: 45 TABLET | Refills: 0 | Status: SHIPPED | OUTPATIENT
Start: 2023-04-18 | End: 2023-05-18

## 2023-04-18 RX ORDER — DIAZEPAM 5 MG/1
5 TABLET ORAL EVERY 6 HOURS PRN
Qty: 30 TABLET | Refills: 0 | Status: SHIPPED | OUTPATIENT
Start: 2023-04-18 | End: 2023-05-18

## 2023-04-18 NOTE — TELEPHONE ENCOUNTER
PATIENT CALLING. SHE SAYS SHE WAS TALKING TO DR BOURGEOIS NURSE TODAY. PATIENT VERY EMOTIONAL AND ASKING FOR NURSE.

## 2023-04-18 NOTE — TELEPHONE ENCOUNTER
Dr. Cm Wilson filled pain medication this morning and pt was advised of this information    Pt was also advised that Pain specialist has PTP scheduled for tomorrow regarding the procedure she would like done. She was advised that she needs to wait to to hear from them before scheduling. Just because there is a PTP does not mean anything is approved- it just means that someone is working on this.     Pt verbalized understanding and was grateful for the call back

## 2023-04-19 ENCOUNTER — TELEPHONE (OUTPATIENT)
Dept: FAMILY MEDICINE CLINIC | Facility: CLINIC | Age: 63
End: 2023-04-19

## 2023-04-19 NOTE — TELEPHONE ENCOUNTER
PT CALLED AND WOULD LIKE A CALL BACK - PT ADV RECEIVED CALL AND HAS BEEN APPROVED FOR PAIN INJECTIONS    PLEASE ADV    THANK YOU

## 2023-04-19 NOTE — TELEPHONE ENCOUNTER
Pt was advised that she was approved for injections- we are supposed to be getting a fax regarding the eliquis for 3- 4 and if she can come off the meds for the injections    Dr. Corrina De Oliveira has received the fax and will be reviewing it- pt states they can get her scheduled once they know about the eliquis    Verbalized understanding

## 2023-04-20 ENCOUNTER — APPOINTMENT (OUTPATIENT)
Dept: GENERAL RADIOLOGY | Facility: HOSPITAL | Age: 63
End: 2023-04-20
Attending: ANESTHESIOLOGY
Payer: COMMERCIAL

## 2023-04-20 ENCOUNTER — HOSPITAL ENCOUNTER (OUTPATIENT)
Facility: HOSPITAL | Age: 63
Setting detail: HOSPITAL OUTPATIENT SURGERY
Discharge: HOME | End: 2023-04-20
Attending: ANESTHESIOLOGY | Admitting: ANESTHESIOLOGY
Payer: COMMERCIAL

## 2023-04-20 VITALS
RESPIRATION RATE: 16 BRPM | TEMPERATURE: 98 F | SYSTOLIC BLOOD PRESSURE: 108 MMHG | HEART RATE: 64 BPM | OXYGEN SATURATION: 97 % | DIASTOLIC BLOOD PRESSURE: 59 MMHG

## 2023-04-20 LAB — GLUCOSE BLD-MCNC: 89 MG/DL (ref 70–99)

## 2023-04-20 PROCEDURE — 3E0R3BZ INTRODUCTION OF ANESTHETIC AGENT INTO SPINAL CANAL, PERCUTANEOUS APPROACH: ICD-10-PCS | Performed by: ANESTHESIOLOGY

## 2023-04-20 PROCEDURE — 82962 GLUCOSE BLOOD TEST: CPT

## 2023-04-20 PROCEDURE — 99152 MOD SED SAME PHYS/QHP 5/>YRS: CPT | Performed by: ANESTHESIOLOGY

## 2023-04-20 PROCEDURE — 3E0R33Z INTRODUCTION OF ANTI-INFLAMMATORY INTO SPINAL CANAL, PERCUTANEOUS APPROACH: ICD-10-PCS | Performed by: ANESTHESIOLOGY

## 2023-04-20 RX ORDER — ONDANSETRON 2 MG/ML
4 INJECTION INTRAMUSCULAR; INTRAVENOUS ONCE AS NEEDED
OUTPATIENT
Start: 2023-04-20 | End: 2023-04-20

## 2023-04-20 RX ORDER — NICOTINE POLACRILEX 4 MG
30 LOZENGE BUCCAL
Status: DISCONTINUED | OUTPATIENT
Start: 2023-04-20 | End: 2023-04-20

## 2023-04-20 RX ORDER — NICOTINE POLACRILEX 4 MG
15 LOZENGE BUCCAL
OUTPATIENT
Start: 2023-04-20

## 2023-04-20 RX ORDER — MIDAZOLAM HYDROCHLORIDE 1 MG/ML
INJECTION INTRAMUSCULAR; INTRAVENOUS
Status: DISCONTINUED | OUTPATIENT
Start: 2023-04-20 | End: 2023-04-20

## 2023-04-20 RX ORDER — ONDANSETRON 2 MG/ML
4 INJECTION INTRAMUSCULAR; INTRAVENOUS ONCE AS NEEDED
Status: DISCONTINUED | OUTPATIENT
Start: 2023-04-20 | End: 2023-04-20

## 2023-04-20 RX ORDER — NICOTINE POLACRILEX 4 MG
30 LOZENGE BUCCAL
OUTPATIENT
Start: 2023-04-20

## 2023-04-20 RX ORDER — INSULIN ASPART 100 [IU]/ML
3 INJECTION, SOLUTION INTRAVENOUS; SUBCUTANEOUS ONCE
OUTPATIENT
Start: 2023-04-20 | End: 2023-04-20

## 2023-04-20 RX ORDER — SODIUM CHLORIDE 9 MG/ML
INJECTION INTRAVENOUS
Status: DISCONTINUED | OUTPATIENT
Start: 2023-04-20 | End: 2023-04-20

## 2023-04-20 RX ORDER — DEXAMETHASONE SODIUM PHOSPHATE 10 MG/ML
INJECTION, SOLUTION INTRAMUSCULAR; INTRAVENOUS
Status: DISCONTINUED | OUTPATIENT
Start: 2023-04-20 | End: 2023-04-20

## 2023-04-20 RX ORDER — DEXTROSE MONOHYDRATE 25 G/50ML
50 INJECTION, SOLUTION INTRAVENOUS
OUTPATIENT
Start: 2023-04-20

## 2023-04-20 RX ORDER — INSULIN ASPART 100 [IU]/ML
3 INJECTION, SOLUTION INTRAVENOUS; SUBCUTANEOUS ONCE
Status: DISCONTINUED | OUTPATIENT
Start: 2023-04-20 | End: 2023-04-20

## 2023-04-20 RX ORDER — DEXTROSE MONOHYDRATE 25 G/50ML
50 INJECTION, SOLUTION INTRAVENOUS
Status: DISCONTINUED | OUTPATIENT
Start: 2023-04-20 | End: 2023-04-20

## 2023-04-20 RX ORDER — SODIUM CHLORIDE, SODIUM LACTATE, POTASSIUM CHLORIDE, CALCIUM CHLORIDE 600; 310; 30; 20 MG/100ML; MG/100ML; MG/100ML; MG/100ML
100 INJECTION, SOLUTION INTRAVENOUS CONTINUOUS
OUTPATIENT
Start: 2023-04-20

## 2023-04-20 RX ORDER — SODIUM CHLORIDE, SODIUM LACTATE, POTASSIUM CHLORIDE, CALCIUM CHLORIDE 600; 310; 30; 20 MG/100ML; MG/100ML; MG/100ML; MG/100ML
100 INJECTION, SOLUTION INTRAVENOUS CONTINUOUS
Status: DISCONTINUED | OUTPATIENT
Start: 2023-04-20 | End: 2023-04-20

## 2023-04-20 RX ORDER — DIPHENHYDRAMINE HYDROCHLORIDE 50 MG/ML
50 INJECTION INTRAMUSCULAR; INTRAVENOUS ONCE AS NEEDED
Status: DISCONTINUED | OUTPATIENT
Start: 2023-04-20 | End: 2023-04-20

## 2023-04-20 RX ORDER — NICOTINE POLACRILEX 4 MG
15 LOZENGE BUCCAL
Status: DISCONTINUED | OUTPATIENT
Start: 2023-04-20 | End: 2023-04-20

## 2023-04-20 NOTE — DISCHARGE INSTRUCTIONS
Home Care Instructions Following Your Pain Procedure     Yasmin Escalona,  It has been a pleasure to have you as our patient. To help you at home, you must follow these general discharge instructions. We will review these with you before you are discharged. It is our hope that you have a complete and uneventful recovery from our procedure. General Instructions:  What to Expect:  Bandages from your procedure today can be removed when you get home. Please avoid soaking and/or swimming for 24 hours. Showering is okay  It is normal to have increased pain symptoms and/or pain at injection site for up to 3-5 days after procedure, you can use heat or ice (20 minutes on 20 minutes off) for comfort. You may experience some temporary side effects which may include restlessness or insomnia, flushing of the face, or heart palpitations. Please contact the provider if these symptoms do not resolve within 3-4 days. Lightheadedness or nausea may occur and should resolve within 24 to 48 hours. If you develop a headache after treatment, rest, drink fluids (with caffeine, if possible) and take mild over-the-counter pain medication. If the headache does not improve with the above treatment, contact the physician. Home Medications:  Resume all previously prescribed medication. Please avoid taking NSAIDs (Non-Steriodal Anti-Inflammatory Drugs) such as:  Ibuprofen ( Advil, Motrin) Aleve (Naproxen), Diclofenac, Meloxicam for 6 hours after procedure. If you are on Coumadin (Warfarin) or any other anti-coagulant (or \"blood thinning\") medication such as Plavix (Clopidogrel), Xarelto (Rivaroxaban), Eliquis (Apixaban), Effient (Prasugrel) etc., restart on the following day from the procedure unless otherwise directed by your provider. If you are a diabetic, please increase the frequency of your glucose monitoring after the procedure as steroids may cause a temporary (2-3 day) increase in your blood sugar.   Contact your primary care physician if your blood sugar remains elevated as you may require some medication adjustment. Diet:  Resume your regular diet as tolerated. Activity: We recommend that you relax and rest during the rest of your procedure day. If you feel weakness in your arms or legs do not drive. Follow-up Appointment  Please schedule a follow-up visit within 3 to 4 weeks after your last procedure date. Question or Concerns:  Feel free to call our office with any questions or concerns at [phone] (option #2)    Sarita Sahu  Thank you for coming to [facility] for your procedure. The nurses try very hard to make sure you receive the best care possible. Your trust in them as well as us is greatly appreciated.     Thanks so much,   Dr. Sampson Julio

## 2023-04-20 NOTE — OPERATIVE REPORT
[facility]  Operative Report  2023     Chichi Ibarra Patient Status:  Hospital Outpatient Surgery    10/20/1960 MRN [id number]   Location [address] [address] Attending Kyle Sanchez MD   [facility] Day # 0 PCP Safia Stephenson DO     Indication: Adela Parsons is a 60year old female with lumbar radiculopathy    Preoperative Diagnosis:  Lumbar disc herniation with radiculopathy [M51.16]    Postoperative Diagnosis: Same as above. Procedure performed: LEFT LUMBAR 4-5, LUMBAR 5-SACRAL 1 TRANSFORAMINAL  EPIDURAL STEROID INJECTION MULTIPLE LEVEL with sedation      Anesthesia: Local and IV Sedation. EBL: Less than 1 ml. Procedure Description:  After reviewing the patient's history and performing a focused physical examination, the diagnosis was confirmed and contraindications such as infection and coagulopathy were ruled out. Following review of potential side effects and complications, including but not necessarily limited to infection, allergic reaction, local tissue breakdown, nerve injury, and paresis, the patient indicated they understood and agreed to proceed. After obtaining the informed consent, the patient was brought to the procedure room and monitored. Per my order and under my supervision, the patient was moderately sedated with intermittent intravenous doses of versed and fentanyl. The vital signs including continuous pulse oximetry and cardiac monitoring were monitored and recorded by an experienced R.N. The procedure started after the patient was adequately sedated. Total time  for sedation was 14 minutes. In the prone position, following sterile prep and drape of the lumbar region,  the  L4 neural foramen was identified under fluoroscopy. The skin and subcutaneous tissue was anesthetized via 25-gauge 1.5\" needle with approximately 2 cc of 1% lidocaine.   A 22-gauge 7\" Quincke spinal needle was introduced toward the inferior aspect of the junction between the transverse process and pedicle of the  L4 level atraumatically under fluoroscopic guidance. The needle was advanced into the anterior epidural space at this level. The needle position was confirmed under AP and lateral fluoroscopic view. Following negative aspiration for CSF and blood, approximately 1 cc of Omnipaque 240 was injected. An excellent contrast spread along the epidural space and the nerve root was obtained. At this point, 1cc of NS with 5 mg of dexamethasone was injected without complication. The needle was withdrawn with stylet in situ after being flushed with 1 cc PF lidocaine. The  L5 neural foramen was also identified under fluoroscopy. The skin and subcutaneous tissue was anesthetized via 25-gauge 1.5\" needle with approximately 2 cc of 1% lidocaine. A 22-gauge 5\" Quincke spinal needle was introduced toward the inferior aspect of the junction between the transverse process and pedicle of the L5 level atraumatically under fluoroscopic guidance. The needle was advanced into the anterior epidural space at this level. The needle position was confirmed under AP and lateral fluoroscopic view. Following negative aspiration for CSF and blood, approximately 1 cc of Omnipaque 240 was injected. An excellent contrast spread along the epidural space and the nerve root was obtained. At this point, 1cc of NS with 5 mg of dexamethasone was injected without complication. The needle was withdrawn with stylet in situ after being flushed with 1 cc PF lidocaine. .  The patient tolerated procedure very well. The patient was observed until discharge criteria met. Discharge instructions were given and patient was released to a responsible adult. Complications: None. Follow up: The patient was followed in the pain clinic as needed basis.         Tenisha Barton MD

## 2023-04-21 ENCOUNTER — TELEPHONE (OUTPATIENT)
Dept: PAIN CLINIC | Facility: CLINIC | Age: 63
End: 2023-04-21

## 2023-04-24 ENCOUNTER — TELEPHONE (OUTPATIENT)
Dept: PAIN CLINIC | Facility: CLINIC | Age: 63
End: 2023-04-24

## 2023-04-24 DIAGNOSIS — M54.16 LUMBAR RADICULOPATHY: Primary | ICD-10-CM

## 2023-04-24 NOTE — TELEPHONE ENCOUNTER
Pt calling in tears stating that she is in a lot of pain and does not know what else to do. Pt is stating that she does not feel any relief from injections that she received on 4/20/23.

## 2023-04-24 NOTE — TELEPHONE ENCOUNTER
Question Answer   Anesthesia Type Sedation   Provider [facility] Procedure Lab   Procedure Transforaminal   Laterality/Level right L4, L5   CPT (Hit enter after each entry) INJECTION, ANESTHETIC/STEROID, TRANSFORAMINAL EPIDURAL; LUMBAR/SACRAL, SINGLE LEVEL    INJECTION, ANESTHETIC/STEROID, TRANSFORAMINAL EPIDURAL; LUMBAR/SACRAL, ADD'L LEVEL   Medications to hold apixiban   Medical clearance requested (will send to Pain Navigator) Yes   Patient has Medicare coverage?  No   Comments (Please list entire procedure name here.) right lumbar 4-5, lumbar 5-sacral 1 transforaminal epidural steroid injection

## 2023-04-24 NOTE — TELEPHONE ENCOUNTER
Medical Clearance faxed to [address] [place] [address] at Fax #: [phone];[id number] r'cvd    23    RE: Alexx Bermeo    : 10/20/1960    Dear Dr. China Augustin,    Your patient is being scheduled for a pain management procedure at [facility].    Procedure:  Right L4/5,L5/S1 Transforaminal Injection   Date of Procedure: TBD -pending medical clearance  Physician: Donnell James- Anesthesiologist     Your patient is currently taking Eliquis.  usually recommends the medication be held for 3 days prior to injection. Please verify patient is cleared to proceed with pain management procedures.

## 2023-04-25 ENCOUNTER — TELEPHONE (OUTPATIENT)
Dept: FAMILY MEDICINE CLINIC | Facility: CLINIC | Age: 63
End: 2023-04-25

## 2023-04-25 RX ORDER — OXYCODONE HYDROCHLORIDE AND ACETAMINOPHEN 5; 325 MG/1; MG/1
1-2 TABLET ORAL EVERY 6 HOURS PRN
Qty: 45 TABLET | Refills: 0 | Status: ON HOLD | OUTPATIENT
Start: 2023-04-25 | End: 2023-05-25

## 2023-04-25 RX ORDER — DIAZEPAM 5 MG/1
5 TABLET ORAL EVERY 6 HOURS PRN
Qty: 30 TABLET | Refills: 0 | Status: ON HOLD | OUTPATIENT
Start: 2023-04-25 | End: 2023-05-25

## 2023-04-25 NOTE — TELEPHONE ENCOUNTER
So her back is complicated and unfortunately the injections are kind of an experiment to see what works and for how long. I'm not optimistic that they are going to take care of her issues, b ut we have to start somewhere. She should call [facility] office and let them  Know about the right side, maybe he can get her back in for another injection.  I refilled her med but I don;t think it's going to make a difference

## 2023-04-25 NOTE — TELEPHONE ENCOUNTER
Prior authorization request completed for: Right L4, L5 TFESI    Authorization # [id number]  Authorization dates: 4/25/2023 - 5/24/2023  CPT codes approved: 88042 / 03258   Number of visits/dates of service approved: 1  Physician: Dr. Khadra Woodson   Location: [facility]     Patient can be scheduled. Routed to Navigator.

## 2023-04-25 NOTE — TELEPHONE ENCOUNTER
Future Appointments   Date Time Provider Jennifer Stanley   5/5/2023 11:45 AM Antonio Mc MD [facility]     Pt states she is doing terrible- she thinks that the left injection has kicked in and her leg is feeling a lot better. Now the right leg is bothering her- and it has reached an all time high- she states \"she wants to cut it off- it hurts so bad\"    She states they only did an injection in L Side- not in the right side    Pt states before the injection the Left side was worse - and when the doctor asked her she said left was worse- and they only injected the left side    Notes from Pain yesterday:  Patient calling with pain to:  Right side sciatica to below the knee and some pain in the first 2 toes. Rating pain: 10+/10  States pain is: Stabbing, burning, throbbing, aching  Currently taking: Diazepam, Percocet 5-325 2 tabs Q6H, 1,000 mg extra strength Tylenol     Pt states she finished her last pain pills this morning- and 3 of the Diazepam left. Pt states she is in need of medication refill? Medication is pended for provider review.

## 2023-04-25 NOTE — TELEPHONE ENCOUNTER
Jovana Rosario MD  You 13 minutes ago (10:35 AM)       She should be on it for 1 week before next hold

## 2023-04-25 NOTE — TELEPHONE ENCOUNTER
Received Medical Clearance from [address] [place] [address] Dr KAMINSKI -patient is cleared to hold Eliquis for 3 days prior to procedure. Sent to scan.

## 2023-04-25 NOTE — TELEPHONE ENCOUNTER
Spoke with patient and advised that I have received medical clearance for Eliquis and insurance authorization. Patient mentioned she has not resumed Eliquis since Injection on 04/20/23 and had been holding for more than 3 days prior to injection. Advised patient that she should not be holding eliquis as this is an important medication and is not safe for her to be holding for longer than instructed and there are  risks of stroke,blood clot with her being off Eliquis for longer than instructed. Patient has been off Eliquis for 11 days, instructed patient to resume Eliquis today, and will check with  for how long he wants her to resume Eliquis before having her hold Eliquis prior to injection. Patient PARISA.

## 2023-04-25 NOTE — TELEPHONE ENCOUNTER
Pt was was advised of the information below- verbalized understanding    Pt states that she has not resumed her Eliquis- RN advised per our notes she was to hold it for 3 days . Pt is to restart medication and let Pain management know that she has restarted medication to make sure it will not interfere with upcoming procedure.   She can then hold it for 3 days prior

## 2023-05-01 ENCOUNTER — APPOINTMENT (OUTPATIENT)
Dept: GENERAL RADIOLOGY | Facility: HOSPITAL | Age: 63
End: 2023-05-01
Attending: EMERGENCY MEDICINE
Payer: COMMERCIAL

## 2023-05-01 ENCOUNTER — TELEPHONE (OUTPATIENT)
Dept: PAIN CLINIC | Facility: CLINIC | Age: 63
End: 2023-05-01

## 2023-05-01 ENCOUNTER — HOSPITAL ENCOUNTER (INPATIENT)
Facility: HOSPITAL | Age: 63
LOS: 3 days | Discharge: HOME | End: 2023-05-06
Attending: EMERGENCY MEDICINE | Admitting: HOSPITALIST
Payer: COMMERCIAL

## 2023-05-01 ENCOUNTER — TELEPHONE (OUTPATIENT)
Dept: FAMILY MEDICINE CLINIC | Facility: CLINIC | Age: 63
End: 2023-05-01

## 2023-05-01 DIAGNOSIS — M54.59 INTRACTABLE LOW BACK PAIN: Primary | ICD-10-CM

## 2023-05-01 LAB
ALBUMIN SERPL-MCNC: 3 G/DL (ref 3.4–5)
ALBUMIN/GLOB SERPL: 0.8 {RATIO} (ref 1–2)
ALP LIVER SERPL-CCNC: 87 U/L
ALT SERPL-CCNC: 18 U/L
ANION GAP SERPL CALC-SCNC: 5 MMOL/L (ref 0–18)
APTT PPP: 31.1 SECONDS (ref 23.3–35.6)
AST SERPL-CCNC: 16 U/L (ref 15–37)
BASOPHILS # BLD AUTO: 0.02 X10(3) UL (ref 0–0.2)
BASOPHILS NFR BLD AUTO: 0.3 %
BILIRUB SERPL-MCNC: 0.4 MG/DL (ref 0.1–2)
BUN BLD-MCNC: 11 MG/DL (ref 7–18)
CALCIUM BLD-MCNC: 8.8 MG/DL (ref 8.5–10.1)
CHLORIDE SERPL-SCNC: 109 MMOL/L (ref 98–112)
CO2 SERPL-SCNC: 25 MMOL/L (ref 21–32)
CREAT BLD-MCNC: 0.62 MG/DL
EOSINOPHIL # BLD AUTO: 0.1 X10(3) UL (ref 0–0.7)
EOSINOPHIL NFR BLD AUTO: 1.3 %
ERYTHROCYTE [DISTWIDTH] IN BLOOD BY AUTOMATED COUNT: 15.8 %
GFR SERPLBLD BASED ON 1.73 SQ M-ARVRAT: 100 ML/MIN/1.73M2 (ref 60–?)
GLOBULIN PLAS-MCNC: 3.6 G/DL (ref 2.8–4.4)
GLUCOSE BLD-MCNC: 101 MG/DL (ref 70–99)
GLUCOSE BLD-MCNC: 91 MG/DL (ref 70–99)
HCT VFR BLD AUTO: 40.8 %
HGB BLD-MCNC: 13.3 G/DL
IMM GRANULOCYTES # BLD AUTO: 0.02 X10(3) UL (ref 0–1)
IMM GRANULOCYTES NFR BLD: 0.3 %
INR BLD: 1.4 (ref 0.85–1.16)
LYMPHOCYTES # BLD AUTO: 1.45 X10(3) UL (ref 1–4)
LYMPHOCYTES NFR BLD AUTO: 18.6 %
MCH RBC QN AUTO: 28.2 PG (ref 26–34)
MCHC RBC AUTO-ENTMCNC: 32.6 G/DL (ref 31–37)
MCV RBC AUTO: 86.6 FL
MONOCYTES # BLD AUTO: 0.65 X10(3) UL (ref 0.1–1)
MONOCYTES NFR BLD AUTO: 8.4 %
NEUTROPHILS # BLD AUTO: 5.54 X10 (3) UL (ref 1.5–7.7)
NEUTROPHILS # BLD AUTO: 5.54 X10(3) UL (ref 1.5–7.7)
NEUTROPHILS NFR BLD AUTO: 71.1 %
OSMOLALITY SERPL CALC.SUM OF ELEC: 287 MOSM/KG (ref 275–295)
PLATELET # BLD AUTO: 277 10(3)UL (ref 150–450)
POTASSIUM SERPL-SCNC: 3.9 MMOL/L (ref 3.5–5.1)
PROT SERPL-MCNC: 6.6 G/DL (ref 6.4–8.2)
PROTHROMBIN TIME: 17.1 SECONDS (ref 11.6–14.8)
RBC # BLD AUTO: 4.71 X10(6)UL
SODIUM SERPL-SCNC: 139 MMOL/L (ref 136–145)
WBC # BLD AUTO: 7.8 X10(3) UL (ref 4–11)

## 2023-05-01 PROCEDURE — 72110 X-RAY EXAM L-2 SPINE 4/>VWS: CPT | Performed by: EMERGENCY MEDICINE

## 2023-05-01 PROCEDURE — 99223 1ST HOSP IP/OBS HIGH 75: CPT | Performed by: HOSPITALIST

## 2023-05-01 RX ORDER — TIZANIDINE 2 MG/1
2 TABLET ORAL EVERY 6 HOURS PRN
Status: DISCONTINUED | OUTPATIENT
Start: 2023-05-01 | End: 2023-05-06

## 2023-05-01 RX ORDER — PROCHLORPERAZINE EDISYLATE 5 MG/ML
5 INJECTION INTRAMUSCULAR; INTRAVENOUS EVERY 8 HOURS PRN
Status: DISCONTINUED | OUTPATIENT
Start: 2023-05-01 | End: 2023-05-06

## 2023-05-01 RX ORDER — ONDANSETRON 2 MG/ML
INJECTION INTRAMUSCULAR; INTRAVENOUS
Status: COMPLETED
Start: 2023-05-01 | End: 2023-05-01

## 2023-05-01 RX ORDER — HYDROMORPHONE HYDROCHLORIDE 1 MG/ML
1 INJECTION, SOLUTION INTRAMUSCULAR; INTRAVENOUS; SUBCUTANEOUS
Status: DISCONTINUED | OUTPATIENT
Start: 2023-05-01 | End: 2023-05-03

## 2023-05-01 RX ORDER — DIAZEPAM 5 MG/1
5 TABLET ORAL EVERY 6 HOURS PRN
Status: DISCONTINUED | OUTPATIENT
Start: 2023-05-01 | End: 2023-05-06

## 2023-05-01 RX ORDER — NICOTINE POLACRILEX 4 MG
15 LOZENGE BUCCAL
Status: DISCONTINUED | OUTPATIENT
Start: 2023-05-01 | End: 2023-05-06

## 2023-05-01 RX ORDER — GABAPENTIN 300 MG/1
300 CAPSULE ORAL 3 TIMES DAILY
Status: DISCONTINUED | OUTPATIENT
Start: 2023-05-01 | End: 2023-05-06

## 2023-05-01 RX ORDER — VENLAFAXINE HYDROCHLORIDE 75 MG/1
150 CAPSULE, EXTENDED RELEASE ORAL 2 TIMES DAILY
Status: DISCONTINUED | OUTPATIENT
Start: 2023-05-01 | End: 2023-05-06

## 2023-05-01 RX ORDER — ACETAMINOPHEN 500 MG
1000 TABLET ORAL EVERY 4 HOURS PRN
Status: DISCONTINUED | OUTPATIENT
Start: 2023-05-01 | End: 2023-05-06

## 2023-05-01 RX ORDER — SENNOSIDES 8.6 MG
17.2 TABLET ORAL NIGHTLY PRN
Status: DISCONTINUED | OUTPATIENT
Start: 2023-05-01 | End: 2023-05-06

## 2023-05-01 RX ORDER — ALBUTEROL SULFATE 2.5 MG/3ML
2.5 SOLUTION RESPIRATORY (INHALATION) 4 TIMES DAILY PRN
Status: DISCONTINUED | OUTPATIENT
Start: 2023-05-01 | End: 2023-05-06

## 2023-05-01 RX ORDER — DEXTROSE MONOHYDRATE 25 G/50ML
50 INJECTION, SOLUTION INTRAVENOUS
Status: DISCONTINUED | OUTPATIENT
Start: 2023-05-01 | End: 2023-05-06

## 2023-05-01 RX ORDER — ATORVASTATIN CALCIUM 20 MG/1
20 TABLET, FILM COATED ORAL NIGHTLY
Status: DISCONTINUED | OUTPATIENT
Start: 2023-05-01 | End: 2023-05-06

## 2023-05-01 RX ORDER — LAMOTRIGINE 150 MG/1
150 TABLET ORAL DAILY
Status: DISCONTINUED | OUTPATIENT
Start: 2023-05-01 | End: 2023-05-06

## 2023-05-01 RX ORDER — POLYETHYLENE GLYCOL 3350 17 G/17G
17 POWDER, FOR SOLUTION ORAL DAILY PRN
Status: DISCONTINUED | OUTPATIENT
Start: 2023-05-01 | End: 2023-05-06

## 2023-05-01 RX ORDER — BISACODYL 10 MG
10 SUPPOSITORY, RECTAL RECTAL
Status: DISCONTINUED | OUTPATIENT
Start: 2023-05-01 | End: 2023-05-06

## 2023-05-01 RX ORDER — METOPROLOL SUCCINATE 25 MG/1
25 TABLET, EXTENDED RELEASE ORAL
Status: DISCONTINUED | OUTPATIENT
Start: 2023-05-01 | End: 2023-05-05

## 2023-05-01 RX ORDER — OXCARBAZEPINE 150 MG/1
150 TABLET, FILM COATED ORAL 2 TIMES DAILY
Status: DISCONTINUED | OUTPATIENT
Start: 2023-05-01 | End: 2023-05-06

## 2023-05-01 RX ORDER — SODIUM PHOSPHATE, DIBASIC AND SODIUM PHOSPHATE, MONOBASIC 7; 19 G/133ML; G/133ML
1 ENEMA RECTAL ONCE AS NEEDED
Status: DISCONTINUED | OUTPATIENT
Start: 2023-05-01 | End: 2023-05-06

## 2023-05-01 RX ORDER — SODIUM CHLORIDE 9 MG/ML
INJECTION, SOLUTION INTRAVENOUS CONTINUOUS
Status: ACTIVE | OUTPATIENT
Start: 2023-05-01 | End: 2023-05-01

## 2023-05-01 RX ORDER — OXYCODONE HYDROCHLORIDE AND ACETAMINOPHEN 5; 325 MG/1; MG/1
2 TABLET ORAL EVERY 6 HOURS PRN
Status: DISCONTINUED | OUTPATIENT
Start: 2023-05-01 | End: 2023-05-03

## 2023-05-01 RX ORDER — MORPHINE SULFATE 4 MG/ML
2 INJECTION, SOLUTION INTRAMUSCULAR; INTRAVENOUS ONCE
Status: COMPLETED | OUTPATIENT
Start: 2023-05-01 | End: 2023-05-01

## 2023-05-01 RX ORDER — MELATONIN
3 NIGHTLY PRN
Status: DISCONTINUED | OUTPATIENT
Start: 2023-05-01 | End: 2023-05-06

## 2023-05-01 RX ORDER — ALBUTEROL SULFATE 90 UG/1
2 AEROSOL, METERED RESPIRATORY (INHALATION) EVERY 6 HOURS PRN
Status: DISCONTINUED | OUTPATIENT
Start: 2023-05-01 | End: 2023-05-06

## 2023-05-01 RX ORDER — HYDROMORPHONE HYDROCHLORIDE 1 MG/ML
0.5 INJECTION, SOLUTION INTRAMUSCULAR; INTRAVENOUS; SUBCUTANEOUS
Status: DISCONTINUED | OUTPATIENT
Start: 2023-05-01 | End: 2023-05-05

## 2023-05-01 RX ORDER — SODIUM CHLORIDE 9 MG/ML
1000 INJECTION, SOLUTION INTRAVENOUS ONCE
Status: COMPLETED | OUTPATIENT
Start: 2023-05-01 | End: 2023-05-01

## 2023-05-01 RX ORDER — ECHINACEA PURPUREA EXTRACT 125 MG
1 TABLET ORAL
Status: DISCONTINUED | OUTPATIENT
Start: 2023-05-01 | End: 2023-05-06

## 2023-05-01 RX ORDER — MORPHINE SULFATE 2 MG/ML
2 INJECTION, SOLUTION INTRAMUSCULAR; INTRAVENOUS EVERY 2 HOUR PRN
Status: ACTIVE | OUTPATIENT
Start: 2023-05-01 | End: 2023-05-01

## 2023-05-01 RX ORDER — ONDANSETRON 2 MG/ML
4 INJECTION INTRAMUSCULAR; INTRAVENOUS ONCE
Status: COMPLETED | OUTPATIENT
Start: 2023-05-01 | End: 2023-05-01

## 2023-05-01 RX ORDER — ALPRAZOLAM 1 MG/1
1 TABLET ORAL 3 TIMES DAILY PRN
Status: DISCONTINUED | OUTPATIENT
Start: 2023-05-01 | End: 2023-05-06

## 2023-05-01 RX ORDER — NICOTINE POLACRILEX 4 MG
30 LOZENGE BUCCAL
Status: DISCONTINUED | OUTPATIENT
Start: 2023-05-01 | End: 2023-05-06

## 2023-05-01 RX ORDER — ONDANSETRON 2 MG/ML
4 INJECTION INTRAMUSCULAR; INTRAVENOUS EVERY 6 HOURS PRN
Status: DISCONTINUED | OUTPATIENT
Start: 2023-05-01 | End: 2023-05-06

## 2023-05-01 NOTE — TELEPHONE ENCOUNTER
Pt calling in stating that she is in extreme pain. Worse than it has ever been. Pt states that she spoke to Dr. Lashaun Siddiqui office and they are recommending that she be admitted into the hospital for pain management and that she needed to reach out to Dr. Anna Cunningham office. Please advise.

## 2023-05-01 NOTE — ED INITIAL ASSESSMENT (HPI)
Pt c/o of right lower back pain that radiates to her buttocks and down her leg. Hx chronic back pain and had a mri about 5 weeks ago. Pt states that she had a fall in her home last night. Pt denies head injury or LOC.

## 2023-05-01 NOTE — TELEPHONE ENCOUNTER
Spoke to pt to confirm that pt was instructed to call our office to discuss being admitted to the hospital.     Pain reported at right-side waist radiating into butt cheek, down back of thigh to the knee. Pt describes \"hot lava\" type pain. Pt reporting that her body is hurting \"everywhere. \" She feels she does not have strength in her knees, she \"slid\" down to the floor last night and had to call her son to help her up off the floor. Asked pt if she has resumed her Eliquis, which she confirms. Pt states it was either 4/25/23 or possibly 4/24/23. Advised pt that we are trying to schedule her for the recommended procedure, LM on 4/25/23, but did not rcv a call back. Pt states she did not rcv that msg. Pt asks if she is still scheduled for an injection on Thursday. Advised pt that she was never scheduled, we did not provide a date, we asked that she call back after gathering feedback from [address] [place] [address] Drive,[facility] and did not rcv a call. Pt VU and states she must have been confused or got the info wrong. Advised pt that none of our providers have rights to admit pts to the hospital. Encouraged pt to go to the ED and be admitted through the ED. Pt states she doesn't know what her options are and wants to review what has been discussed. Reiterated to pt she can go to the ED to see if they can admit her. Further informed pt that we need to schedule her for the recommended procedure, will d/w Dr Mian Valdez.

## 2023-05-01 NOTE — ED QUICK NOTES
Orders for admission, patient is aware of plan and ready to go upstairs. Any questions, please call ED RN  at extension [phone].      Patient Covid vaccination status: Fully vaccinated     COVID Test Ordered in ED: None    COVID Suspicion at Admission: N/A    Running Infusions:      Mental Status/LOC at time of transport: a/ox4, back pain, unable to ambulate    Other pertinent information:   CIWA score: N/A   NIH score:  N/A

## 2023-05-01 NOTE — TELEPHONE ENCOUNTER
Pt is is not okay    She states she is at her low    She thinks that her pain is worse than it is ever been- she was at [employer] yesterday- they gave haer a shot of morphine and it did not     Pt states she is wants to be pu tin the hosptial for pain control- she states she can not sleep in her bed    She states she can not wait until Thursday for her injection    She can not walk well- she states she had to slide off the cough and army crawl across the floor last night    This states it is in the R butt cheek and thigh- she states that pain does not stop and she can not   RN Spoke with PCP- he advised that pt needs to go to [facility] ER to be seen- she needs to be admitted for pain control    Pt was also advised to contact Dr. Gaye Rivera office as she will not be able to wait until Thursday for injection    Pt verbalized understanding

## 2023-05-02 LAB
ANION GAP SERPL CALC-SCNC: 7 MMOL/L (ref 0–18)
BUN BLD-MCNC: 10 MG/DL (ref 7–18)
CALCIUM BLD-MCNC: 8.7 MG/DL (ref 8.5–10.1)
CHLORIDE SERPL-SCNC: 110 MMOL/L (ref 98–112)
CO2 SERPL-SCNC: 24 MMOL/L (ref 21–32)
CREAT BLD-MCNC: 0.7 MG/DL
GFR SERPLBLD BASED ON 1.73 SQ M-ARVRAT: 97 ML/MIN/1.73M2 (ref 60–?)
GLUCOSE BLD-MCNC: 102 MG/DL (ref 70–99)
GLUCOSE BLD-MCNC: 110 MG/DL (ref 70–99)
GLUCOSE BLD-MCNC: 88 MG/DL (ref 70–99)
GLUCOSE BLD-MCNC: 91 MG/DL (ref 70–99)
GLUCOSE BLD-MCNC: 96 MG/DL (ref 70–99)
GLUCOSE BLD-MCNC: 98 MG/DL (ref 70–99)
MAGNESIUM SERPL-MCNC: 1.9 MG/DL (ref 1.6–2.6)
OSMOLALITY SERPL CALC.SUM OF ELEC: 291 MOSM/KG (ref 275–295)
POTASSIUM SERPL-SCNC: 3.7 MMOL/L (ref 3.5–5.1)
SODIUM SERPL-SCNC: 141 MMOL/L (ref 136–145)

## 2023-05-02 PROCEDURE — 99232 SBSQ HOSP IP/OBS MODERATE 35: CPT | Performed by: INTERNAL MEDICINE

## 2023-05-02 RX ORDER — HEPARIN SODIUM 5000 [USP'U]/ML
5000 INJECTION, SOLUTION INTRAVENOUS; SUBCUTANEOUS EVERY 8 HOURS SCHEDULED
Status: DISCONTINUED | OUTPATIENT
Start: 2023-05-02 | End: 2023-05-05

## 2023-05-02 NOTE — PLAN OF CARE
Patient alert and oriented x4. VSS on 2L O2 NC. ADRIANA w CPAP at night. Pain managed with IV and PO PRN meds. Hx of numbness/tingling in legs. SCDs in place and ankle pumps encouraged. Pt up stand/pivot to chair. Tolerating diet, no c/o n/v. Purewick in place. DTV at 1115. Update 1858: pt unable to void, multiple bladder scans completed, bladder scans all < 400. Plan: manage pain.  PT valentina

## 2023-05-02 NOTE — PHYSICAL THERAPY NOTE
Order received, chart reviewed. Attempted to see pt for evaluation, pt sleeping, unable to arouse. Will hold and follow as appropriate.      Keila Liu, PT  05/02/23

## 2023-05-02 NOTE — PROGRESS NOTES
23 1528   Over the last 2 weeks, how often have you been bothered by any of the following problems? Little interest or pleasure in doing things 1   Feeling down, depressed, or hopeless 1   Trouble falling or staying asleep, or sleeping too much 1   Feeling tired or having little energy 1   Poor appetite or overeating 1   Trouble concentrating on things, such as reading the newspaper or watching television 1   Moving or speaking so slowly that other people could have noticed. Or the opposite - being so fidgety or restless that you have been moving around a lot more than usual 0   Thoughts that you would be better off dead, or of hurting yourself in some way 0   If you checked off any problems, how difficult have these problems made it for you to do your work, take care of things at home, or get along with other people? Somewhat difficult     [address] [facility] Resource Referral Counselor Note    Bere Mcdermott Patient Status:  Observation    10/20/1960 MRN [id number]   [facility] 3SW-A Attending Georges Almazan DO   Hosp Day # 0 PCP DO GORDY Little(subjective) The patient admits to having a history of anxiety and depression. She said, she sees a Psychiatrist, Dr. Bruno Dumont for medication management. Magda Karft states she use to see a therapist for counseling but stopped. She plans on going back soon to the therapist.  She denies any suicidal thoughts. O(objective) The patient kept closing her eyes during the assessment. It was difficult to understand a lot of what she was saying. A(assessment) The phq9 was completed. P(plan) The patient will follow up with Dr. Bruno Dumont and make an appointment with the psychotherapist she use to see.       Kerri Covarrubias, RN  2023  3:29 PM

## 2023-05-02 NOTE — PLAN OF CARE
NURSING ADMISSION NOTE      Patient admitted via Cart  Oriented to room. Safety precautions initiated. Bed in low position. Call light in reach. Patient A/O x4, VSS on RA, c/o severe pain. IV dilaudid and percocet PRN. , tele, ADRIANA w/CPAP. DTV w/purewick. Plan for PT eval and pain control. Safety measures in place.

## 2023-05-03 LAB
ANION GAP SERPL CALC-SCNC: 4 MMOL/L (ref 0–18)
BASOPHILS # BLD AUTO: 0.01 X10(3) UL (ref 0–0.2)
BASOPHILS NFR BLD AUTO: 0.1 %
BUN BLD-MCNC: 11 MG/DL (ref 7–18)
CALCIUM BLD-MCNC: 8.7 MG/DL (ref 8.5–10.1)
CHLORIDE SERPL-SCNC: 108 MMOL/L (ref 98–112)
CO2 SERPL-SCNC: 27 MMOL/L (ref 21–32)
CREAT BLD-MCNC: 0.65 MG/DL
EOSINOPHIL # BLD AUTO: 0.09 X10(3) UL (ref 0–0.7)
EOSINOPHIL NFR BLD AUTO: 1 %
ERYTHROCYTE [DISTWIDTH] IN BLOOD BY AUTOMATED COUNT: 16 %
GFR SERPLBLD BASED ON 1.73 SQ M-ARVRAT: 99 ML/MIN/1.73M2 (ref 60–?)
GLUCOSE BLD-MCNC: 101 MG/DL (ref 70–99)
GLUCOSE BLD-MCNC: 103 MG/DL (ref 70–99)
GLUCOSE BLD-MCNC: 109 MG/DL (ref 70–99)
GLUCOSE BLD-MCNC: 110 MG/DL (ref 70–99)
GLUCOSE BLD-MCNC: 115 MG/DL (ref 70–99)
HCT VFR BLD AUTO: 37.3 %
HGB BLD-MCNC: 11.9 G/DL
IMM GRANULOCYTES # BLD AUTO: 0.02 X10(3) UL (ref 0–1)
IMM GRANULOCYTES NFR BLD: 0.2 %
LYMPHOCYTES # BLD AUTO: 1.3 X10(3) UL (ref 1–4)
LYMPHOCYTES NFR BLD AUTO: 15 %
MCH RBC QN AUTO: 28.3 PG (ref 26–34)
MCHC RBC AUTO-ENTMCNC: 31.9 G/DL (ref 31–37)
MCV RBC AUTO: 88.6 FL
MONOCYTES # BLD AUTO: 0.75 X10(3) UL (ref 0.1–1)
MONOCYTES NFR BLD AUTO: 8.7 %
NEUTROPHILS # BLD AUTO: 6.49 X10 (3) UL (ref 1.5–7.7)
NEUTROPHILS # BLD AUTO: 6.49 X10(3) UL (ref 1.5–7.7)
NEUTROPHILS NFR BLD AUTO: 75 %
OSMOLALITY SERPL CALC.SUM OF ELEC: 288 MOSM/KG (ref 275–295)
PLATELET # BLD AUTO: 231 10(3)UL (ref 150–450)
POTASSIUM SERPL-SCNC: 4 MMOL/L (ref 3.5–5.1)
RBC # BLD AUTO: 4.21 X10(6)UL
SODIUM SERPL-SCNC: 139 MMOL/L (ref 136–145)
WBC # BLD AUTO: 8.7 X10(3) UL (ref 4–11)

## 2023-05-03 PROCEDURE — 99233 SBSQ HOSP IP/OBS HIGH 50: CPT | Performed by: HOSPITALIST

## 2023-05-03 RX ORDER — OXYCODONE HYDROCHLORIDE 10 MG/1
10 TABLET ORAL EVERY 6 HOURS PRN
Status: DISCONTINUED | OUTPATIENT
Start: 2023-05-03 | End: 2023-05-06

## 2023-05-03 RX ORDER — SODIUM CHLORIDE 9 MG/ML
INJECTION, SOLUTION INTRAVENOUS CONTINUOUS
Status: ACTIVE | OUTPATIENT
Start: 2023-05-03 | End: 2023-05-03

## 2023-05-03 RX ORDER — ACETAMINOPHEN 325 MG/1
650 TABLET ORAL EVERY 6 HOURS
Status: DISCONTINUED | OUTPATIENT
Start: 2023-05-03 | End: 2023-05-06

## 2023-05-03 NOTE — PLAN OF CARE
Patient alert and oriented x4. VSS on 1L O2. ADRIANA w CPAP at night. Pain controlled with PRN IV and PO pain meds. Reports hx of n/t in bilateral legs. SCDs in place. Tolerating diet, no c/o n/v. Pt DTV this morning. Unable to void. Bladder scan was 257 at 1234, will reassess. Plan: IV fluids, JOSE scheduled for 5/4, PT recommending KAY at discharge     1830: bladder scan 400ml, straight cath 400 mls out. Paged hospitalist, continue current urinary retention management. Redness noted to abdominal fold, pt states hx of having redness under abd fold and uses cream as needed. Endorsed to oncoming RN.

## 2023-05-03 NOTE — CM/SW NOTE
NICANOR queued for review. CM/SW to add to KAY referral once available.     SALMA ArangoN, RN-BC    [id number]

## 2023-05-03 NOTE — CM/SW NOTE
Department  notified of request for sravan VILLANUEVA referrals started. Assigned CM/SW to follow up with pt/family on further discharge planning.      Yahaira Mane  [facility]

## 2023-05-03 NOTE — PLAN OF CARE
A&Ox4. VSS. On room air. . IS encouraged. Telemetry monitoring. SCDs on BLE. Tolerating diet. Last BM 5/1. Pure wick in place. Patient due to void, bladder scan still below 400. Pain controlled with PO meds. Bruising on buttocks. Log rolling. Plan for [facility] on Thursday. Patient updated on plan of care. Safety precautions maintained. Call light within reach. Will continue to monitor.

## 2023-05-04 ENCOUNTER — APPOINTMENT (OUTPATIENT)
Dept: GENERAL RADIOLOGY | Facility: HOSPITAL | Age: 63
End: 2023-05-04
Attending: ANESTHESIOLOGY
Payer: COMMERCIAL

## 2023-05-04 LAB
ANION GAP SERPL CALC-SCNC: 5 MMOL/L (ref 0–18)
BASOPHILS # BLD AUTO: 0.02 X10(3) UL (ref 0–0.2)
BASOPHILS NFR BLD AUTO: 0.4 %
BILIRUB UR QL STRIP.AUTO: NEGATIVE
BUN BLD-MCNC: 14 MG/DL (ref 7–18)
CALCIUM BLD-MCNC: 8.6 MG/DL (ref 8.5–10.1)
CHLORIDE SERPL-SCNC: 111 MMOL/L (ref 98–112)
CLARITY UR REFRACT.AUTO: CLEAR
CO2 SERPL-SCNC: 25 MMOL/L (ref 21–32)
COLOR UR AUTO: YELLOW
CREAT BLD-MCNC: 0.67 MG/DL
EOSINOPHIL # BLD AUTO: 0.17 X10(3) UL (ref 0–0.7)
EOSINOPHIL NFR BLD AUTO: 3.5 %
ERYTHROCYTE [DISTWIDTH] IN BLOOD BY AUTOMATED COUNT: 15.9 %
GFR SERPLBLD BASED ON 1.73 SQ M-ARVRAT: 98 ML/MIN/1.73M2 (ref 60–?)
GLUCOSE BLD-MCNC: 102 MG/DL (ref 70–99)
GLUCOSE BLD-MCNC: 114 MG/DL (ref 70–99)
GLUCOSE BLD-MCNC: 130 MG/DL (ref 70–99)
GLUCOSE BLD-MCNC: 167 MG/DL (ref 70–99)
GLUCOSE BLD-MCNC: 190 MG/DL (ref 70–99)
GLUCOSE BLD-MCNC: 62 MG/DL (ref 70–99)
GLUCOSE BLD-MCNC: 66 MG/DL (ref 70–99)
GLUCOSE BLD-MCNC: 88 MG/DL (ref 70–99)
GLUCOSE BLD-MCNC: 94 MG/DL (ref 70–99)
GLUCOSE UR STRIP.AUTO-MCNC: NEGATIVE MG/DL
HCT VFR BLD AUTO: 36.6 %
HGB BLD-MCNC: 10.8 G/DL
IMM GRANULOCYTES # BLD AUTO: 0.02 X10(3) UL (ref 0–1)
IMM GRANULOCYTES NFR BLD: 0.4 %
INR BLD: 1.06 (ref 0.85–1.16)
KETONES UR STRIP.AUTO-MCNC: NEGATIVE MG/DL
LEUKOCYTE ESTERASE UR QL STRIP.AUTO: NEGATIVE
LYMPHOCYTES # BLD AUTO: 1.34 X10(3) UL (ref 1–4)
LYMPHOCYTES NFR BLD AUTO: 27.7 %
MCH RBC QN AUTO: 28.3 PG (ref 26–34)
MCHC RBC AUTO-ENTMCNC: 29.5 G/DL (ref 31–37)
MCV RBC AUTO: 95.8 FL
MONOCYTES # BLD AUTO: 0.45 X10(3) UL (ref 0.1–1)
MONOCYTES NFR BLD AUTO: 9.3 %
NEUTROPHILS # BLD AUTO: 2.83 X10 (3) UL (ref 1.5–7.7)
NEUTROPHILS # BLD AUTO: 2.83 X10(3) UL (ref 1.5–7.7)
NEUTROPHILS NFR BLD AUTO: 58.7 %
NITRITE UR QL STRIP.AUTO: NEGATIVE
OSMOLALITY SERPL CALC.SUM OF ELEC: 292 MOSM/KG (ref 275–295)
PH UR STRIP.AUTO: 5 [PH] (ref 5–8)
PLATELET # BLD AUTO: 218 10(3)UL (ref 150–450)
POTASSIUM SERPL-SCNC: 3.9 MMOL/L (ref 3.5–5.1)
PROT UR STRIP.AUTO-MCNC: 30 MG/DL
PROTHROMBIN TIME: 13.8 SECONDS (ref 11.6–14.8)
RBC # BLD AUTO: 3.82 X10(6)UL
RBC #/AREA URNS AUTO: >10 /HPF
RBC UR QL AUTO: NEGATIVE
SODIUM SERPL-SCNC: 141 MMOL/L (ref 136–145)
SP GR UR STRIP.AUTO: >1.03 (ref 1–1.03)
UROBILINOGEN UR STRIP.AUTO-MCNC: <2 MG/DL
WBC # BLD AUTO: 4.8 X10(3) UL (ref 4–11)

## 2023-05-04 PROCEDURE — 64484 NJX AA&/STRD TFRM EPI L/S EA: CPT | Performed by: ANESTHESIOLOGY

## 2023-05-04 PROCEDURE — 3E0R3BZ INTRODUCTION OF ANESTHETIC AGENT INTO SPINAL CANAL, PERCUTANEOUS APPROACH: ICD-10-PCS | Performed by: ANESTHESIOLOGY

## 2023-05-04 PROCEDURE — 3E0R33Z INTRODUCTION OF ANTI-INFLAMMATORY INTO SPINAL CANAL, PERCUTANEOUS APPROACH: ICD-10-PCS | Performed by: ANESTHESIOLOGY

## 2023-05-04 PROCEDURE — 99233 SBSQ HOSP IP/OBS HIGH 50: CPT | Performed by: HOSPITALIST

## 2023-05-04 PROCEDURE — 64483 NJX AA&/STRD TFRM EPI L/S 1: CPT | Performed by: ANESTHESIOLOGY

## 2023-05-04 RX ORDER — INSULIN ASPART 100 [IU]/ML
3 INJECTION, SOLUTION INTRAVENOUS; SUBCUTANEOUS ONCE
Status: CANCELLED | OUTPATIENT
Start: 2023-05-04 | End: 2023-05-04

## 2023-05-04 RX ORDER — SODIUM CHLORIDE 9 MG/ML
INJECTION, SOLUTION INTRAVENOUS CONTINUOUS
Status: DISCONTINUED | OUTPATIENT
Start: 2023-05-04 | End: 2023-05-05

## 2023-05-04 RX ORDER — LIDOCAINE HYDROCHLORIDE 10 MG/ML
INJECTION, SOLUTION EPIDURAL; INFILTRATION; INTRACAUDAL; PERINEURAL
Status: DISCONTINUED | OUTPATIENT
Start: 2023-05-04 | End: 2023-05-04 | Stop reason: HOSPADM

## 2023-05-04 RX ORDER — SODIUM CHLORIDE 9 MG/ML
INJECTION INTRAVENOUS
Status: DISCONTINUED | OUTPATIENT
Start: 2023-05-04 | End: 2023-05-04 | Stop reason: HOSPADM

## 2023-05-04 RX ORDER — ONDANSETRON 2 MG/ML
4 INJECTION INTRAMUSCULAR; INTRAVENOUS ONCE AS NEEDED
Status: DISCONTINUED | OUTPATIENT
Start: 2023-05-04 | End: 2023-05-04 | Stop reason: HOSPADM

## 2023-05-04 RX ORDER — DEXTROSE MONOHYDRATE 25 G/50ML
INJECTION, SOLUTION INTRAVENOUS
Status: COMPLETED
Start: 2023-05-04 | End: 2023-05-04

## 2023-05-04 RX ORDER — DEXTROSE MONOHYDRATE 25 G/50ML
50 INJECTION, SOLUTION INTRAVENOUS
Status: DISCONTINUED | OUTPATIENT
Start: 2023-05-04 | End: 2023-05-04 | Stop reason: HOSPADM

## 2023-05-04 RX ORDER — NICOTINE POLACRILEX 4 MG
15 LOZENGE BUCCAL
Status: DISCONTINUED | OUTPATIENT
Start: 2023-05-04 | End: 2023-05-04 | Stop reason: HOSPADM

## 2023-05-04 RX ORDER — DEXAMETHASONE SODIUM PHOSPHATE 10 MG/ML
INJECTION, SOLUTION INTRAMUSCULAR; INTRAVENOUS
Status: DISCONTINUED | OUTPATIENT
Start: 2023-05-04 | End: 2023-05-04 | Stop reason: HOSPADM

## 2023-05-04 RX ORDER — DIPHENHYDRAMINE HYDROCHLORIDE 50 MG/ML
50 INJECTION INTRAMUSCULAR; INTRAVENOUS ONCE AS NEEDED
Status: DISCONTINUED | OUTPATIENT
Start: 2023-05-04 | End: 2023-05-04 | Stop reason: HOSPADM

## 2023-05-04 RX ORDER — MIDAZOLAM HYDROCHLORIDE 1 MG/ML
INJECTION INTRAMUSCULAR; INTRAVENOUS
Status: DISCONTINUED | OUTPATIENT
Start: 2023-05-04 | End: 2023-05-04 | Stop reason: HOSPADM

## 2023-05-04 RX ORDER — SODIUM CHLORIDE, SODIUM LACTATE, POTASSIUM CHLORIDE, CALCIUM CHLORIDE 600; 310; 30; 20 MG/100ML; MG/100ML; MG/100ML; MG/100ML
100 INJECTION, SOLUTION INTRAVENOUS CONTINUOUS
Status: DISCONTINUED | OUTPATIENT
Start: 2023-05-04 | End: 2023-05-05

## 2023-05-04 RX ORDER — NICOTINE POLACRILEX 4 MG
30 LOZENGE BUCCAL
Status: DISCONTINUED | OUTPATIENT
Start: 2023-05-04 | End: 2023-05-04 | Stop reason: HOSPADM

## 2023-05-04 NOTE — OPERATIVE REPORT
[facility]  Operative Report  2023     Griffin Ross Patient Status:  Inpatient    10/20/1960 MRN [id number]   Location [address] [address] Attending Bradley Whitfield MD   [facility] Day # 1 PCP Jay Rivera DO     Indication: Lauryn Mcduffie is a 60year old female with lumbar radiculopathy    Preoperative Diagnosis:  Lumbar radiculopathy [M54.16]    Postoperative Diagnosis: Same as above. Procedure performed: RIGHT LUMBAR 4-5, LUMBAR 5-SACRAL 1 TRANSFORAMINAL EPIDURAL STEROID INJECTION MULTIPLE LEVEL with sedation      Anesthesia: Local and IV Sedation. EBL: Less than 1 ml. Procedure Description:  After reviewing the patient's history and performing a focused physical examination, the diagnosis was confirmed and contraindications such as infection and coagulopathy were ruled out. Following review of potential side effects and complications, including but not necessarily limited to infection, allergic reaction, local tissue breakdown, nerve injury, and paresis, the patient indicated they understood and agreed to proceed. After obtaining the informed consent, the patient was brought to the procedure room and monitored. Per my order and under my supervision, the patient was moderately sedated with intermittent intravenous doses of versed and fentanyl. The vital signs including continuous pulse oximetry and cardiac monitoring were monitored and recorded by an experienced R.N. The procedure started after the patient was adequately sedated. Total time  for sedation was 15 minutes. In the prone position, following sterile prep and drape of the lumbar region,  the  L4 neural foramen was identified under fluoroscopy. The skin and subcutaneous tissue was anesthetized via 25-gauge 1.5\" needle with approximately 2 cc of 1% lidocaine.   A 22-gauge 5\" Quincke spinal needle was introduced toward the inferior aspect of the junction between the transverse process and pedicle of the  L4 level atraumatically under fluoroscopic guidance. The needle was advanced into the anterior epidural space at this level. The needle position was confirmed under AP and lateral fluoroscopic view. Following negative aspiration for CSF and blood, approximately 1 cc of Omnipaque 240 was injected. An excellent contrast spread along the epidural space and the nerve root was obtained. At this point, 1cc of NS with 5 mg of dexamethasone was injected without complication. The needle was withdrawn with stylet in situ after being flushed with 1 cc PF lidocaine. The  L5 neural foramen was also identified under fluoroscopy. The skin and subcutaneous tissue was anesthetized via 25-gauge 1.5\" needle with approximately 2 cc of 1% lidocaine. A 22-gauge 5\" Quincke spinal needle was introduced toward the inferior aspect of the junction between the transverse process and pedicle of the L5 level atraumatically under fluoroscopic guidance. The needle was advanced into the anterior epidural space at this level. The needle position was confirmed under AP and lateral fluoroscopic view. Following negative aspiration for CSF and blood, approximately 1 cc of Omnipaque 240 was injected. An excellent contrast spread along the epidural space and the nerve root was obtained. At this point, 1cc of NS with 5 mg of dexamethasone was injected without complication. The needle was withdrawn with stylet in situ after being flushed with 1 cc PF lidocaine. .  The patient tolerated procedure very well. The patient was observed until discharge criteria met. Discharge instructions were given and patient was released to a responsible adult. Complications: None. Follow up: The patient was followed in the pain clinic as needed basis.         Dallas Acosta MD

## 2023-05-04 NOTE — CM/SW NOTE
PASSR attached to sravan VILLANUEVA referral- still waiting on responses. JOSE planned for today.     Marc Dela Cruz LCSW  /Discharge Planner

## 2023-05-04 NOTE — CM/SW NOTE
05/04/23 1000   CM/SW Referral Data   Referral Source Physician   Reason for Referral Discharge planning   Informant Patient   Patient Info   Patient's Current Mental Status at Time of Assessment Alert;Oriented   Patient's [address]   Number of Levels in Home 1   Patient lives with Alone   Patient Status Prior to Admission   Services in place prior to admission DME/Supplies at home   Type of DME/Supplies Wheeled [employer] [facility]   Discharge Needs   Anticipated D/C needs Subacute rehab   Services Requested   PASRR Level 1 Submitted Yes       Sw met with pt to assess for dc needs. Pt is 59 yo female, admitted due to intractable low back pain. PT did eval yesterday and advised KAY. PASSR was done yesterday. KAY referrals were sent out. Pt in agreement to KAY stating the pain has never been this bad. Pt to have  JOSE this am.    Pt had admission in March and [facility] was set up but then she never accepted the [facility] visits stating she did not understand what they would do for her. Will provide KAY list to pt and also see how she does with therapy after JOSE.     Pushpa French LCSW  /Discharge Planner

## 2023-05-04 NOTE — PLAN OF CARE
Patient is alert and oriented x 4. On room air. IVF infusing at 100 ml/hr. Unable to void - will  monitor and straight cath accordingly. Patient rates pain at 5/10, sensation to BLE is intact. SCDs on. Worked with Physical Therapy - hollis VILLANUEVA. To JOSE today, NPO since midnight. IS and ankle pumps encouraged, call light within reach and encouraged to call for assistance. All safety precautions and alarms in place.

## 2023-05-04 NOTE — DISCHARGE INSTRUCTIONS
You have been given medication that makes you sleepy. This may have included both pain medication and sleeping medication. Most of the effects have worn off but you may still have some drowsiness for the next 6 to 8 hours. Home Care  Follow these guidelines when you get home:    --Don't drink any alcohol for the next 24 hours. --Don't drive, operate dangerous machinery, make important business or personal    decisions, or sign legal documents during the next 24 hours. Follow Up Care  Follow up with your healthcare provider if you are not alert and back to your usual level of activity within 12 hours    When to seek medical advice  Call your healthcare provider right away if any of these occur:    --Drowsiness that gets worse  --Weakness or dizziness that gets worse  --Repeated vomiting  --You can not be awakened    Home Care Instructions Following Your Pain Procedure     Sherita Pepper,  It has been a pleasure to have you as our patient. To help you at home, you must follow these general discharge instructions. We will review these with you before you are discharged. It is our hope that you have a complete and uneventful recovery from our procedure. General Instructions:  What to Expect:  Bandages from your procedure today can be removed when you get home. Please avoid soaking and/or swimming for 24 hours. Showering is okay  It is normal to have increased pain symptoms and/or pain at injection site for up to 3-5 days after procedure, you can use heat or ice (20 minutes on 20 minutes off) for comfort. You may experience some temporary side effects which may include restlessness or insomnia, flushing of the face, or heart palpitations. Please contact the provider if these symptoms do not resolve within 3-4 days. Lightheadedness or nausea may occur and should resolve within 24 to 48 hours.   If you develop a headache after treatment, rest, drink fluids (with caffeine, if possible) and take mild over-the-counter pain medication. If the headache does not improve with the above treatment, contact the physician. Home Medications:  Resume all previously prescribed medication. Please avoid taking NSAIDs (Non-Steriodal Anti-Inflammatory Drugs) such as:  Ibuprofen ( Advil, Motrin) Aleve (Naproxen), Diclofenac, Meloxicam for 6 hours after procedure. If you are on Coumadin (Warfarin) or any other anti-coagulant (or \"blood thinning\") medication such as Plavix (Clopidogrel), Xarelto (Rivaroxaban), Eliquis (Apixaban), Effient (Prasugrel) etc., restart on the following day from the procedure unless otherwise directed by your provider. If you are a diabetic, please increase the frequency of your glucose monitoring after the procedure as steroids may cause a temporary (2-3 day) increase in your blood sugar. Contact your primary care physician if your blood sugar remains elevated as you may require some medication adjustment. Diet:  Resume your regular diet as tolerated. Activity: We recommend that you relax and rest during the rest of your procedure day. If you feel weakness in your arms or legs do not drive. Follow-up Appointment  Please schedule a follow-up visit within 3 to 4 weeks after your last procedure date. Question or Concerns:  Feel free to call our office with any questions or concerns at [phone] (option #2)    Mandie Otoole  Thank you for coming to [facility] for your procedure. The nurses try very hard to make sure you receive the best care possible. Your trust in them as well as us is greatly appreciated. Thanks so much,   Dr. Nohemi Rodriguez,  It has been a pleasure to have you as our patient. To help you at home, you must follow these general discharge instructions. We will review these with you before you are discharged. It is our hope that you have a complete and uneventful recovery from our procedure.      General Instructions:  What to Expect:  Bandages from your procedure today can be removed when you get home. Please avoid soaking and/or swimming for 24 hours. Showering is okay  It is normal to have increased pain symptoms and/or pain at injection site for up to 3-5 days after procedure, you can use heat or ice (20 minutes on 20 minutes off) for comfort. You may experience some temporary side effects which may include restlessness or insomnia, flushing of the face, or heart palpitations. Please contact the provider if these symptoms do not resolve within 3-4 days. Lightheadedness or nausea may occur and should resolve within 24 to 48 hours. If you develop a headache after treatment, rest, drink fluids (with caffeine, if possible) and take mild over-the-counter pain medication. If the headache does not improve with the above treatment, contact the physician. Home Medications:  Resume all previously prescribed medication. Please avoid taking NSAIDs (Non-Steriodal Anti-Inflammatory Drugs) such as:  Ibuprofen ( Advil, Motrin) Aleve (Naproxen), Diclofenac, Meloxicam for 6 hours after procedure. If you are on Coumadin (Warfarin) or any other anti-coagulant (or \"blood thinning\") medication such as Plavix (Clopidogrel), Xarelto (Rivaroxaban), Eliquis (Apixaban), Effient (Prasugrel) etc., restart on the following day from the procedure unless otherwise directed by your provider. If you are a diabetic, please increase the frequency of your glucose monitoring after the procedure as steroids may cause a temporary (2-3 day) increase in your blood sugar. Contact your primary care physician if your blood sugar remains elevated as you may require some medication adjustment. Diet:  Resume your regular diet as tolerated. Activity: We recommend that you relax and rest during the rest of your procedure day. If you feel weakness in your arms or legs do not drive.   Follow-up Appointment  Please schedule a follow-up visit within 3 to 4 weeks after your last procedure date. Question or Concerns:  Feel free to call our office with any questions or concerns at [phone] (option #2)    Dana Velasquez  Thank you for coming to [facility] for your procedure. The nurses try very hard to make sure you receive the best care possible. Your trust in them as well as us is greatly appreciated. Thanks so much,   Dr. Maulik Ty      Please follow up with [facility] regarding further Care giver resources at [phone].

## 2023-05-04 NOTE — PLAN OF CARE
Pt A/OX4. VSS. O2 1L/NC/ CPap at night. /Tele. LE pain being adequately managed with prn and medications. Pt NPO after midnight for JOSE this am, pt indicates understanding. Pt up to [facility] x1. Continues with difficulty voiding, unable to urinate during shift. Bladder scans completed for 176 & 198. Pt non symptomatic, no c/o pressure of pain.   Will cont to monitor

## 2023-05-05 LAB
GLUCOSE BLD-MCNC: 129 MG/DL (ref 70–99)
GLUCOSE BLD-MCNC: 144 MG/DL (ref 70–99)
GLUCOSE BLD-MCNC: 145 MG/DL (ref 70–99)
GLUCOSE BLD-MCNC: 169 MG/DL (ref 70–99)

## 2023-05-05 PROCEDURE — 99233 SBSQ HOSP IP/OBS HIGH 50: CPT | Performed by: HOSPITALIST

## 2023-05-05 RX ORDER — METOPROLOL SUCCINATE 25 MG/1
25 TABLET, EXTENDED RELEASE ORAL
Status: DISCONTINUED | OUTPATIENT
Start: 2023-05-05 | End: 2023-05-06

## 2023-05-05 NOTE — CM/SW NOTE
CM sent home health referral via [employer] system. MICHEAL/DYLLAN to follow up with patient for choice prior to discharge.      Wendi Griggs RN Case Manager [id number] Discussed with pt who states he cannot locate his activator. Review of pt's transmission shows he had an 8 second pause on 8/25/2018 at 2204. Does not remember any dizziness, syncope at the time. Pt is in cryptogenic stroke group and alerts for lisbeth and pause are not turned on. Dr Abena Jurado reviewed transmission. Pt advised not to drive. Pt scheduled for appt tomorrow at 0915.   KB

## 2023-05-05 NOTE — PLAN OF CARE
Pt A/OX4. VSS. /Tele. IVF per orders. R JOSE sites with bandaids x2 C/D/I. Pt up with x1 assist to the bathroom this evening, voiding without any difficulty. LE pain being adequately managed with prn and medications.   Will cont to monitor

## 2023-05-05 NOTE — CM/SW NOTE
Pt has been given KAY list- have been in to see her several times and she is still reviewing.       Jori Cruz, JENNW  /Discharge Planner

## 2023-05-05 NOTE — CM/SW NOTE
Pt chose [facility] [place] [facility] and they are reserved in aidin. [facility] ran her benefits and found that she has $8466 out of pocket remaing. Pt will have 50% coverage until out of pocket is met. Discussed above with pt and she wants to know what they will expect for her to pay up front .     SW also discussed private caregiver and provided list.    Onel Louie LCSW  /Discharge Planner

## 2023-05-05 NOTE — PLAN OF CARE
Pt AOx4. Numbess/tingling to BLE per pt's baseline. C/o moderate-severe pain to lower back, relieved by PO pain meds. Bandaids x2 to right back, CDI. Spoke with hoa Herzog to resume [facility]. IM MD made aware. VS remain stable on room air. IVF infusing per order. Voiding freely, episode of incontinence this AM d/t urgency. Currently clean and dry. Last BM 5/3, see MAR. Tolerating diet, denies nausea. Worked with therapy, up x1 w/walker. SCDs bilaterally, ankle pumps encouraged. IS encouraged. Plan to discharge to [facility] pending ins auth/placement. Pt updated on plan of care, all questions answered. Belongings within reach, instructed to call for assistance.

## 2023-05-05 NOTE — CM/SW NOTE
Sw met with pt- explained that her 505 copay for KAY would be approx $275 per day. She would like [employer] to submit for auth. We discussed private cg also and she has a neighbor that she may be able to hire. SW requested [facility] [employer] to submit for auth. Will send [facility] referrals in aidin also.     Philippe Campos LCSW  /Discharge Planner

## 2023-05-06 VITALS
DIASTOLIC BLOOD PRESSURE: 69 MMHG | SYSTOLIC BLOOD PRESSURE: 130 MMHG | OXYGEN SATURATION: 99 % | TEMPERATURE: 98 F | HEIGHT: 65.75 IN | HEART RATE: 60 BPM | WEIGHT: 293 LBS | BODY MASS INDEX: 47.65 KG/M2 | RESPIRATION RATE: 18 BRPM

## 2023-05-06 LAB
GLUCOSE BLD-MCNC: 101 MG/DL (ref 70–99)
GLUCOSE BLD-MCNC: 113 MG/DL (ref 70–99)
GLUCOSE BLD-MCNC: 93 MG/DL (ref 70–99)

## 2023-05-06 PROCEDURE — 99239 HOSP IP/OBS DSCHRG MGMT >30: CPT | Performed by: HOSPITALIST

## 2023-05-06 RX ORDER — TIZANIDINE 2 MG/1
1 TABLET ORAL 3 TIMES DAILY
Qty: 90 TABLET | Refills: 0 | Status: SHIPPED | COMMUNITY
Start: 2023-05-06

## 2023-05-06 RX ORDER — TIZANIDINE 2 MG/1
1 TABLET ORAL 3 TIMES DAILY
Qty: 90 TABLET | Refills: 0 | Status: SHIPPED | OUTPATIENT
Start: 2023-05-06 | End: 2023-05-06

## 2023-05-06 RX ORDER — GABAPENTIN 100 MG/1
300 CAPSULE ORAL 3 TIMES DAILY
Qty: 90 CAPSULE | Refills: 2 | Status: SHIPPED | OUTPATIENT
Start: 2023-05-06 | End: 2023-05-06

## 2023-05-06 RX ORDER — GABAPENTIN 100 MG/1
300 CAPSULE ORAL 3 TIMES DAILY
Qty: 90 CAPSULE | Refills: 2 | Status: SHIPPED | COMMUNITY
Start: 2023-05-06

## 2023-05-06 RX ORDER — VENLAFAXINE HYDROCHLORIDE 150 MG/1
300 CAPSULE, EXTENDED RELEASE ORAL DAILY
Status: SHIPPED | COMMUNITY
Start: 2023-05-06

## 2023-05-06 NOTE — PLAN OF CARE
A&Ox4. On room air. . IS encouraged. Telemetry monitoring. SCDs on BLE. Ankle pumps encouraged. Patient has bilateral numbness and tingling. Tolerating diet. Last BM 5/3. Voiding to the bathroom. Pain controlled with PO meds. Up with assist using walker. KAY pending. Patient updated on plan of care. Safety precautions maintained. Call light within reach.

## 2023-05-06 NOTE — CM/SW NOTE
Spoke with patient regarding discharge planning. She states she has decided to go home at discharge. She reached out to her neighbors to see if they are able to stay with her/help her at discharge however they have not returned her call yet. Informed her [facility] able to accept. Requested she contact SW again when she confirms plan with her neighbors. Encouraged her to reach out to them again. Await confirmation that patient has help at home. Addendum: Asked Elaine from [employer] to contact patient to assist her with caregiver resources. SW/MICHEAL to remain available for support and/or discharge planning.     LENA Wright  Discharge Planner  [phone]

## 2023-05-06 NOTE — PHYSICAL THERAPY NOTE
PHYSICAL THERAPY TREATMENT NOTE - INPATIENT    Room Number: 376/376-A     Session: 2     Number of Visits to Meet Established Goals: 3    Presenting Problem: back pain  Co-Morbidities : h/o bipolar disorder     History related to current admission: Patient is a 60year old female admitted on 2023 from home for intractable back pain. ASSESSMENT     Pt was able to amb with RW 15 x 2 with RW with supervision. Pt requires CGA for bed mobility. Pt reports 8/10 pain but was able to complete bed mobility, transfers, and amb without significant difficulty. Pt continues to present with decrease strength, decrease balance, and reports of pain. Pt may benefit from continued PT while in inpt setting to address above deficits and facilitate return to highest level of functional independence. Pt/pt son educated on activity/discharge recommendations and verbalize understanding. DISCHARGE RECOMMENDATIONS  PT Discharge Recommendations: 24 hour care/supervision;Home with home health PT (initial 24 hr supervision/assist)     PLAN  PT Treatment Plan: Bed mobility; Body mechanics; Endurance; Energy conservation;Patient education; Family education;Gait training;Range of motion;Stoop training;Strengthening;Stair training;Transfer training;Balance training  Rehab Potential : Good  Frequency (Obs):  (2-3x/week)    CURRENT GOALS     Goal #1 Patient is able to demonstrate supine - sit EOB @ level: supervision      Goal #2 Patient is able to demonstrate transfers Sit to/from Stand at assistance level: supervision      Goal #3 Patient is able to ambulate 50 feet with assist device: walker - rolling at assistance level: supervision      Goal #4     Goal #5     Goal #6     Goal Comments: Goals established on 5/3/2023      2023 all goals ongoing      SUBJECTIVE  \"I'm doing much better today. \"    OBJECTIVE       WEIGHT BEARING RESTRICTION                   PAIN ASSESSMENT   Ratin  Location: r hip  Management Techniques: Activity promotion    BALANCE                                                                                                                       Static Sitting: Good  Dynamic Sitting: Good           Static Standing: Fair  Dynamic Standing: Fair    ACTIVITY TOLERANCE                         O2 WALK         AM-PAC '6-Clicks' INPATIENT SHORT FORM - BASIC MOBILITY  How much difficulty does the patient currently have. .. Patient Difficulty: Turning over in bed (including adjusting bedclothes, sheets and blankets)?: A Little   Patient Difficulty: Sitting down on and standing up from a chair with arms (e.g., wheelchair, bedside commode, etc.): A Little   Patient Difficulty: Moving from lying on back to sitting on the side of the bed?: A Little   How much help from another person does the patient currently need. .. Help from Another: Moving to and from a bed to a chair (including a wheelchair)?: A Little   Help from Another: Need to walk in hospital room?: A Little   Help from Another: Climbing 3-5 steps with a railing?: A Lot       AM-PAC Score:  Raw Score: 17   Approx Degree of Impairment: 50.57%   Standardized Score (AM-PAC Scale): 42.13   CMS Modifier (G-Code): CK    FUNCTIONAL ABILITY STATUS  Gait Assessment   Functional Mobility/Gait Assessment  Gait Assistance: Supervision  Distance (ft): 15 x 2  Assistive Device: Rolling walker  Pattern: Within Functional Limits (waddle gait)    Skilled Therapy Provided    Bed Mobility:  Rolling: supervision    Supine<>Sit: CGA with use of bedrail   Sit<>Supine: not tested     Transfer Mobility:  Sit<>Stand: CGA   Stand<>Sit: CGA   Gait: pt amb 15 feet x 2 with RW to bathroom and back with CGA initially and progressed to close supervision. Therapist's Comments: pt received in bed and agreeable to therapy. Pt son present in room during session. Pt denies dizziness throughout session. Pt mobility as above.   Pt required CGA with supine to sit for trunk support, pt utilized bed rail. Pt reports having to use bathroom. Pt amb with RW with CGA initially for safety and progressed to close supervision. Pt was able to sit<>stand off toilet with CGA. Pt amb back to bedside chair with RW. Pt B LE elevated and pt/pt son educated on recommendation of 24 hr assist/supervision initially and HH PT. Pt/pt son are in agreement with plan. Pt left in chair, chair alarm set, son in room, and RN/SW updated. Patient End of Session: Up in chair;Needs met;Call light within reach;RN aware of session/findings; All patient questions and concerns addressed; Alarm set; Family present; Discussed recommendations with /    PT Session Time: 30 minutes  Gait Training: 10 minutes  Therapeutic Activity: 15 minutes  Therapeutic Exercise: 5 minutes

## 2023-05-08 ENCOUNTER — TELEPHONE (OUTPATIENT)
Dept: FAMILY MEDICINE CLINIC | Facility: CLINIC | Age: 63
End: 2023-05-08

## 2023-05-08 ENCOUNTER — PATIENT OUTREACH (OUTPATIENT)
Dept: CASE MANAGEMENT | Age: 63
End: 2023-05-08

## 2023-05-08 DIAGNOSIS — M54.16 LUMBAR RADICULOPATHY: ICD-10-CM

## 2023-05-08 DIAGNOSIS — Z02.9 ENCOUNTERS FOR UNSPECIFIED ADMINISTRATIVE PURPOSE: ICD-10-CM

## 2023-05-08 RX ORDER — OXYCODONE AND ACETAMINOPHEN 10; 325 MG/1; MG/1
1 TABLET ORAL EVERY 4 HOURS PRN
Qty: 45 TABLET | Refills: 0 | Status: SHIPPED | OUTPATIENT
Start: 2023-05-08 | End: 2023-05-22

## 2023-05-08 NOTE — TELEPHONE ENCOUNTER
Patient got discharged Saturday night from [facility]    She states she is not doing well (pain) and would like to speak to nurse    Please adv  Thank you

## 2023-05-08 NOTE — TELEPHONE ENCOUNTER
I sent in 10 mg this time instead of 5 and if her pain persists she HAS TO LET THEM KNOW AT [facility]

## 2023-05-08 NOTE — TELEPHONE ENCOUNTER
Spoke with patient, advised note below.  Patient v/u    Future Appointments   Date Time Provider Jennifer Stanley   5/15/2023  1:20 PM Noé Triplett, [facility] SHANTELLE Donald

## 2023-05-08 NOTE — TELEPHONE ENCOUNTER
Spoke with patient, spoke with patient having continuous pain still. Taking tizanidine 2mg 1/2 a tablet 3 times a day with no relief. Has 1 oxycodone-acetaminophen left. Looking for more pain medication as she is out not.      Please advise    Thank you

## 2023-05-10 ENCOUNTER — MED REC SCAN ONLY (OUTPATIENT)
Dept: FAMILY MEDICINE CLINIC | Facility: CLINIC | Age: 63
End: 2023-05-10

## 2023-05-15 ENCOUNTER — TELEPHONE (OUTPATIENT)
Dept: SURGERY | Facility: CLINIC | Age: 63
End: 2023-05-15

## 2023-05-15 ENCOUNTER — OFFICE VISIT (OUTPATIENT)
Dept: SURGERY | Facility: CLINIC | Age: 63
End: 2023-05-15
Payer: COMMERCIAL

## 2023-05-15 ENCOUNTER — TELEPHONE (OUTPATIENT)
Dept: FAMILY MEDICINE CLINIC | Facility: CLINIC | Age: 63
End: 2023-05-15

## 2023-05-15 VITALS
OXYGEN SATURATION: 99 % | DIASTOLIC BLOOD PRESSURE: 70 MMHG | SYSTOLIC BLOOD PRESSURE: 130 MMHG | HEART RATE: 65 BPM | BODY MASS INDEX: 48.82 KG/M2 | WEIGHT: 293 LBS | HEIGHT: 65 IN

## 2023-05-15 DIAGNOSIS — M96.1 FAILED BACK SYNDROME, LUMBAR: ICD-10-CM

## 2023-05-15 DIAGNOSIS — Q76.2 CONGENITAL SPONDYLOLISTHESIS OF LUMBAR REGION: Primary | ICD-10-CM

## 2023-05-15 DIAGNOSIS — M54.16 LUMBAR RADICULOPATHY: Primary | ICD-10-CM

## 2023-05-15 PROCEDURE — 3078F DIAST BP <80 MM HG: CPT | Performed by: NEUROLOGICAL SURGERY

## 2023-05-15 PROCEDURE — 3075F SYST BP GE 130 - 139MM HG: CPT | Performed by: NEUROLOGICAL SURGERY

## 2023-05-15 PROCEDURE — 3008F BODY MASS INDEX DOCD: CPT | Performed by: NEUROLOGICAL SURGERY

## 2023-05-15 PROCEDURE — 99213 OFFICE O/P EST LOW 20 MIN: CPT | Performed by: NEUROLOGICAL SURGERY

## 2023-05-15 RX ORDER — ALPRAZOLAM 1 MG/1
1 TABLET ORAL 3 TIMES DAILY PRN
Qty: 30 TABLET | Refills: 0 | Status: SHIPPED | OUTPATIENT
Start: 2023-05-15 | End: 2023-06-14

## 2023-05-15 RX ORDER — OXYCODONE AND ACETAMINOPHEN 10; 325 MG/1; MG/1
1 TABLET ORAL EVERY 4 HOURS PRN
Qty: 45 TABLET | Refills: 0 | Status: SHIPPED | OUTPATIENT
Start: 2023-05-15 | End: 2023-05-29

## 2023-05-15 NOTE — TELEPHONE ENCOUNTER
PT CALLED AND ADV JUST HAD PAIN INJECTION - ADV DID NOT WORK. PT IS SITTING IN OFFICE RIGHT NOW AND WANTS A CALL BACK. PT ADV GOT OUT OF HOSP THIS PAST Saturday 5/13/23.     PT HAS SOME QUESTIONS BEFORE LEAVING THE HOSPITAL    PLEASE ADV    THANK YOU

## 2023-05-15 NOTE — TELEPHONE ENCOUNTER
Pt states she is currently at Dr. Philomena White office for     Pt is scheduled for surgery about 6 weeks from now. Pt states she is not eating, she is just so worked up and she is not sleeping. She feels like she is moving backwards    Pt states surgery is in two parts- and she will be calling us back to discuss pre op testing    Pt states she is not taking her Diazapam- she ran out. She also states she is out of Alprazolam as well- but she states that helps calm her and lets her sleep    Pt would like pain medication refill as wll as refill of Alprazolam?  I can see 1mg tab has historical in her chart    Can DS diamond esend to pharmacy?

## 2023-05-15 NOTE — TELEPHONE ENCOUNTER
Pt returned call and RN spoke to her- she was advised that she is getting medication refilled    Future Appointments   Date Time Provider Jennifer Stanley   5/15/2023  1:20 PM Deepali Morfin [facility] Rhona Lyles   5/16/2023  2:30 PM Ana Cristina Villeda PA [facility] [place]     RN took pt off our schedule today- as she was just seen by pain management this morning and we will be seeing here int he next couple weeks for nargis

## 2023-05-15 NOTE — PROGRESS NOTES
Established patient:  Reason for follow up: back pain     Numeric Rating Scale: (No Pain) 0  to  10 (Worst Pain)        Pain at Present:  10/10       Distribution of Pain:    right    Most recent treatments for Current Pain Condition:   Physical Therapy  Injections   Response to treatment: some relief    New imaging or testing since your last office visit:        5.1.23 XR lower back

## 2023-05-16 ENCOUNTER — TELEPHONE (OUTPATIENT)
Dept: FAMILY MEDICINE CLINIC | Facility: CLINIC | Age: 63
End: 2023-05-16

## 2023-05-16 NOTE — TELEPHONE ENCOUNTER
Letter mailed to patient reminding them they have outstanding orders.   Lab Frequency Next Occurrence   MICROALB/CREAT RATIO, RANDOM URINE Once 09/28/2022   MRI SPINE LUMBAR (CPT=72148) Once 02/15/2023   EKG 12 Lead Once 03/17/2023   CT SPINE LUMBAR (CPT=72131) Once 05/15/2023

## 2023-05-17 ENCOUNTER — HOSPITAL ENCOUNTER (INPATIENT)
Facility: HOSPITAL | Age: 63
LOS: 8 days | Discharge: NURSING, REHAB OR LONG-TERM CARE | End: 2023-05-25
Attending: EMERGENCY MEDICINE | Admitting: HOSPITALIST
Payer: COMMERCIAL

## 2023-05-17 ENCOUNTER — TELEPHONE (OUTPATIENT)
Dept: FAMILY MEDICINE CLINIC | Facility: CLINIC | Age: 63
End: 2023-05-17

## 2023-05-17 DIAGNOSIS — R33.9 URINARY RETENTION: ICD-10-CM

## 2023-05-17 DIAGNOSIS — M54.59 INTRACTABLE LOW BACK PAIN: Primary | ICD-10-CM

## 2023-05-17 LAB
ALBUMIN SERPL-MCNC: 2.7 G/DL (ref 3.4–5)
ALBUMIN/GLOB SERPL: 0.7 {RATIO} (ref 1–2)
ALP LIVER SERPL-CCNC: 84 U/L
ALT SERPL-CCNC: 19 U/L
ANION GAP SERPL CALC-SCNC: 6 MMOL/L (ref 0–18)
AST SERPL-CCNC: 26 U/L (ref 15–37)
BASOPHILS # BLD AUTO: 0.02 X10(3) UL (ref 0–0.2)
BASOPHILS NFR BLD AUTO: 0.2 %
BILIRUB SERPL-MCNC: 0.4 MG/DL (ref 0.1–2)
BILIRUB UR QL STRIP.AUTO: NEGATIVE
BUN BLD-MCNC: 12 MG/DL (ref 7–18)
CALCIUM BLD-MCNC: 9.1 MG/DL (ref 8.5–10.1)
CHLORIDE SERPL-SCNC: 110 MMOL/L (ref 98–112)
CLARITY UR REFRACT.AUTO: CLEAR
CO2 SERPL-SCNC: 22 MMOL/L (ref 21–32)
COLOR UR AUTO: YELLOW
CREAT BLD-MCNC: 0.78 MG/DL
EOSINOPHIL # BLD AUTO: 0.18 X10(3) UL (ref 0–0.7)
EOSINOPHIL NFR BLD AUTO: 2.1 %
ERYTHROCYTE [DISTWIDTH] IN BLOOD BY AUTOMATED COUNT: 15.4 %
GFR SERPLBLD BASED ON 1.73 SQ M-ARVRAT: 85 ML/MIN/1.73M2 (ref 60–?)
GLOBULIN PLAS-MCNC: 3.8 G/DL (ref 2.8–4.4)
GLUCOSE BLD-MCNC: 125 MG/DL (ref 70–99)
GLUCOSE BLD-MCNC: 141 MG/DL (ref 70–99)
GLUCOSE BLD-MCNC: 97 MG/DL (ref 70–99)
GLUCOSE UR STRIP.AUTO-MCNC: NEGATIVE MG/DL
HCT VFR BLD AUTO: 41.7 %
HGB BLD-MCNC: 13 G/DL
IMM GRANULOCYTES # BLD AUTO: 0.03 X10(3) UL (ref 0–1)
IMM GRANULOCYTES NFR BLD: 0.4 %
KETONES UR STRIP.AUTO-MCNC: NEGATIVE MG/DL
LEUKOCYTE ESTERASE UR QL STRIP.AUTO: NEGATIVE
LYMPHOCYTES # BLD AUTO: 1.41 X10(3) UL (ref 1–4)
LYMPHOCYTES NFR BLD AUTO: 16.5 %
MCH RBC QN AUTO: 27.7 PG (ref 26–34)
MCHC RBC AUTO-ENTMCNC: 31.2 G/DL (ref 31–37)
MCV RBC AUTO: 88.7 FL
MONOCYTES # BLD AUTO: 0.83 X10(3) UL (ref 0.1–1)
MONOCYTES NFR BLD AUTO: 9.7 %
NEUTROPHILS # BLD AUTO: 6.09 X10 (3) UL (ref 1.5–7.7)
NEUTROPHILS # BLD AUTO: 6.09 X10(3) UL (ref 1.5–7.7)
NEUTROPHILS NFR BLD AUTO: 71.1 %
NITRITE UR QL STRIP.AUTO: NEGATIVE
OSMOLALITY SERPL CALC.SUM OF ELEC: 287 MOSM/KG (ref 275–295)
PH UR STRIP.AUTO: 5 [PH] (ref 5–8)
PLATELET # BLD AUTO: 324 10(3)UL (ref 150–450)
POTASSIUM SERPL-SCNC: 4.2 MMOL/L (ref 3.5–5.1)
PROT SERPL-MCNC: 6.5 G/DL (ref 6.4–8.2)
PROT UR STRIP.AUTO-MCNC: 30 MG/DL
RBC # BLD AUTO: 4.7 X10(6)UL
RBC UR QL AUTO: NEGATIVE
SODIUM SERPL-SCNC: 138 MMOL/L (ref 136–145)
SP GR UR STRIP.AUTO: 1.03 (ref 1–1.03)
UROBILINOGEN UR STRIP.AUTO-MCNC: <2 MG/DL
WBC # BLD AUTO: 8.6 X10(3) UL (ref 4–11)

## 2023-05-17 PROCEDURE — 99221 1ST HOSP IP/OBS SF/LOW 40: CPT

## 2023-05-17 PROCEDURE — 99223 1ST HOSP IP/OBS HIGH 75: CPT | Performed by: HOSPITALIST

## 2023-05-17 RX ORDER — ATORVASTATIN CALCIUM 20 MG/1
20 TABLET, FILM COATED ORAL NIGHTLY
Status: DISCONTINUED | OUTPATIENT
Start: 2023-05-17 | End: 2023-05-25

## 2023-05-17 RX ORDER — GABAPENTIN 300 MG/1
600 CAPSULE ORAL 3 TIMES DAILY
Status: DISCONTINUED | OUTPATIENT
Start: 2023-05-17 | End: 2023-05-25

## 2023-05-17 RX ORDER — DIAZEPAM 5 MG/1
5 TABLET ORAL EVERY 6 HOURS PRN
Status: DISCONTINUED | OUTPATIENT
Start: 2023-05-17 | End: 2023-05-25

## 2023-05-17 RX ORDER — BENZONATATE 100 MG/1
200 CAPSULE ORAL 3 TIMES DAILY PRN
Status: DISCONTINUED | OUTPATIENT
Start: 2023-05-17 | End: 2023-05-25

## 2023-05-17 RX ORDER — OXYCODONE AND ACETAMINOPHEN 10; 325 MG/1; MG/1
1 TABLET ORAL EVERY 4 HOURS PRN
Status: DISCONTINUED | OUTPATIENT
Start: 2023-05-17 | End: 2023-05-25

## 2023-05-17 RX ORDER — MORPHINE SULFATE 2 MG/ML
2 INJECTION, SOLUTION INTRAMUSCULAR; INTRAVENOUS EVERY 2 HOUR PRN
Status: DISCONTINUED | OUTPATIENT
Start: 2023-05-17 | End: 2023-05-25

## 2023-05-17 RX ORDER — VENLAFAXINE HYDROCHLORIDE 75 MG/1
300 CAPSULE, EXTENDED RELEASE ORAL DAILY
Status: DISCONTINUED | OUTPATIENT
Start: 2023-05-17 | End: 2023-05-25

## 2023-05-17 RX ORDER — ACETAMINOPHEN 500 MG
1000 TABLET ORAL EVERY 4 HOURS PRN
Status: DISCONTINUED | OUTPATIENT
Start: 2023-05-17 | End: 2023-05-25

## 2023-05-17 RX ORDER — PREDNISONE 20 MG/1
40 TABLET ORAL
Status: DISCONTINUED | OUTPATIENT
Start: 2023-05-17 | End: 2023-05-22

## 2023-05-17 RX ORDER — BISACODYL 10 MG
10 SUPPOSITORY, RECTAL RECTAL
Status: DISCONTINUED | OUTPATIENT
Start: 2023-05-17 | End: 2023-05-25

## 2023-05-17 RX ORDER — TAMSULOSIN HYDROCHLORIDE 0.4 MG/1
0.4 CAPSULE ORAL
Status: DISCONTINUED | OUTPATIENT
Start: 2023-05-17 | End: 2023-05-25

## 2023-05-17 RX ORDER — QUETIAPINE FUMARATE 25 MG/1
100 TABLET, FILM COATED ORAL NIGHTLY
Status: DISCONTINUED | OUTPATIENT
Start: 2023-05-17 | End: 2023-05-25

## 2023-05-17 RX ORDER — OXCARBAZEPINE 150 MG/1
150 TABLET, FILM COATED ORAL 2 TIMES DAILY
Status: DISCONTINUED | OUTPATIENT
Start: 2023-05-17 | End: 2023-05-25

## 2023-05-17 RX ORDER — PROCHLORPERAZINE EDISYLATE 5 MG/ML
5 INJECTION INTRAMUSCULAR; INTRAVENOUS EVERY 8 HOURS PRN
Status: DISCONTINUED | OUTPATIENT
Start: 2023-05-17 | End: 2023-05-25

## 2023-05-17 RX ORDER — ALBUTEROL SULFATE 2.5 MG/3ML
2.5 SOLUTION RESPIRATORY (INHALATION) 4 TIMES DAILY PRN
Status: DISCONTINUED | OUTPATIENT
Start: 2023-05-17 | End: 2023-05-25

## 2023-05-17 RX ORDER — HYDROMORPHONE HYDROCHLORIDE 1 MG/ML
0.5 INJECTION, SOLUTION INTRAMUSCULAR; INTRAVENOUS; SUBCUTANEOUS EVERY 30 MIN PRN
Status: ACTIVE | OUTPATIENT
Start: 2023-05-17 | End: 2023-05-17

## 2023-05-17 RX ORDER — ALBUTEROL SULFATE 90 UG/1
2 AEROSOL, METERED RESPIRATORY (INHALATION) EVERY 6 HOURS PRN
Status: DISCONTINUED | OUTPATIENT
Start: 2023-05-17 | End: 2023-05-25

## 2023-05-17 RX ORDER — NICOTINE POLACRILEX 4 MG
30 LOZENGE BUCCAL
Status: DISCONTINUED | OUTPATIENT
Start: 2023-05-17 | End: 2023-05-25

## 2023-05-17 RX ORDER — NICOTINE POLACRILEX 4 MG
15 LOZENGE BUCCAL
Status: DISCONTINUED | OUTPATIENT
Start: 2023-05-17 | End: 2023-05-25

## 2023-05-17 RX ORDER — DEXTROSE MONOHYDRATE 25 G/50ML
50 INJECTION, SOLUTION INTRAVENOUS
Status: DISCONTINUED | OUTPATIENT
Start: 2023-05-17 | End: 2023-05-25

## 2023-05-17 RX ORDER — POLYETHYLENE GLYCOL 3350 17 G/17G
17 POWDER, FOR SOLUTION ORAL DAILY PRN
Status: DISCONTINUED | OUTPATIENT
Start: 2023-05-17 | End: 2023-05-25

## 2023-05-17 RX ORDER — METOPROLOL SUCCINATE 25 MG/1
25 TABLET, EXTENDED RELEASE ORAL
Status: DISCONTINUED | OUTPATIENT
Start: 2023-05-17 | End: 2023-05-25

## 2023-05-17 RX ORDER — ONDANSETRON 2 MG/ML
4 INJECTION INTRAMUSCULAR; INTRAVENOUS EVERY 6 HOURS PRN
Status: DISCONTINUED | OUTPATIENT
Start: 2023-05-17 | End: 2023-05-25

## 2023-05-17 RX ORDER — MORPHINE SULFATE 4 MG/ML
4 INJECTION, SOLUTION INTRAMUSCULAR; INTRAVENOUS EVERY 2 HOUR PRN
Status: DISCONTINUED | OUTPATIENT
Start: 2023-05-17 | End: 2023-05-25

## 2023-05-17 RX ORDER — MELATONIN
3 NIGHTLY PRN
Status: DISCONTINUED | OUTPATIENT
Start: 2023-05-17 | End: 2023-05-25

## 2023-05-17 RX ORDER — ENOXAPARIN SODIUM 150 MG/ML
1 INJECTION SUBCUTANEOUS EVERY 12 HOURS SCHEDULED
Status: DISCONTINUED | OUTPATIENT
Start: 2023-05-18 | End: 2023-05-23

## 2023-05-17 RX ORDER — LAMOTRIGINE 150 MG/1
150 TABLET ORAL DAILY
Status: DISCONTINUED | OUTPATIENT
Start: 2023-05-17 | End: 2023-05-25

## 2023-05-17 RX ORDER — SENNOSIDES 8.6 MG
17.2 TABLET ORAL NIGHTLY PRN
Status: DISCONTINUED | OUTPATIENT
Start: 2023-05-17 | End: 2023-05-25

## 2023-05-17 RX ORDER — GABAPENTIN 300 MG/1
300 CAPSULE ORAL 3 TIMES DAILY
Status: DISCONTINUED | OUTPATIENT
Start: 2023-05-17 | End: 2023-05-17

## 2023-05-17 RX ORDER — HYDROMORPHONE HYDROCHLORIDE 1 MG/ML
1 INJECTION, SOLUTION INTRAMUSCULAR; INTRAVENOUS; SUBCUTANEOUS ONCE
Status: COMPLETED | OUTPATIENT
Start: 2023-05-17 | End: 2023-05-17

## 2023-05-17 RX ORDER — ENEMA 19; 7 G/133ML; G/133ML
1 ENEMA RECTAL ONCE AS NEEDED
Status: DISCONTINUED | OUTPATIENT
Start: 2023-05-17 | End: 2023-05-25

## 2023-05-17 RX ORDER — MORPHINE SULFATE 2 MG/ML
1 INJECTION, SOLUTION INTRAMUSCULAR; INTRAVENOUS EVERY 2 HOUR PRN
Status: DISCONTINUED | OUTPATIENT
Start: 2023-05-17 | End: 2023-05-25

## 2023-05-17 NOTE — PROGRESS NOTES
Patient A&Ox4, VSS on RA, , tele; NSR. Pt reports severe pain to right hip; pharmacy called to verify orders. Pt on regular diet, calderon intact and draining, voiding trial tomorrow. LBM 5/16. CMS intact; patient reports numbness and tingling to lower extremities. Plan to complete imaging, see PT/OT and manage pain. Plan of care reviewed with patient. Patient demonstrates understanding.

## 2023-05-17 NOTE — TELEPHONE ENCOUNTER
Pt is not doing good at all- she is at the hospital right now and is in the worst pain    Pt states she is having trouble urinating- and she is awaiting a catheter? She states the other hip is really hurting her- and she is at her wits end. Her pain medication is not working. Pt is having leg weakness as well- she said she was not able to stand heels, and toes, she states she was unable to do that testing. She states it is her hips and bones in her butt that are killing her    She is having trouble urinating and is now needing a straight cath to empty bladder. Pt states she is hoping to get admited for pain control- she wanted to know if Dr. Zonia Laughlin could review her chart and contact the ED to disucuss?

## 2023-05-17 NOTE — CONSULTS
Patient seen examined with nurse practitioner  Case discussed  See her note for full details  Patient presents to the ER with worsening back and leg pain  She is complaining of pain down her right leg to her big toe  Also having some pain on the left side  Patient mid for pain control  She moves all extremities well  We will speak with pain management  Recommend steroids  Recommend increasing her gabapentin  Awaiting new MRI  She is having some urinary retention, previous MRI does not show a lesion that would be causing that  Following  Spoke with hospitalist

## 2023-05-17 NOTE — ED INITIAL ASSESSMENT (HPI)
Patient reports severe Degenerative Disc disease. Saw Dr Philomena White on Friday, told she needs back surgery. Coming in today with increased right hip pain since yesterday. Pt also reports ongoing urinary retention for over a week.

## 2023-05-17 NOTE — ED QUICK NOTES
Orders for admission, patient is aware of plan and ready to go upstairs. Any questions, please call ED RN Jessica at extension [phone].      Patient Covid vaccination status: Fully vaccinated     COVID Test Ordered in ED: None    COVID Suspicion at Admission: N/A    Running Infusions:  None    Mental Status/LOC at time of transport: A&Ox3    Other pertinent information:   CIWA score: N/A   NIH score:  N/A

## 2023-05-17 NOTE — TELEPHONE ENCOUNTER
Pt called asking to speak w/ nurse immediately. Pt states she is in the hospital again and is worse since Friday.      Please call: [phone]

## 2023-05-18 ENCOUNTER — ANESTHESIA EVENT (OUTPATIENT)
Dept: MRI IMAGING | Facility: HOSPITAL | Age: 63
End: 2023-05-18
Payer: COMMERCIAL

## 2023-05-18 ENCOUNTER — ANESTHESIA (OUTPATIENT)
Dept: MRI IMAGING | Facility: HOSPITAL | Age: 63
End: 2023-05-18
Payer: COMMERCIAL

## 2023-05-18 ENCOUNTER — APPOINTMENT (OUTPATIENT)
Dept: MRI IMAGING | Facility: HOSPITAL | Age: 63
End: 2023-05-18
Attending: NEUROLOGICAL SURGERY
Payer: COMMERCIAL

## 2023-05-18 LAB
ALBUMIN SERPL-MCNC: 2.8 G/DL (ref 3.4–5)
ALBUMIN/GLOB SERPL: 0.7 {RATIO} (ref 1–2)
ALP LIVER SERPL-CCNC: 81 U/L
ALT SERPL-CCNC: 20 U/L
AMMONIA PLAS-MCNC: 14 UMOL/L (ref 11–32)
ANION GAP SERPL CALC-SCNC: 6 MMOL/L (ref 0–18)
ARTERIAL PATENCY WRIST A: POSITIVE
AST SERPL-CCNC: 23 U/L (ref 15–37)
BASE EXCESS BLDA CALC-SCNC: 1.3 MMOL/L (ref ?–2)
BASOPHILS # BLD AUTO: 0.01 X10(3) UL (ref 0–0.2)
BASOPHILS NFR BLD AUTO: 0.2 %
BILIRUB SERPL-MCNC: 0.2 MG/DL (ref 0.1–2)
BODY TEMPERATURE: 98.6 F
BUN BLD-MCNC: 14 MG/DL (ref 7–18)
CALCIUM BLD-MCNC: 9.2 MG/DL (ref 8.5–10.1)
CHLORIDE SERPL-SCNC: 108 MMOL/L (ref 98–112)
CO2 SERPL-SCNC: 25 MMOL/L (ref 21–32)
COHGB MFR BLD: 1.8 % SAT (ref 0–3)
CREAT BLD-MCNC: 0.67 MG/DL
EOSINOPHIL # BLD AUTO: 0 X10(3) UL (ref 0–0.7)
EOSINOPHIL NFR BLD AUTO: 0 %
ERYTHROCYTE [DISTWIDTH] IN BLOOD BY AUTOMATED COUNT: 15.4 %
FIO2: 28 %
GFR SERPLBLD BASED ON 1.73 SQ M-ARVRAT: 98 ML/MIN/1.73M2 (ref 60–?)
GLOBULIN PLAS-MCNC: 3.8 G/DL (ref 2.8–4.4)
GLUCOSE BLD-MCNC: 135 MG/DL (ref 70–99)
GLUCOSE BLD-MCNC: 136 MG/DL (ref 70–99)
GLUCOSE BLD-MCNC: 142 MG/DL (ref 70–99)
GLUCOSE BLD-MCNC: 154 MG/DL (ref 70–99)
GLUCOSE BLD-MCNC: 166 MG/DL (ref 70–99)
HCO3 BLDA-SCNC: 25.9 MEQ/L (ref 21–27)
HCT VFR BLD AUTO: 39.7 %
HGB BLD-MCNC: 12.2 G/DL
HGB BLD-MCNC: 12.3 G/DL
IMM GRANULOCYTES # BLD AUTO: 0.01 X10(3) UL (ref 0–1)
IMM GRANULOCYTES NFR BLD: 0.2 %
LYMPHOCYTES # BLD AUTO: 0.63 X10(3) UL (ref 1–4)
LYMPHOCYTES NFR BLD AUTO: 14 %
MAGNESIUM SERPL-MCNC: 2 MG/DL (ref 1.6–2.6)
MCH RBC QN AUTO: 27.8 PG (ref 26–34)
MCHC RBC AUTO-ENTMCNC: 31 G/DL (ref 31–37)
MCV RBC AUTO: 89.6 FL
METHGB MFR BLD: 1.1 % SAT (ref 0.4–1.5)
MONOCYTES # BLD AUTO: 0.07 X10(3) UL (ref 0.1–1)
MONOCYTES NFR BLD AUTO: 1.6 %
NEUTROPHILS # BLD AUTO: 3.79 X10 (3) UL (ref 1.5–7.7)
NEUTROPHILS # BLD AUTO: 3.79 X10(3) UL (ref 1.5–7.7)
NEUTROPHILS NFR BLD AUTO: 84 %
OSMOLALITY SERPL CALC.SUM OF ELEC: 292 MOSM/KG (ref 275–295)
OXYHGB MFR BLDA: 96.2 % (ref 92–100)
PCO2 BLDA: 44 MM HG (ref 35–45)
PH BLDA: 7.39 [PH] (ref 7.35–7.45)
PLATELET # BLD AUTO: 331 10(3)UL (ref 150–450)
PO2 BLDA: 92 MM HG (ref 80–100)
POTASSIUM SERPL-SCNC: 4.5 MMOL/L (ref 3.5–5.1)
PROT SERPL-MCNC: 6.6 G/DL (ref 6.4–8.2)
RBC # BLD AUTO: 4.43 X10(6)UL
SODIUM SERPL-SCNC: 139 MMOL/L (ref 136–145)
WBC # BLD AUTO: 4.5 X10(3) UL (ref 4–11)

## 2023-05-18 PROCEDURE — 72158 MRI LUMBAR SPINE W/O & W/DYE: CPT | Performed by: NEUROLOGICAL SURGERY

## 2023-05-18 PROCEDURE — 99231 SBSQ HOSP IP/OBS SF/LOW 25: CPT | Performed by: NEUROLOGICAL SURGERY

## 2023-05-18 PROCEDURE — 99232 SBSQ HOSP IP/OBS MODERATE 35: CPT | Performed by: INTERNAL MEDICINE

## 2023-05-18 RX ORDER — MEPERIDINE HYDROCHLORIDE 25 MG/ML
12.5 INJECTION INTRAMUSCULAR; INTRAVENOUS; SUBCUTANEOUS AS NEEDED
Status: DISCONTINUED | OUTPATIENT
Start: 2023-05-18 | End: 2023-05-18 | Stop reason: HOSPADM

## 2023-05-18 RX ORDER — MIDAZOLAM HYDROCHLORIDE 1 MG/ML
1 INJECTION INTRAMUSCULAR; INTRAVENOUS EVERY 5 MIN PRN
Status: DISCONTINUED | OUTPATIENT
Start: 2023-05-18 | End: 2023-05-18 | Stop reason: HOSPADM

## 2023-05-18 RX ORDER — HYDROCODONE BITARTRATE AND ACETAMINOPHEN 5; 325 MG/1; MG/1
1 TABLET ORAL ONCE AS NEEDED
Status: DISCONTINUED | OUTPATIENT
Start: 2023-05-18 | End: 2023-05-18 | Stop reason: HOSPADM

## 2023-05-18 RX ORDER — HYDROMORPHONE HYDROCHLORIDE 1 MG/ML
0.6 INJECTION, SOLUTION INTRAMUSCULAR; INTRAVENOUS; SUBCUTANEOUS EVERY 5 MIN PRN
Status: DISCONTINUED | OUTPATIENT
Start: 2023-05-18 | End: 2023-05-18 | Stop reason: HOSPADM

## 2023-05-18 RX ORDER — SODIUM CHLORIDE, SODIUM LACTATE, POTASSIUM CHLORIDE, CALCIUM CHLORIDE 600; 310; 30; 20 MG/100ML; MG/100ML; MG/100ML; MG/100ML
INJECTION, SOLUTION INTRAVENOUS CONTINUOUS
Status: DISCONTINUED | OUTPATIENT
Start: 2023-05-18 | End: 2023-05-18 | Stop reason: HOSPADM

## 2023-05-18 RX ORDER — LIDOCAINE HYDROCHLORIDE 10 MG/ML
INJECTION, SOLUTION EPIDURAL; INFILTRATION; INTRACAUDAL; PERINEURAL AS NEEDED
Status: DISCONTINUED | OUTPATIENT
Start: 2023-05-18 | End: 2023-05-18 | Stop reason: SURG

## 2023-05-18 RX ORDER — DIPHENHYDRAMINE HYDROCHLORIDE 50 MG/ML
12.5 INJECTION INTRAMUSCULAR; INTRAVENOUS AS NEEDED
Status: DISCONTINUED | OUTPATIENT
Start: 2023-05-18 | End: 2023-05-18 | Stop reason: HOSPADM

## 2023-05-18 RX ORDER — PROCHLORPERAZINE EDISYLATE 5 MG/ML
5 INJECTION INTRAMUSCULAR; INTRAVENOUS EVERY 8 HOURS PRN
Status: DISCONTINUED | OUTPATIENT
Start: 2023-05-18 | End: 2023-05-18 | Stop reason: HOSPADM

## 2023-05-18 RX ORDER — HYDROMORPHONE HYDROCHLORIDE 1 MG/ML
0.4 INJECTION, SOLUTION INTRAMUSCULAR; INTRAVENOUS; SUBCUTANEOUS EVERY 5 MIN PRN
Status: DISCONTINUED | OUTPATIENT
Start: 2023-05-18 | End: 2023-05-18 | Stop reason: HOSPADM

## 2023-05-18 RX ORDER — SODIUM CHLORIDE 9 MG/ML
INJECTION, SOLUTION INTRAVENOUS CONTINUOUS
Status: DISCONTINUED | OUTPATIENT
Start: 2023-05-18 | End: 2023-05-18 | Stop reason: HOSPADM

## 2023-05-18 RX ORDER — HYDROCODONE BITARTRATE AND ACETAMINOPHEN 5; 325 MG/1; MG/1
2 TABLET ORAL ONCE AS NEEDED
Status: DISCONTINUED | OUTPATIENT
Start: 2023-05-18 | End: 2023-05-18 | Stop reason: HOSPADM

## 2023-05-18 RX ORDER — NALOXONE HYDROCHLORIDE 0.4 MG/ML
80 INJECTION, SOLUTION INTRAMUSCULAR; INTRAVENOUS; SUBCUTANEOUS AS NEEDED
Status: DISCONTINUED | OUTPATIENT
Start: 2023-05-18 | End: 2023-05-18 | Stop reason: HOSPADM

## 2023-05-18 RX ORDER — MIDAZOLAM HYDROCHLORIDE 1 MG/ML
INJECTION INTRAMUSCULAR; INTRAVENOUS
Status: COMPLETED
Start: 2023-05-18 | End: 2023-05-18

## 2023-05-18 RX ORDER — HYDROMORPHONE HYDROCHLORIDE 1 MG/ML
0.2 INJECTION, SOLUTION INTRAMUSCULAR; INTRAVENOUS; SUBCUTANEOUS EVERY 5 MIN PRN
Status: DISCONTINUED | OUTPATIENT
Start: 2023-05-18 | End: 2023-05-18 | Stop reason: HOSPADM

## 2023-05-18 RX ORDER — DEXAMETHASONE SODIUM PHOSPHATE 4 MG/ML
VIAL (ML) INJECTION AS NEEDED
Status: DISCONTINUED | OUTPATIENT
Start: 2023-05-18 | End: 2023-05-18 | Stop reason: SURG

## 2023-05-18 RX ORDER — ACETAMINOPHEN 500 MG
1000 TABLET ORAL ONCE AS NEEDED
Status: DISCONTINUED | OUTPATIENT
Start: 2023-05-18 | End: 2023-05-18 | Stop reason: HOSPADM

## 2023-05-18 RX ORDER — GADOTERATE MEGLUMINE 376.9 MG/ML
20 INJECTION INTRAVENOUS
Status: COMPLETED | OUTPATIENT
Start: 2023-05-18 | End: 2023-05-18

## 2023-05-18 RX ORDER — ONDANSETRON 2 MG/ML
4 INJECTION INTRAMUSCULAR; INTRAVENOUS EVERY 6 HOURS PRN
Status: DISCONTINUED | OUTPATIENT
Start: 2023-05-18 | End: 2023-05-18 | Stop reason: HOSPADM

## 2023-05-18 RX ADMIN — SODIUM CHLORIDE: 9 INJECTION, SOLUTION INTRAVENOUS at 16:58:00

## 2023-05-18 RX ADMIN — DEXAMETHASONE SODIUM PHOSPHATE 4 MG: 4 MG/ML VIAL (ML) INJECTION at 17:01:00

## 2023-05-18 RX ADMIN — ONDANSETRON 4 MG: 2 INJECTION INTRAMUSCULAR; INTRAVENOUS at 18:20:00

## 2023-05-18 RX ADMIN — LIDOCAINE HYDROCHLORIDE 50 MG: 10 INJECTION, SOLUTION EPIDURAL; INFILTRATION; INTRACAUDAL; PERINEURAL at 17:01:00

## 2023-05-18 NOTE — PHYSICAL THERAPY NOTE
Following pt for PT eval.  Noted MRI pending, pt lethargic this AM.  Will await further imaging prior to PT. D/w RN, will f/u for PT at a later date.

## 2023-05-18 NOTE — PLAN OF CARE
A&Ox4, lethargic. VSS. CPAP, 2L O2. . Telemetry monitoring. SCDs on BLE. Ankle pumps encouraged. Tolerating diet. Castellanos in place. Log rolling. Awaiting MRI, PT/OT eval. Psych eval. Patient updated on plan of care. Safety precautions maintained. Call light within reach.

## 2023-05-18 NOTE — SIGNIFICANT EVENT
RRT    *See RRT Documentation Record*    Reason the RRT was called: Lethargic, hard to arouse  Assessment of patient leading up to RRT: Hard to arouse, unable to maintain conversation, slurred speech  Interventions/Testing: Accucheck, vitals  Patient Outcome/Disposition: Vitals normal, Glucose normal, Patient lethargic but became more alert, no need imaging per Dr. Veronica Aguillon, continue to monitor per Dr Wenceslao Driscoll Notified: no  Name of family notified: Reid Servin

## 2023-05-18 NOTE — ANESTHESIA POSTPROCEDURE EVALUATION
[facility]    Lucas Arms Patient Status:  Inpatient   Age/Gender 60year old female MRN [id number]   [facility] 3SW-A Attending Hassel Goldberg, MD   [facility] Day # 1 PCP Eli Cantor DO       Anesthesia Post-op Note        Procedure Summary     Date: 05/18/23 Room / Location: [facility] MRI    Anesthesia Start: 8296 Anesthesia Stop:     Procedure: MRI SPINE LUMBAR (W+WO) (CPT=72158) Diagnosis: (Low back pain; Neurologic deficit, non-traumatic; Bladder/bowel dysfunction; No new bowel/bladder dysfunction; No chronic or history of bowel/bladder dysfunction; No known/automatically detected potential contraindications to imaging)    Scheduled Providers:  Anesthesiologist: Tanya Guzman MD    Anesthesia Type: general ASA Status: 3          Anesthesia Type: general    Vitals Value Taken Time   /71 05/18/23 1841   Temp 98.4 05/18/23 1841   Pulse 70 05/18/23 1841   Resp 16 05/18/23 1841   SpO2 98 05/18/23 1841       Patient Location: PACU    Anesthesia Type: general    Airway Patency: patent    Postop Pain Control: adequate    Mental Status: mildly sedated but able to meaningfully participate in the post-anesthesia evaluation    Nausea/Vomiting: none    Cardiopulmonary/Hydration status: stable euvolemic    Complications: no apparent anesthesia related complications    Postop vital signs: stable    Dental Exam: Unchanged from Preop    Patient to be discharged from PACU when criteria met.

## 2023-05-18 NOTE — OCCUPATIONAL THERAPY NOTE
Following pt for OT eval.  Noted MRI pending, pt lethargic this AM.  Will await further imaging prior to OT.   Follow up for OT at a later date

## 2023-05-18 NOTE — PROGRESS NOTES
Attempted to see patient  See PA note for full details  Patient sleeping  Await MRI  Spoke with pain management, no intervention from their standpoint  Recommend p.o. pain meds

## 2023-05-18 NOTE — ANESTHESIA PROCEDURE NOTES
Airway  Date/Time: 5/18/2023 5:06 PM  Urgency: elective      General Information and Staff    Patient location during procedure: OR  Anesthesiologist: Florence Jennings MD  Performed: anesthesiologist   Performed by: Florenec Jennings MD  Authorized by: Florence Jennings MD      Indications and Patient Condition  Indications for airway management: anesthesia  Sedation level: deep  Preoxygenated: yes  Patient position: sniffing  Mask difficulty assessment: 0 - not attempted    Final Airway Details  Final airway type: supraglottic airway      Successful airway: classic  Size 4       Number of attempts at approach: 1

## 2023-05-19 LAB
GLUCOSE BLD-MCNC: 144 MG/DL (ref 70–99)
GLUCOSE BLD-MCNC: 156 MG/DL (ref 70–99)
GLUCOSE BLD-MCNC: 157 MG/DL (ref 70–99)
GLUCOSE BLD-MCNC: 171 MG/DL (ref 70–99)

## 2023-05-19 PROCEDURE — 99231 SBSQ HOSP IP/OBS SF/LOW 25: CPT | Performed by: NEUROLOGICAL SURGERY

## 2023-05-19 PROCEDURE — 99232 SBSQ HOSP IP/OBS MODERATE 35: CPT | Performed by: INTERNAL MEDICINE

## 2023-05-19 NOTE — CM/SW NOTE
Department  notified of request for [facility], aidin referrals started. Assigned CM/SW to follow up with pt/family on further discharge planning. Evert Arita   May 19, 2023   15:49       Department  notified of request for KAY, aidin referrals started. Assigned CM/SW to follow up with pt/family on further discharge planning.

## 2023-05-19 NOTE — PROGRESS NOTES
23 1231   Over the last 2 weeks, how often have you been bothered by any of the following problems? Little interest or pleasure in doing things 1   Feeling down, depressed, or hopeless 1   Trouble falling or staying asleep, or sleeping too much 1   Feeling tired or having little energy 1   Poor appetite or overeating 0   Feeling bad about yourself - or that you are a failure or have let yourself or your family down 0   Trouble concentrating on things, such as reading the newspaper or watching television 2   Moving or speaking so slowly that other people could have noticed. Or the opposite - being so fidgety or restless that you have been moving around a lot more than usual 0   Thoughts that you would be better off dead, or of hurting yourself in some way 0   PHQ-9 TOTAL SCORE 6   If you checked off any problems, how difficult have these problems made it for you to do your work, take care of things at home, or get along with other people? Not difficult at all     [address] [facility] Resource Referral Counselor Note    Christen Garcia Patient Status:  Inpatient    10/20/1960 MRN [id number]   [facility] 3SW-A Attending Jose Wick MD   Hosp Day # 2 PCP DO GORDY Kelly(subjective) Patient admits to having a history of anxiety and depression. She said, she currently is seeing a psychiatrist.  She couldn't remember the name but in this writer's note from 23 she reported seeing Psychiatrist, Dr. Jose Villagran. The patient said, she use to see a psychotherapist but stopped going. She said, she needs to start seeing someone again. The patient said, she feels better now then she has in the past.  She feels stable rights now. Tatiana Lizama denies any suicidal thoughts. O(objective) The patient is alert and oriented x4. She was hyper verbal and having difficulty keeping on the topic of the questions being asked to her.   Also would start answering the questions but would give a long history for the answers. Patient very pleasant and willing to answer the questions. A(assessment) The phq9 was completed. P(plan) Talked to Dr. Gayatri Adams and clinical of the patient. He said, she doesn't need to see her and for her to keep her appointment with Dr. Fiorella Simon for medication management. Referrals will be placed in the discharge summary for the patient to follow up with a psychotherapist for counseling.       Leslie Stinson RN  5/19/2023  12:32 PM

## 2023-05-19 NOTE — PLAN OF CARE
A&Ox 4. VSS. CPAP at night. . Telemetry monitoring. SCDs on BLE. Ankle pumps encouraged. Tolerating diet. Last BM 5/16. Castellanos in place. MRI complete. Awaiting recommendation. Safety precautions maintained. Call light within reach.

## 2023-05-19 NOTE — DISCHARGE INSTRUCTIONS
Psychiatry-  Keep your appointment with your own Psychiatrist.  Here are referrals for you to follow up with a psychotherapist for counseling. Lita Blanco   Psychologist [facility])   [address], [place], [place], [address]   [phone] [phone]: [address]   Therapist ([address] [facility])   [address] [facility], [address]   [phone] [phone]: [address] [facility]   Therapist ([address])   [address] [place], [place], [address]   [phone] [phone]: [address] ([address])   [address] [facility], [address]   [phone]: [place] [address] [address], [place], [place], [address]   [phone] [phone]:     Sometimes managing your health at home requires assistance. The [facility] team has recognized your preference to use [facility]. They can be reached by phone at [phone]. The fax number for your reference is [phone]. A representative from the home health agency will contact you or your family to schedule your first visit.

## 2023-05-19 NOTE — PROGRESS NOTES
Occupational Therapy    Orders received and chart review completed. MRI showed disc extrusion. Awaiting NSGY recommendations and will initiate therapy as clinically appropriate.

## 2023-05-20 LAB
GLUCOSE BLD-MCNC: 140 MG/DL (ref 70–99)
GLUCOSE BLD-MCNC: 144 MG/DL (ref 70–99)
GLUCOSE BLD-MCNC: 160 MG/DL (ref 70–99)
GLUCOSE BLD-MCNC: 250 MG/DL (ref 70–99)

## 2023-05-20 PROCEDURE — 99232 SBSQ HOSP IP/OBS MODERATE 35: CPT | Performed by: INTERNAL MEDICINE

## 2023-05-20 NOTE — PLAN OF CARE
Pt is alert and oriented x4, VSS, pt c/o moderate back pain, room air, IV saline locked, voiding freely in bathroom, up 2 assist with walker, PO meds for pain see MAR, all safety measures in place, call light within reach. Will continue to monitor.

## 2023-05-20 NOTE — PLAN OF CARE
Patient is alert and oriented x4. Room air. ADRIANA with CPAP. Tele monitoring. Lovenox for anticoagulation. Cardiac electrolyte protocol. Up with 2 assist and walker. Glasses at bedside. Accucheck. Plan is to be evaluated by Dr. Faisal Farah 5/22 to determine plan of care.

## 2023-05-21 LAB
GLUCOSE BLD-MCNC: 107 MG/DL (ref 70–99)
GLUCOSE BLD-MCNC: 137 MG/DL (ref 70–99)
GLUCOSE BLD-MCNC: 168 MG/DL (ref 70–99)
GLUCOSE BLD-MCNC: 250 MG/DL (ref 70–99)
GLUCOSE BLD-MCNC: 99 MG/DL (ref 70–99)

## 2023-05-21 PROCEDURE — 99232 SBSQ HOSP IP/OBS MODERATE 35: CPT | Performed by: INTERNAL MEDICINE

## 2023-05-21 PROCEDURE — 99231 SBSQ HOSP IP/OBS SF/LOW 25: CPT | Performed by: NEUROLOGICAL SURGERY

## 2023-05-21 NOTE — PLAN OF CARE
Patient is alert and oriented x4. Room air. ADRIANA with CPAP. Tele monitoring. Lovenox for anticoagulation. Cardiac electrolyte protocol. Up with 2 assist and walker. Glasses at bedside. Regular diet with accucheck. Plan is to be evaluated by Dr. Fraga Orn 5/22 to determine plan of care.

## 2023-05-21 NOTE — PLAN OF CARE
Assumed care of pt at 1. Pt AOx4, saturating well on RA, VSS. Pt up with 2 assist and walker. Plan is for ortho to see pt Monday or Tuesday for possible surgery. Pt updated on POC. Questions answered and needs met.

## 2023-05-21 NOTE — CM/SW NOTE
CM met with pt at bedside to discuss discharge planning. Pt states that she lives at home alone, but has a supportive adult son that lives nearby. He may move in with her soon, therefore she may have additional support. Prior to admission, she was independent with ADLs with use of a RW. She did not have any supportive services in place. Pt feels that she is successful at home when her pain is manageable. Discussed PT's recommendations for KAY and she inquired about [facility]. Informed pt about the difference between acute and subacute rehabs. She is agreeable and would like to go to a facility near where she lives in [facility] if possible. She is also open to facilities near [facility]. KAY referral is open and pending at this time. Dr. Kaden Zimmermanted with NS to evaluate pt tomorrow to determine need for surgery before discharge. Pt will need PASRR and insurance auth for KAY. CM/SW to remain available for discharge planning.     SALMA JarvisN, RN-BC    [id number]

## 2023-05-22 LAB
GLUCOSE BLD-MCNC: 139 MG/DL (ref 70–99)
GLUCOSE BLD-MCNC: 139 MG/DL (ref 70–99)
GLUCOSE BLD-MCNC: 171 MG/DL (ref 70–99)
GLUCOSE BLD-MCNC: 94 MG/DL (ref 70–99)

## 2023-05-22 PROCEDURE — 99232 SBSQ HOSP IP/OBS MODERATE 35: CPT | Performed by: NEUROLOGICAL SURGERY

## 2023-05-22 PROCEDURE — 99231 SBSQ HOSP IP/OBS SF/LOW 25: CPT

## 2023-05-22 PROCEDURE — 99232 SBSQ HOSP IP/OBS MODERATE 35: CPT | Performed by: INTERNAL MEDICINE

## 2023-05-22 RX ORDER — FUROSEMIDE 10 MG/ML
20 INJECTION INTRAMUSCULAR; INTRAVENOUS ONCE
Status: COMPLETED | OUTPATIENT
Start: 2023-05-22 | End: 2023-05-22

## 2023-05-22 NOTE — TELEPHONE ENCOUNTER
You are scheduled for L4-S1 ALIF; L2-LF lateral interbody fusion on 7.11.23 with Hilario Wang and Dr Kady Quinn at [facility].     PCP clearance is needed. We have faxed a request for pre-op clearance to your PCP Dr Ariadna Kohler. Please contact their office for appointment. You will need  pre operative labs which will include MRSA/MSSA testing. You will need to contact the Pre-admission department at [phone] to schedule an appointment for your labs. The Pre-Admission nurse will review your health history and give you information from the hospital. The nurse will also get you scheduled for all your pre-op testing required for your surgery. Do not take any blood thinning medications such as over the counter non-steroidal antiinflammatories (advil, aleve, ibuprofen etc.), herbal supplements (garlic,tumeric etc.), vitamin E, fish oil or krill oil for at least 7 days prior to surgery. You may only take Tylenol, Extra Strength Tylenol, Arthritis Tylenol, or prescription Norco or Tramadol for pain if something is needed. You should have nothing to eat or drink after 11:00pm the night prior to surgery except for the following:    Do drink 12 ounces of regular Gatorade (NOT RED) 12 hours and 4 hours prior to your scheduled surgery time, Do not drink any other liquids (including water) before your surgery. Do not chew gum or eat candies before surgery. Take 1000 mg of Tylenol (Acetaminophen) 4 hours before your scheduled surgery time, take this with your scheduled Gatorade. In order to prevent infection, you will need to purchase Hibiclens soap and use it after your regular body soap for 5 days prior to your procedure. The last shower should be the night before surgery. This soap can be found at any pharmacy in the first aid section. See detailed instructions below. Our office will get prior authorization for surgery through your insurance. Surgery is usually scheduled as a 3-5 day admission.   This is an estimate and varies from person to person. Ultimately, the surgeon will determine when you are ready to be discharged. The hospital will contact you 1-2 days before surgery with your arrival time. If you were on blood thinners (such as Coumadin, Pradaxa, Xarelto, etc) prior to surgery that we had you stop for surgery, please make sure you get instructions about when to resume the medication before you are discharged from the hospital.    Per Dr. Kade Pelayo surgery protocol your appointments are as follows:    1 week post-op appointment scheduled on 7.21.23 at 11:30 am with CINDY Preicado.    4 week post-op appointment scheduled on 8.14.23 at 1:00 pm with Dr Shelia Brand    3 month post surgery appointment scheduled on 10. 9.23 at 1:00 pm with Dr. Shelia Brand    Restrictions for after surgery:  Lifting restriction of 10 pounds or less. No bending or twisting. No prolonged sitting or standing. Driving is restricted for the first 1-2 weeks (this will vary from surgeon to surgeon.)  Fusion patients may require bracing such as TLSO or LSO brace. Braces are custom made to fit the patient. Patient's are to keep their incisions covered for the first 3 days post surgery. After that they may leave the incision open to air. It is better for the healing process if the incision is left open to air. May shower after the third day. Do not scrub the incision and avoid any lotions or creams to the incision. Please make sure to arrive at least 15 minutes prior to your scheduled appointment in order to get checked in and roomed in a timely manner. Depending on provider availability, late patients may be required to reschedule. REFILLS:  After surgery, please remember that we do have a 48 hour refill policy that does not include weekends, please make sure to request your medications in a timely manner so that you do not go days without medication.   *Refills should be requested through your pharmacy or through the refill request in mychart (log in, go to medications, then select refill request). MYCHART MESSAGING:  Please remember that our office is closed during the weekend and no one is available for [facility]. If you have an urgent or emergent matter please go to a walk-in center or the emergency room. Also please remember your [employer] messages are part of your legal medical record and should not be utilized as a personal email with our providers as it is visible to all [employer] employees. Also, Since [employer] messages are not for emergent matters it may be several days before there is a response to your message. FMLA/PAPERWORK:  If you require FMLA paperwork for your surgery, please make sure to either Drop it off or have it faxed to our office at [phone]. Make sure it has your NAME, , and has your signature. If your signature is not on the form you will need to have a Release of Information on file. To facilitate this process we ask that you requested it at the  on your way out and sign it. Without a signed ADDISON or signature on the form we will not be able to fax it and this will cause a delay with your forms. **We do have a 2 week policy for all forms and paperwork, please make sure to allow plenty of time for completion. Same day paperwork will not be completed. **    Regarding current visitor information:    Please visit the following website for the detailed and up-to-date visitor screening and restriction policy at [address] [place] [address].. When speaking with Pre-admissions you will be told about a spine class as it relates to your surgery. Although the class is not mandatory, you are strongly encouraged to attend. Make sure to bring your surgical binder to the class     The Pre-op Spine Surgery Class is held at [facility] most  from 4:00-5:00 pm.  Call Pre-admission Testing (PAT) to register at:  [phone].      Please park in the [place] [facility] garage, check in at registration and meet by the fish tank in the Goodreads for your escort to class on the Ortho Spine unit. If unable to attend, class is available online at [email or web address]. Please call the Care Coordinator, Brennen Lam, with any questions, at [phone]    If you have any questions or concerns please contact our office at [phone] #1 or via buySAFE message. Hibiclens Bathing  Hibiclens is a body soap that is used before surgery protect you from getting an infection after surgery   Hibiclens comes in a large blue bottle and can be found in most pharmacies in the [address] with this daily for FIVE consecutive days before surgery, using the entire bottle over the five days. The last shower should be the night before surgery. Steps to bathing with Hibiclens  Do not use Hibiclens on your hair, face or private areas  Wash your hair and face as normal with your usual cleansers  Rinse well  Using a clean wet washcloth apply enough Hibiclens to cover your body. Wash from the neck down avoiding the genital areas and concentrating on the surgical area  Rinse well  Dry yourself with a clean, dry towel  Do not use any powders, creams, lotions or sprays on your body as these attract bacteria  Deodorant and facial creams are acceptable. (Laundering/cleaning: Chlorhexidine gluconate skin cleansers will cause stains if used with chlorine releasing products.  Rinse completely and use only non-chlorine detergents.)    For Office Use Only:    Medical Clearances Needed: PCP  PA: LAMIN COTTO  CPT Codes:

## 2023-05-22 NOTE — CM/SW NOTE
CM completed PASRR Level 1 Screen- queued for review    / to remain available for support and/or discharge planning.      Hua Torres MBA MSN, RN CTL/  [id number]

## 2023-05-22 NOTE — CM/SW NOTE
05/22/23 0288   Choice of Post-Acute Provider   Informed patient of right to choose their preferred provider Yes   List of appropriate post-acute services provided to patient/family with quality data Yes   Information given to Patient         PT/OT recommending [facility]. Met with pt and provided AIDIN list of accepting facilities. Pt to review options to confirm preferred facility. Insurance [employer] will be needed. Await pt's choice for further DC planning. / to remain available for support and/or discharge planning.      Srinivas Veliz LCSW  Discharge Planner  [phone]

## 2023-05-22 NOTE — PLAN OF CARE
Alert and oriented x4. VSS. On RA. . ADRIANA with CPAP. Telemetry monitoring. Tolerating diet. Denies pain at this time. Up with 2 assist and walker. Plan is to be evaluated by Dr. Philomena White 5/22 to determine plan of care. Call light within reach.

## 2023-05-22 NOTE — HOME CARE LIAISON
Received referral via [facility] for Home Health services. Spoke w/ patient at the bedside  and provided with list of [place] [address] providers [facility] [address], choice is [address] . Agency reserved in [address] [place], [place] [address] Road and contact information placed on AVS.Financial interest disclosure provided.  Notified Yazmin Tomlinson
no

## 2023-05-23 LAB
ANION GAP SERPL CALC-SCNC: 4 MMOL/L (ref 0–18)
BUN BLD-MCNC: 19 MG/DL (ref 7–18)
CALCIUM BLD-MCNC: 8.6 MG/DL (ref 8.5–10.1)
CHLORIDE SERPL-SCNC: 103 MMOL/L (ref 98–112)
CO2 SERPL-SCNC: 31 MMOL/L (ref 21–32)
CREAT BLD-MCNC: 0.98 MG/DL
GFR SERPLBLD BASED ON 1.73 SQ M-ARVRAT: 65 ML/MIN/1.73M2 (ref 60–?)
GLUCOSE BLD-MCNC: 131 MG/DL (ref 70–99)
GLUCOSE BLD-MCNC: 136 MG/DL (ref 70–99)
GLUCOSE BLD-MCNC: 136 MG/DL (ref 70–99)
GLUCOSE BLD-MCNC: 184 MG/DL (ref 70–99)
GLUCOSE BLD-MCNC: 81 MG/DL (ref 70–99)
OSMOLALITY SERPL CALC.SUM OF ELEC: 290 MOSM/KG (ref 275–295)
POTASSIUM SERPL-SCNC: 4.5 MMOL/L (ref 3.5–5.1)
SODIUM SERPL-SCNC: 138 MMOL/L (ref 136–145)

## 2023-05-23 PROCEDURE — 99232 SBSQ HOSP IP/OBS MODERATE 35: CPT | Performed by: INTERNAL MEDICINE

## 2023-05-23 RX ORDER — OXYCODONE HYDROCHLORIDE AND ACETAMINOPHEN 5; 325 MG/1; MG/1
1-2 TABLET ORAL EVERY 6 HOURS PRN
Qty: 30 TABLET | Refills: 0 | Status: SHIPPED | OUTPATIENT
Start: 2023-05-23 | End: 2023-06-22

## 2023-05-23 RX ORDER — FUROSEMIDE 20 MG/1
20 TABLET ORAL DAILY
Status: DISCONTINUED | OUTPATIENT
Start: 2023-05-23 | End: 2023-05-25

## 2023-05-23 RX ORDER — GABAPENTIN 300 MG/1
600 CAPSULE ORAL 3 TIMES DAILY
Qty: 90 CAPSULE | Refills: 2 | Status: SHIPPED | OUTPATIENT
Start: 2023-05-23

## 2023-05-23 RX ORDER — QUETIAPINE FUMARATE 50 MG/1
100 TABLET, FILM COATED ORAL NIGHTLY
Status: SHIPPED | COMMUNITY
Start: 2023-05-23

## 2023-05-23 RX ORDER — DIAZEPAM 5 MG/1
5 TABLET ORAL EVERY 6 HOURS PRN
Qty: 20 TABLET | Refills: 0 | Status: SHIPPED | OUTPATIENT
Start: 2023-05-23 | End: 2023-06-22

## 2023-05-23 RX ORDER — PREDNISONE 20 MG/1
20 TABLET ORAL
Status: DISCONTINUED | OUTPATIENT
Start: 2023-05-24 | End: 2023-05-25

## 2023-05-23 RX ORDER — NALOXONE HYDROCHLORIDE 4 MG/.1ML
4 SPRAY NASAL AS NEEDED
Qty: 1 KIT | Refills: 0 | Status: SHIPPED | OUTPATIENT
Start: 2023-05-23

## 2023-05-23 NOTE — PLAN OF CARE
Alert and oriented x4. VSS. On RA. . ADRIANA with CPAP. Telemetry monitoring. Tolerating diet. Voiding freely. Up with 2 assist and walker. Plan to dc to [facility] when medically cleared, pending placement. Call light within reach.

## 2023-05-23 NOTE — PLAN OF CARE
Pt Aox4. RA . Tele NSR. ADRIANA with CPAP. Pt reports consistent 8/10 pain radiating down right leg. Medicated with Percocet for pain with some relief. Pt in bed resting comfortably. Max x2 assist to get OOB. Voiding without difficulty, adequate amounts.  Plan to go to [facility] at DC

## 2023-05-23 NOTE — CM/SW NOTE
Met with pt to discuss DC planning. Pt is medically ready for DC. Pt stated she has chosen [facility] [employer]. Facility reserved in [address]. Message sent asking them to request insurance auth. Message also left for liaison Johanny Alvarado ([phone]). PASRR still in review. Await insurance auth for DC. / to remain available for support and/or discharge planning.      Dominic Jacobson, [facility]  Discharge Planner  [phone]

## 2023-05-24 LAB
GLUCOSE BLD-MCNC: 108 MG/DL (ref 70–99)
GLUCOSE BLD-MCNC: 135 MG/DL (ref 70–99)
GLUCOSE BLD-MCNC: 234 MG/DL (ref 70–99)
GLUCOSE BLD-MCNC: 90 MG/DL (ref 70–99)

## 2023-05-24 PROCEDURE — 99232 SBSQ HOSP IP/OBS MODERATE 35: CPT | Performed by: HOSPITALIST

## 2023-05-24 NOTE — CM/SW NOTE
Spoke with Jim from [facility] ([phone]) who confirmed they have received insurance auth and have bed available today. They have ordered bipap and anticipate DME to arrive tomorrow. Paged MD to determine if pt will need bipap in place at [facility] prior to DC. Await response. / to remain available for support and/or discharge planning. [facility]  [phone]    Brigida Frey [facility]  Discharge Planner  [phone]    Addendum:  Spoke with Dr Samson Saxena who is agreeable with DC today and pt to go without bipap until facility can obtain. Updated Brittany from [facility] who stated her admin informed her they cannot accept pt until bipap is available for pt's use at the facility. Anticipate DC tomorrow once bipap arrives. Medicar placed on will call for tomorrow. Updated RN and pt at bedside.  Message sent to MD.

## 2023-05-24 NOTE — PROGRESS NOTES
05/24/23 0831   Clinical Encounter Type   Visited With Patient   Routine Visit Introduction   Referral From Other (Comment)   Referral To    Taxonomy   Intended Effects Build relationship of care and support   Methods Encourage self care;Encourage story-telling; Offer spiritual/Yazidism support; Offer emotional support   Interventions Acknowledge response to difficult experience; Active listening; Ask guided questions   Trigger for Consult   Trigger for Spiritual Care Consult No      responded to the spiritual care 7 day visitation list. Patient laying in bed in discomfort. Nurse provided support and medication during visit.  provided active listening as patient describe her experience with her illness. Patient has family support (son). Patient appears anxious concerning diagnose and operation in the next few months.  encouraged patient to request spiritual support if needed. Spiritual Care support can be requested via an Epic consult.        [address]

## 2023-05-24 NOTE — PLAN OF CARE
A/o x4. RA/. ADRIANA/CPAP. Tele:NSR. 1800 ada with qid accuchecks. Voiding without difficulty. Up x2 stand pivot to bsc. LBM 5/23. Plan to pending insurance auth for [facility] rehab facility. POC updated with pt. All safety measures in place. Call light within reach instructed pt to call for help or assistance.

## 2023-05-24 NOTE — CM/SW NOTE
Insurance auth pending for [employer]. [facility] asking if pt has her own bipap to bring to [facility] facility. Met with pt who stated she does not have bipap or cpap for home as she has been unable to complete sleep study. She would like to use pap at [facility] if available. Discussed need for pt to complete sleep study after [facility] in order to have device for home. Pt agreeable. Updated rehab facility via [address]. Pt will need to bring mask/tubing that she has been [place]. / to remain available for support and/or discharge planning.      Sylvester Blum LCSW  Discharge Planner  [phone]

## 2023-05-25 VITALS
SYSTOLIC BLOOD PRESSURE: 109 MMHG | HEIGHT: 65 IN | WEIGHT: 293 LBS | OXYGEN SATURATION: 97 % | RESPIRATION RATE: 18 BRPM | DIASTOLIC BLOOD PRESSURE: 47 MMHG | HEART RATE: 67 BPM | BODY MASS INDEX: 48.82 KG/M2 | TEMPERATURE: 97 F

## 2023-05-25 LAB
GLUCOSE BLD-MCNC: 117 MG/DL (ref 70–99)
GLUCOSE BLD-MCNC: 85 MG/DL (ref 70–99)

## 2023-05-25 PROCEDURE — 99239 HOSP IP/OBS DSCHRG MGMT >30: CPT | Performed by: HOSPITALIST

## 2023-05-25 RX ORDER — TAMSULOSIN HYDROCHLORIDE 0.4 MG/1
0.4 CAPSULE ORAL DAILY
Qty: 10 CAPSULE | Refills: 0 | Status: SHIPPED | OUTPATIENT
Start: 2023-05-25 | End: 2023-06-24

## 2023-05-25 RX ORDER — ALPRAZOLAM 1 MG/1
TABLET ORAL
Qty: 30 TABLET | Refills: 0 | Status: SHIPPED | OUTPATIENT
Start: 2023-05-25

## 2023-05-25 RX ORDER — PREDNISONE 10 MG/1
10 TABLET ORAL DAILY
Qty: 3 TABLET | Refills: 0 | Status: SHIPPED | OUTPATIENT
Start: 2023-05-26 | End: 2023-05-29

## 2023-05-25 NOTE — TELEPHONE ENCOUNTER
LOV 03/18/23  Last refill on 05/15/23, for #30 tabs, with 0 refills  ALPRAZolam 1 MG Oral Tab    Future Appointments   Date Time Provider Jennifer Stanley   6/5/2023  1:40 PM DO FERNANDO [facility]     Order(s) pending, please review. Thank you.

## 2023-05-25 NOTE — PROGRESS NOTES
NURSING DISCHARGE NOTE    Discharged Rehab facility via Ambulance. Accompanied by Support staff  Belongings Taken by patient/family. Cleared for discharge by hospitalist and ok for DC from neurosurg. Patient educated on transfer information [facility]. All questions answered. Report called to Alphonse Mishra at [facility]. All questions were answered. [facility] Pt left with all paperwork, prescriptions and belongings.

## 2023-05-25 NOTE — CM/SW NOTE
Contacted [facility] and informed that they will receive bipap today before 8pm.  Pt can be accepted for admission today. Ambulance transport scheduled for 1230pm.  PCS form updated. Updated pt's RN. No further needs at this time. / to remain available for support and/or discharge planning.      [facility]  [phone]    [facility]  [address], [facility]  Discharge Planner  [phone]

## 2023-05-25 NOTE — PLAN OF CARE
Patient alert and oriented x4. VSS on RA. ADRIANA w CPAP. Pain controlled with PRN PO pain meds. Up x2 with the walker to commode. Voiding freely in commode. SCDs in plae. Ankle pumps encouraged. IS encouraged. Tolerating diet, no c/o n/v.     Plan: discharge to [facility] via ambulance at 1330. Bipap to be delivered to [facility] by [facility] per social work. Discussed transport to [facility] time with [facility]. [facility] notified of [phone] leaving time by [facility].

## 2023-05-25 NOTE — PROGRESS NOTES
Ambulance  changed to 1330, AVS reviewed & printed out, AVS & scripts on chart, IV to be dc'd per [facility].

## 2023-05-25 NOTE — PLAN OF CARE
Patient alert and oriented x4. VSS on RA. Pain controlled with PO PRN pain meds. Denies n/t. SCDs in place. Ankle pumps encouraged. Pt up x2 with the walker. Tolerating diet no c/o n/v. Voiding freely in commode. Pt updated on POC.     Plan: discharge to [address] with medicar when Bipap is delivered

## 2023-05-25 NOTE — PLAN OF CARE
Patient A&Ox4 . Blood pressure runs in the low 100's . Tele with sinus rhythm . Using CPAP at night ,o2 sats in the 90's . Up to commode with 2 assist . Had BM , voiding well . Pain is tolerable with scheduled meds . Has on and off right leg pain . All safety precautions in place . Plan for [facility] tomorrow . Will continue to monitor .

## 2023-05-26 RX ORDER — ALPRAZOLAM 1 MG/1
TABLET ORAL
Qty: 30 TABLET | Refills: 0 | OUTPATIENT
Start: 2023-05-26

## 2023-06-02 ENCOUNTER — TELEPHONE (OUTPATIENT)
Dept: SURGERY | Facility: CLINIC | Age: 63
End: 2023-06-02

## 2023-06-05 ENCOUNTER — TELEPHONE (OUTPATIENT)
Dept: SURGERY | Facility: CLINIC | Age: 63
End: 2023-06-05

## 2023-06-05 NOTE — TELEPHONE ENCOUNTER
Surgical Case Request Placed for 7/11/2023 at 0730 with Dr. Kaden Michaels and Dr. Fuller Going    CPT Codes: 88234, 27022, 38304, 16991 Usha Suarez), [id number], 40698, [place] [facility] has been placed. Placed on Dr. Kaden Michaels desk for review and signature. Please fax the following information to [phone]     DME Order  Face sheet  Insurance card copy  Last office visit note  Contact: Corrina Bowman at [phone] to verify order is received. [address] DME Order has been placed    Please fax the following information to [phone]     DME Order  Face sheet  Insurance card copy  Last office visit note    Contact: Emma Carver at [phone] to verify order is received.

## 2023-06-05 NOTE — TELEPHONE ENCOUNTER
Spoke with patient regarding today's f/u appointment with Dr. Tonie Darby - she is agreeable to cancelling today's appointment as the plan of care has not changed since [address] with Dr. Tonie Darby on 5/15/23. She has no new complaints at this time. She is still at [facility]. Pt was appreciative of the call.

## 2023-06-05 NOTE — TELEPHONE ENCOUNTER
Discussed with Dr. Rancho Aceves - patient is still at rehab, plan has not changed since hospital discharge so we can cancel today's f/u appointment with Dr. Rancho Aceves at 1:40 pm. LVM for patient to notify. Can we make another attempt to reach patient to notify her that she does not need to come to today's appointment? Thank you!

## 2023-06-07 NOTE — TELEPHONE ENCOUNTER
Stage 2 of the surgical plan Surgical Case Request Placed in a Separate TE.        \"Posterior L2-S1 decompression & fusion    Surgical Case Request Placed for 07/13/2023 at [facility]    CPT Codes: 41348, 24080 (x3), 06996, 57100 (x4), 49092, 52615\"

## 2023-06-08 ENCOUNTER — OFFICE VISIT (OUTPATIENT)
Dept: FAMILY MEDICINE CLINIC | Facility: CLINIC | Age: 63
End: 2023-06-08
Payer: COMMERCIAL

## 2023-06-08 VITALS
OXYGEN SATURATION: 94 % | WEIGHT: 293 LBS | HEART RATE: 71 BPM | DIASTOLIC BLOOD PRESSURE: 70 MMHG | SYSTOLIC BLOOD PRESSURE: 118 MMHG | HEIGHT: 65 IN | TEMPERATURE: 99 F | BODY MASS INDEX: 48.82 KG/M2 | RESPIRATION RATE: 20 BRPM

## 2023-06-08 DIAGNOSIS — M48.061 SPINAL STENOSIS OF LUMBAR REGION WITHOUT NEUROGENIC CLAUDICATION: Primary | ICD-10-CM

## 2023-06-08 DIAGNOSIS — M54.16 LUMBAR RADICULOPATHY: ICD-10-CM

## 2023-06-08 DIAGNOSIS — E66.01 MORBID OBESITY WITH BMI OF 50.0-59.9, ADULT (HCC): ICD-10-CM

## 2023-06-08 DIAGNOSIS — M54.9 INTRACTABLE BACK PAIN: ICD-10-CM

## 2023-06-08 DIAGNOSIS — M54.59 INTRACTABLE LOW BACK PAIN: ICD-10-CM

## 2023-06-08 PROCEDURE — 3078F DIAST BP <80 MM HG: CPT | Performed by: FAMILY MEDICINE

## 2023-06-08 PROCEDURE — 99495 TRANSJ CARE MGMT MOD F2F 14D: CPT | Performed by: FAMILY MEDICINE

## 2023-06-08 PROCEDURE — 3008F BODY MASS INDEX DOCD: CPT | Performed by: FAMILY MEDICINE

## 2023-06-08 PROCEDURE — 3074F SYST BP LT 130 MM HG: CPT | Performed by: FAMILY MEDICINE

## 2023-06-14 NOTE — TELEPHONE ENCOUNTER
Janene nurse, with [facility] pre admission, calling for consent clarification, nothing can be abbreviated on the consent. (ph#[id number])transferred to [facility]

## 2023-06-14 NOTE — TELEPHONE ENCOUNTER
PAT nurse called re: Surgical consent cannot have abbreviations.   L2-L3 needs to be Lumbar 2-Lumbar 3 etc.  Pls place case change request for surgical order and consent for both surgical orders for this patient

## 2023-06-16 ENCOUNTER — TELEPHONE (OUTPATIENT)
Dept: FAMILY MEDICINE CLINIC | Facility: CLINIC | Age: 63
End: 2023-06-16

## 2023-06-16 NOTE — TELEPHONE ENCOUNTER
Daughter, Marshia Fleischer called today. Patient fell at nursing home last night. She has been admitted to [facility] [place] in [place]    Dx: cellulitis    Daughter states she is being told it will be at least Monday before patient is discharged    Daughter concerned about signs of early dementia since being in nursing home    Signs:  Extremely paranoid (whispers in phone as she believes she's being watched or listened to)    Not using her phone correctly. She has numerously locked phone and keeps saying something is wrong with it. She is having difficulty remembering names. She is ok with family but can't remember her roommate's name that she's been with for a week. Patient has previously been very good with names    Daughter states patient has always been very fearful of dementia as her own mother passed away from it. She wanted pcp to know that patient might get very upset if it is brought up to her. Daughter wanting to know if this is something that can be tested? Can it be addressed at pre-op visit 7/5? OR. ..could the confusion possibly be caused by the infection/cellulitis? She is aware dr Jf Wong out of office and may not be contacted until next week.     Please adv  Thank you

## 2023-06-16 NOTE — TELEPHONE ENCOUNTER
Signed [employer] [employer] DME Order has been faxed to [phone]     DME Order  Face sheet  Last office visit note    Contact: Blair Sahu at [phone] to verify order is received. Signed external bone growth stimulator order was handed to David in person.   He scanned all required documents into his system

## 2023-06-20 ENCOUNTER — TELEPHONE (OUTPATIENT)
Dept: SURGERY | Facility: CLINIC | Age: 63
End: 2023-06-20

## 2023-06-20 NOTE — TELEPHONE ENCOUNTER
Rec'd fax from Fadia Crow for Detailed Written Order and Letter of Medical Necessity. Endorsed to nurses bin for provider review and completion.

## 2023-06-21 NOTE — TELEPHONE ENCOUNTER
Received a notification from prior auth team that CPT Codes 91414 should be replaced with [id number]    [address]

## 2023-06-21 NOTE — TELEPHONE ENCOUNTER
Detailed Written Order from [facility] received by [facility] clinical staff, endorsed to provider for review and signature. Placed on Rebecca's desk. Will be faxed and sent to scanning once received back from provider.

## 2023-06-22 NOTE — TELEPHONE ENCOUNTER
Detailed Written Order from [facility]  paperwork reviewed and signed by provider         Paperwork faxed to # [phone]  Copy sent to scan with cover sheet yes  Copy kept in the office until verified in media yes

## 2023-06-22 NOTE — TELEPHONE ENCOUNTER
Bridgette Mike from [facility] calling as rec'd fax but is missing the TLSO wording to be added to the body of the notes per Medicare. Please refax to [phone].

## 2023-06-23 ENCOUNTER — TELEPHONE (OUTPATIENT)
Dept: FAMILY MEDICINE CLINIC | Facility: CLINIC | Age: 63
End: 2023-06-23

## 2023-06-24 PROCEDURE — 3044F HG A1C LEVEL LT 7.0%: CPT | Performed by: FAMILY MEDICINE

## 2023-06-26 NOTE — TELEPHONE ENCOUNTER
CPT code 87058 for L5-S1 level was denied. All other CPT codes for part 1 of surgery approved below is denial rationale with P2P information     Case # [id number]    Your doctor wants you to have surgery to join the bones in your low back. This surgery is needed when certain criteria are met. You need to have special pictures of your low back. These pictures could be an X-ray, CT or MRI. Results should show that one of the bones moves forward or backwards by three millimeters (mm) or more. We reviewed the notes we received. The notes do not show that one of the bones moves forward or backwards by three millimeters (mm) or more. For this reason, this surgery is not medically necessary.       [facility] # [phone]    30 calendar days to complete P2P

## 2023-06-26 NOTE — TELEPHONE ENCOUNTER
Noted the prior auth feedback listed below. Routed to the surgeon to inquire if a peer to peer is warranted.

## 2023-06-27 NOTE — TELEPHONE ENCOUNTER
Noted the providers request to schedule a peer to peer. Called [facility] # [phone]   Case # [id number] (Part 1)  And Case #[id number](BMUZ [id number])      Spoke with Rose Mary Patel   Requesting to complete a peer to peer in one phone call.      Peer to Peer Scheduled for:   6/28/2023 at 1100 am Central Time   With Dr. Marguerite Neal    Provided Ludy Lindseya number to call   Placed in [address] [place] [address]

## 2023-06-28 NOTE — TELEPHONE ENCOUNTER
Patient is scheduled for L4-S1 ALIF on 7.11.23 with Dr Héctor Hurd at [facility].    N pre-op apt scheduled (if sx is more than 30 days from last apt)  Y Surgical instructions reviewed by nursing staff with patient  [facility] form completed  Y Surgery order signed   Y Placed sx on surgery sheet  Y Placed on outlook calendar  Y [employer] message sent to patient with sx instructions  Y Faxed pre-op clearance request to PCP [facility]   NA Faxed letter to prescribing provider requesting anticoagulants be held for surgery  NA E-mail sent to ABS - DR Sprague 77 appointments made  Y PA NEEDED. Routed to PA team to initiate. Y Post-Op outreach pt reminder placed.    Y Entire Neurosurgery Checklist Completed

## 2023-06-29 NOTE — TELEPHONE ENCOUNTER
Called [phone] and Initiated 2 day inpatient stay with [facility] JASMIN from [facility] for 7/11/2023 for L4-S1 ALIF, L2-L4 LLIF      Provided approved [facility] Codes: 39851, 4565 x3, 16632, 44275, 31040, 56082    Case # [id number]    Faxed clinicals with confirmation to [phone]

## 2023-06-30 NOTE — TELEPHONE ENCOUNTER
Fax received from [facility] re: PT/ INR, PTT, UA and UA C/S (labs dated 6.19.23)  PT/ INR and PTT will be repeated STAT on admission.   Endorsed to APRN for review

## 2023-07-03 ENCOUNTER — TELEPHONE (OUTPATIENT)
Dept: SURGERY | Facility: CLINIC | Age: 63
End: 2023-07-03

## 2023-07-03 DIAGNOSIS — Z01.818 PRE-OP TESTING: Primary | ICD-10-CM

## 2023-07-03 NOTE — TELEPHONE ENCOUNTER
Received abnormal PAT results. Discussed on the results with the WENDY Frost. Provider is requesting repeat of PAT labs a week prior to the patient surgery. Ordered Repeat PAT:   - PT/INR   - PTT   - UA and UA C/S    Called pt to inform. No answer LMTCB.    Sent pt a "[employer]" message

## 2023-07-05 ENCOUNTER — OFFICE VISIT (OUTPATIENT)
Dept: FAMILY MEDICINE CLINIC | Facility: CLINIC | Age: 63
End: 2023-07-05
Payer: COMMERCIAL

## 2023-07-05 VITALS
RESPIRATION RATE: 20 BRPM | BODY MASS INDEX: 50 KG/M2 | TEMPERATURE: 98 F | OXYGEN SATURATION: 95 % | WEIGHT: 293 LBS | HEART RATE: 76 BPM | SYSTOLIC BLOOD PRESSURE: 100 MMHG | DIASTOLIC BLOOD PRESSURE: 64 MMHG

## 2023-07-05 DIAGNOSIS — M54.59 INTRACTABLE LOW BACK PAIN: ICD-10-CM

## 2023-07-05 DIAGNOSIS — M48.062 SPINAL STENOSIS OF LUMBAR REGION WITH NEUROGENIC CLAUDICATION: Primary | ICD-10-CM

## 2023-07-05 DIAGNOSIS — R33.9 URINARY RETENTION: ICD-10-CM

## 2023-07-05 PROBLEM — Z01.818 PREOP EXAMINATION: Status: ACTIVE | Noted: 2023-07-05

## 2023-07-05 PROCEDURE — 99214 OFFICE O/P EST MOD 30 MIN: CPT | Performed by: FAMILY MEDICINE

## 2023-07-05 PROCEDURE — 3074F SYST BP LT 130 MM HG: CPT | Performed by: FAMILY MEDICINE

## 2023-07-05 PROCEDURE — 3078F DIAST BP <80 MM HG: CPT | Performed by: FAMILY MEDICINE

## 2023-07-05 RX ORDER — LAMOTRIGINE 150 MG/1
TABLET ORAL
COMMUNITY
Start: 2023-06-08

## 2023-07-05 NOTE — TELEPHONE ENCOUNTER
P2P Physician has upheld denial of day 2 of surgery: Posterior L2-S1 Decompression & Fusion. Dr Valdo Crowe is unable to perform surgery on patient if he can't complete surgical plan. Dr Valdo Crowe stated pt will need to see a surgeon from a [facility] and recommended the following physicians:  Dr Dian Cadet with [facility]  Dr Joselin Harrison with [facility] or  Dr Milton Calero with [facility] and advised of information above. Explained that surgery will need to be canceled and she will need to contact one of the above physicians in order to proceed with surgery.

## 2023-07-06 ENCOUNTER — TELEPHONE (OUTPATIENT)
Dept: FAMILY MEDICINE CLINIC | Facility: CLINIC | Age: 63
End: 2023-07-06

## 2023-07-06 ENCOUNTER — TELEPHONE (OUTPATIENT)
Dept: NEUROSURGERY | Age: 63
End: 2023-07-06

## 2023-07-06 NOTE — TELEPHONE ENCOUNTER
DAUGHTER CALLED AND ADV THAT SHE IS GETTING CONFLICTING INFO ABOUT PTS SURGERY. WAS ADV FROM PT THAT SURGERY WASN'T APPROVED,BUT PTS SON SAID IT WAS.     ADV FROM DAUGHTER THAT PT IS NOT THE BEST COMMUNICATOR AND WOULD LIKE TO SEE ABOUT GETTING AN ANSWER    PLEASE ADV    THANK YOU

## 2023-07-06 NOTE — TELEPHONE ENCOUNTER
Sera's insurance company refused to pay for the surgery as Dr. Philly Alexander had suggested. He tried several times to appeal and they refused. She really needs the surgery and so he personally recommended 3 surgeons that he was familiar with and would feel confident  in doing the procedure. One of these is a Dr. Yung Wells from [facility], whom I am familiar with.  Dr. Philly Alexander was going to contact him directly and fill him in on her case

## 2023-07-06 NOTE — TELEPHONE ENCOUNTER
Daughter was advised of the information yesterday    Daughter states she talked to mom yesterday and she was upset and was not making sense    Daughter was just wondering if we know anymore about the surgery and who she will see. Rn advised of the information below- she verbalized understanding.      She was thankful that we are able to help her mom and if she has further questions she will call back

## 2023-07-10 NOTE — TELEPHONE ENCOUNTER
Pt returned the phone call. Informed the patient of Dr. Maryanne Yoder message listed below. Informed the patient that the insurance denied the patient surgery . Informed patient that the provider has recommended that she follow up with the provider listed below:  Dr. Donya Merino - [phone]    Pt requested information to be sent via [employer]. Pt denies any other questions or concerns at this time     Call was ended     [employer] message sent.

## 2023-07-10 NOTE — TELEPHONE ENCOUNTER
Pt calling regarding canceled surgery. Pt is requesting a call back to discuss why surgery was cancelled. Pt has clinical questions about surgery and would also like to know why the surgeons were referred to pt.

## 2023-07-10 NOTE — TELEPHONE ENCOUNTER
Noted the front office message listed below. Spoke with Dr. Maryanne Yoder. The physician states that the insurance did not approve the type of surgery Dr. Maryanne Yoder is recommending. He is recommending the patient to other surgeons in hopes that they will have suggestions of a different approach for treatment for pt. Called patient to inform.  No answer TCB

## 2023-07-17 ENCOUNTER — TELEPHONE (OUTPATIENT)
Dept: FAMILY MEDICINE CLINIC | Facility: CLINIC | Age: 63
End: 2023-07-17

## 2023-07-17 NOTE — TELEPHONE ENCOUNTER
Rn Spoke to daughter and advised that we have not received anything regarding procedure for patient     [address] [facility]   [address] [facility], [address], [address] [place] [address]   [phone]   Daughter was advised that the nurosurgery office reachec out on 7/6/23    Daughter was advised- verbalized understanding

## 2023-07-20 NOTE — TELEPHONE ENCOUNTER
Pts daughter calling requesting a call to discuss next steps. Pts daughter states pt needs help in scheduling with the doctors. Pts daughter states she is still in rehab facility and isnt sure if she should call all doctors or pick one of the 3.

## 2023-08-02 ENCOUNTER — TELEPHONE (OUTPATIENT)
Dept: FAMILY MEDICINE CLINIC | Facility: CLINIC | Age: 63
End: 2023-08-02

## 2023-08-02 RX ORDER — TIZANIDINE 2 MG/1
1 TABLET ORAL 3 TIMES DAILY
Qty: 90 TABLET | Refills: 0 | Status: SHIPPED | OUTPATIENT
Start: 2023-08-02

## 2023-08-02 RX ORDER — QUETIAPINE FUMARATE 50 MG/1
100 TABLET, FILM COATED ORAL NIGHTLY
Qty: 90 TABLET | Refills: 2 | Status: SHIPPED | OUTPATIENT
Start: 2023-08-02

## 2023-08-02 RX ORDER — LAMOTRIGINE 150 MG/1
150 TABLET ORAL DAILY
Qty: 90 TABLET | Refills: 2 | Status: SHIPPED | OUTPATIENT
Start: 2023-08-02

## 2023-08-02 RX ORDER — DIAZEPAM 5 MG/1
5 TABLET ORAL EVERY 12 HOURS PRN
Qty: 60 TABLET | Refills: 0 | Status: SHIPPED | OUTPATIENT
Start: 2023-08-02

## 2023-08-02 RX ORDER — ATORVASTATIN CALCIUM 20 MG/1
20 TABLET, FILM COATED ORAL NIGHTLY
Qty: 90 TABLET | Refills: 2 | Status: SHIPPED | OUTPATIENT
Start: 2023-08-02

## 2023-08-02 RX ORDER — OXCARBAZEPINE 150 MG/1
150 TABLET, FILM COATED ORAL 2 TIMES DAILY
Qty: 180 TABLET | Refills: 2 | Status: SHIPPED | OUTPATIENT
Start: 2023-08-02

## 2023-08-02 RX ORDER — VENLAFAXINE HYDROCHLORIDE 150 MG/1
300 CAPSULE, EXTENDED RELEASE ORAL DAILY
Qty: 180 CAPSULE | Refills: 0 | Status: SHIPPED | OUTPATIENT
Start: 2023-08-02

## 2023-08-02 RX ORDER — OXYCODONE HYDROCHLORIDE AND ACETAMINOPHEN 5; 325 MG/1; MG/1
1-2 TABLET ORAL EVERY 6 HOURS PRN
Qty: 30 TABLET | Refills: 0 | Status: SHIPPED | OUTPATIENT
Start: 2023-08-02 | End: 2023-09-01

## 2023-08-02 RX ORDER — GABAPENTIN 300 MG/1
600 CAPSULE ORAL 3 TIMES DAILY
Qty: 90 CAPSULE | Refills: 2 | Status: SHIPPED | OUTPATIENT
Start: 2023-08-02

## 2023-08-02 RX ORDER — METOPROLOL SUCCINATE 25 MG/1
TABLET, EXTENDED RELEASE ORAL
Qty: 180 TABLET | Refills: 2 | Status: SHIPPED | OUTPATIENT
Start: 2023-08-02

## 2023-08-02 NOTE — TELEPHONE ENCOUNTER
Patient discharged from rehab center over the weekend    Would like to speak to nurse to go over meds and status update    Please adv  Thank you

## 2023-08-02 NOTE — TELEPHONE ENCOUNTER
Pt states she went got discharge and she was in rehab facility She was not happy with her care. Pt states she was had single punch pills- but she was discharged with out all her meds    She states that some of most of her important medications she was not discharged with and she was not be given the medications in the facility    She then got sent to a different facility- and did not appreciate her care there - she states when she was discharged they did not give her all her stuff, and her medication. When son went back to get them they \"disposed of it\"    Pt states that she has NO prescription medications at home-  [facility] was the last location     She states she   Venlafaxine   Oxcarbazine    Pt states she can not afford the xarelto and the [place]- what can we do for those.       Pt is also looking for diabetic test meter- and strips and lancets

## 2023-08-21 ENCOUNTER — TELEPHONE (OUTPATIENT)
Dept: FAMILY MEDICINE CLINIC | Facility: CLINIC | Age: 63
End: 2023-08-21

## 2023-08-21 NOTE — TELEPHONE ENCOUNTER
RN attempted to return call to PT-VM box is full unable to leave message    Paperwork was received from [employer] for commode, wheelchair- but it is not the DME listed below?   Need to verify if we can send back to [facility] or if we should use [employer]

## 2023-08-21 NOTE — TELEPHONE ENCOUNTER
[id number] Joselyn Lopez is PT    Requesting orders faxed to [facility]    Bariatric or [facility]    Patient's current walker is broken    Patient is having a lot of weakness  She is having incontinence as she cannot make it to bathroom a lot of the times due to weakness    [facility] Fax #  [phone]

## 2023-08-21 NOTE — TELEPHONE ENCOUNTER
[facility] CALLED AND ADV NEEDS LAST OFFICE PROGRESS NOTES FOR PT.    PRINTED AND FAXED -[phone].     WOULD LIKE TO KNOW IF DR WILL SIGN OFF ON HOME HEALTH ORDERS    PLEASE ADV     THANK YOU

## 2023-08-21 NOTE — TELEPHONE ENCOUNTER
RN spoke to intake and they advised that they will be sending orders over via fax for Dr. Polo Ormond to confirm

## 2023-08-24 RX ORDER — OXYCODONE HYDROCHLORIDE AND ACETAMINOPHEN 5; 325 MG/1; MG/1
1-2 TABLET ORAL EVERY 6 HOURS PRN
Qty: 30 TABLET | Refills: 0 | Status: SHIPPED | OUTPATIENT
Start: 2023-08-24 | End: 2023-09-23

## 2023-08-24 NOTE — TELEPHONE ENCOUNTER
JAMAICA FROM [facility] CALLED AND ADV WOULD LIKE TO GIVE CONDITION UPDATE. JAMAICA SEE'S PT 3 TIMES A WEEK AND ADV THAT PT HAS FALLEN 2 TIMES THIS WEEK, HAS SOME CONFUSION, AND HALLUCINATIONS. WAS ADV THAT PT IS SEEING BLOODY PEOPLE OUT SIDE HER WINDOW. JAMAICA ADV THAT SHE IS RUNNING SOME BLOOD TESTS AND A UA.       PT ON PHONE AND ADV NEEDS REFILL OF MEDS:     oxyCODONE-acetaminophen 5-325 MG Oral Tab     PLEASE SEND TO Claritza Olguin [address]    Max Velazquez

## 2023-08-24 NOTE — TELEPHONE ENCOUNTER
Please see note below - please advise    Refill request:  LOV 07/05/23  Last refill on 08/02/23, for #30 tabs, with 0 refills  oxyCODONE-acetaminophen 5-325 MG Oral Tab     Future Appointments   Date Time Provider Jennifer Stanley   12/20/2023  1:00 PM WENDY Friend [facility]     Order(s) pending, please review. Thank you.

## 2023-08-30 ENCOUNTER — TELEPHONE (OUTPATIENT)
Dept: FAMILY MEDICINE CLINIC | Facility: CLINIC | Age: 63
End: 2023-08-30

## 2023-08-30 ENCOUNTER — HOSPITAL ENCOUNTER (INPATIENT)
Facility: HOSPITAL | Age: 63
LOS: 9 days | Discharge: NURSING, REHAB OR LONG-TERM CARE | End: 2023-09-08
Attending: EMERGENCY MEDICINE | Admitting: HOSPITALIST
Payer: COMMERCIAL

## 2023-08-30 ENCOUNTER — APPOINTMENT (OUTPATIENT)
Dept: CT IMAGING | Facility: HOSPITAL | Age: 63
End: 2023-08-30
Attending: EMERGENCY MEDICINE
Payer: COMMERCIAL

## 2023-08-30 DIAGNOSIS — N39.0 URINARY TRACT INFECTION WITHOUT HEMATURIA, SITE UNSPECIFIED: Primary | ICD-10-CM

## 2023-08-30 DIAGNOSIS — R44.3 HALLUCINATIONS: ICD-10-CM

## 2023-08-30 LAB
ALBUMIN SERPL-MCNC: 3.4 G/DL (ref 3.4–5)
ALBUMIN/GLOB SERPL: 0.9 {RATIO} (ref 1–2)
ALP LIVER SERPL-CCNC: 112 U/L
ALT SERPL-CCNC: 13 U/L
ANION GAP SERPL CALC-SCNC: 3 MMOL/L (ref 0–18)
AST SERPL-CCNC: 12 U/L (ref 15–37)
BASOPHILS # BLD AUTO: 0.04 X10(3) UL (ref 0–0.2)
BASOPHILS NFR BLD AUTO: 0.5 %
BILIRUB SERPL-MCNC: 0.3 MG/DL (ref 0.1–2)
BILIRUB UR QL STRIP.AUTO: NEGATIVE
BUN BLD-MCNC: 12 MG/DL (ref 7–18)
CALCIUM BLD-MCNC: 9.4 MG/DL (ref 8.5–10.1)
CHLORIDE SERPL-SCNC: 110 MMOL/L (ref 98–112)
CLARITY UR REFRACT.AUTO: CLEAR
CO2 SERPL-SCNC: 28 MMOL/L (ref 21–32)
COLOR UR AUTO: YELLOW
CREAT BLD-MCNC: 0.88 MG/DL
EGFRCR SERPLBLD CKD-EPI 2021: 74 ML/MIN/1.73M2 (ref 60–?)
EOSINOPHIL # BLD AUTO: 0.31 X10(3) UL (ref 0–0.7)
EOSINOPHIL NFR BLD AUTO: 4 %
ERYTHROCYTE [DISTWIDTH] IN BLOOD BY AUTOMATED COUNT: 13.7 %
GLOBULIN PLAS-MCNC: 3.9 G/DL (ref 2.8–4.4)
GLUCOSE BLD-MCNC: 95 MG/DL (ref 70–99)
GLUCOSE UR STRIP.AUTO-MCNC: NORMAL MG/DL
HCT VFR BLD AUTO: 37.5 %
HGB BLD-MCNC: 11.7 G/DL
IMM GRANULOCYTES # BLD AUTO: 0.01 X10(3) UL (ref 0–1)
IMM GRANULOCYTES NFR BLD: 0.1 %
KETONES UR STRIP.AUTO-MCNC: NEGATIVE MG/DL
LEUKOCYTE ESTERASE UR QL STRIP.AUTO: 500
LYMPHOCYTES # BLD AUTO: 1.34 X10(3) UL (ref 1–4)
LYMPHOCYTES NFR BLD AUTO: 17.4 %
MCH RBC QN AUTO: 26.8 PG (ref 26–34)
MCHC RBC AUTO-ENTMCNC: 31.2 G/DL (ref 31–37)
MCV RBC AUTO: 85.8 FL
MONOCYTES # BLD AUTO: 0.65 X10(3) UL (ref 0.1–1)
MONOCYTES NFR BLD AUTO: 8.4 %
NEUTROPHILS # BLD AUTO: 5.35 X10 (3) UL (ref 1.5–7.7)
NEUTROPHILS # BLD AUTO: 5.35 X10(3) UL (ref 1.5–7.7)
NEUTROPHILS NFR BLD AUTO: 69.6 %
NITRITE UR QL STRIP.AUTO: NEGATIVE
OSMOLALITY SERPL CALC.SUM OF ELEC: 292 MOSM/KG (ref 275–295)
PH UR STRIP.AUTO: 5.5 [PH] (ref 5–8)
PLATELET # BLD AUTO: 259 10(3)UL (ref 150–450)
POTASSIUM SERPL-SCNC: 3.7 MMOL/L (ref 3.5–5.1)
PROT SERPL-MCNC: 7.3 G/DL (ref 6.4–8.2)
PROT UR STRIP.AUTO-MCNC: 20 MG/DL
RBC # BLD AUTO: 4.37 X10(6)UL
RBC UR QL AUTO: NEGATIVE
SODIUM SERPL-SCNC: 141 MMOL/L (ref 136–145)
SP GR UR STRIP.AUTO: 1.02 (ref 1–1.03)
UROBILINOGEN UR STRIP.AUTO-MCNC: NORMAL MG/DL
WBC # BLD AUTO: 7.7 X10(3) UL (ref 4–11)

## 2023-08-30 PROCEDURE — 70450 CT HEAD/BRAIN W/O DYE: CPT | Performed by: EMERGENCY MEDICINE

## 2023-08-30 PROCEDURE — 99223 1ST HOSP IP/OBS HIGH 75: CPT | Performed by: HOSPITALIST

## 2023-08-30 RX ORDER — VENLAFAXINE HYDROCHLORIDE 75 MG/1
300 CAPSULE, EXTENDED RELEASE ORAL DAILY
Status: DISCONTINUED | OUTPATIENT
Start: 2023-08-30 | End: 2023-09-09

## 2023-08-30 RX ORDER — OXCARBAZEPINE 150 MG/1
150 TABLET, FILM COATED ORAL 2 TIMES DAILY
Status: DISCONTINUED | OUTPATIENT
Start: 2023-08-30 | End: 2023-09-09

## 2023-08-30 RX ORDER — ALPRAZOLAM 0.5 MG/1
0.5 TABLET ORAL 3 TIMES DAILY PRN
Status: DISCONTINUED | OUTPATIENT
Start: 2023-08-30 | End: 2023-08-31

## 2023-08-30 RX ORDER — OXYCODONE HYDROCHLORIDE AND ACETAMINOPHEN 5; 325 MG/1; MG/1
1-2 TABLET ORAL EVERY 6 HOURS PRN
Status: DISCONTINUED | OUTPATIENT
Start: 2023-08-30 | End: 2023-09-09

## 2023-08-30 RX ORDER — METOPROLOL SUCCINATE 25 MG/1
25 TABLET, EXTENDED RELEASE ORAL
Status: DISCONTINUED | OUTPATIENT
Start: 2023-08-31 | End: 2023-09-09

## 2023-08-30 RX ORDER — LAMOTRIGINE 150 MG/1
150 TABLET ORAL DAILY
Status: DISCONTINUED | OUTPATIENT
Start: 2023-08-30 | End: 2023-09-09

## 2023-08-30 RX ORDER — ATORVASTATIN CALCIUM 20 MG/1
20 TABLET, FILM COATED ORAL NIGHTLY
Status: DISCONTINUED | OUTPATIENT
Start: 2023-08-30 | End: 2023-09-09

## 2023-08-30 RX ORDER — QUETIAPINE FUMARATE 100 MG/1
100 TABLET, FILM COATED ORAL NIGHTLY
Status: DISCONTINUED | OUTPATIENT
Start: 2023-08-30 | End: 2023-08-31

## 2023-08-30 RX ORDER — GABAPENTIN 300 MG/1
600 CAPSULE ORAL 3 TIMES DAILY
Status: DISCONTINUED | OUTPATIENT
Start: 2023-08-30 | End: 2023-08-31

## 2023-08-30 NOTE — ED INITIAL ASSESSMENT (HPI)
Patient sent for Urine Culture showing Klebsiella. Patient is poor historian, no family with. Home health RN texted her.

## 2023-08-30 NOTE — TELEPHONE ENCOUNTER
Rn Spoke to [facility] RN that is there with pt- she states pt is still falling regularly and still hallucinating    [facility] RN believes she should be seen inpatient and is calling EMS to have patient evaluated    Spoke with Covering provider and agreed pt should be evaluated    Routing to PCP as Lenin Cavanaugh

## 2023-08-30 NOTE — TELEPHONE ENCOUNTER
Roni Garcia with home health care states that she faxed lab work over and is wondering if it was reviewed and if there are new orders. Pt is still having hallucinations and has had repeated falls -EMTs were called to get pt off floor but she is refusing going to hospital.  Roni Garcia believe pt needs inpatient psych eval.   Please advise. Thank you!

## 2023-08-30 NOTE — TELEPHONE ENCOUNTER
Rn Spoke to daughter- she is concerned about mom's health. Devonte Wilmer is concerned that she has not heard the results from the blood work and UA from the visit with home health. Daughter is struggling because she is feeling like her mom is not helping herself to get better and worried that she is harmful to herself. She doesn't know if she is willing to take care of herself or if she does not have the capacity to take care of herself. Does Dr. Corrina De Oliveira think there is enough documentation to have patient qualify for assisted listing? Or for daughter to get guardianship? Daughter states that she and brother have POA that was signed when patient was in the rehabilitation. She is going to fax ove ra copy to update in pt chart    Daughter is going to talk to the [facility] RN today and see about the results from last week. If she talks to [place] she will have the results sent over and let us know the result.      This is FYI to provider

## 2023-08-30 NOTE — TELEPHONE ENCOUNTER
Pt daughter asking for nurse to call her regarding recent labs; pt was hallucinating recently believed to be UTI, but daughter cannot get a hold of home health nurse. Pt daughter worried and is asking for someone to give her an update on her mother.

## 2023-08-30 NOTE — TELEPHONE ENCOUNTER
Rn Spoke to daughter she wanted to make sure that taking the patient to [facility] was the right choice    RN advised that Dr. Alfa Ellis can follow pt through the chart and I believe her neurologist and pain specialist are with [facility].     Daughter advised that brother is taking pt to the hospital as the urine came back and she has a UTI and it is not treatable by at home ABX per the [facility] RN

## 2023-08-31 LAB — GLUCOSE BLD-MCNC: 95 MG/DL (ref 70–99)

## 2023-08-31 PROCEDURE — 90792 PSYCH DIAG EVAL W/MED SRVCS: CPT | Performed by: OTHER

## 2023-08-31 PROCEDURE — 99232 SBSQ HOSP IP/OBS MODERATE 35: CPT | Performed by: HOSPITALIST

## 2023-08-31 RX ORDER — GABAPENTIN 300 MG/1
600 CAPSULE ORAL 2 TIMES DAILY
Status: DISCONTINUED | OUTPATIENT
Start: 2023-08-31 | End: 2023-09-09

## 2023-08-31 RX ORDER — POTASSIUM CHLORIDE 14.9 MG/ML
20 INJECTION INTRAVENOUS ONCE
Status: COMPLETED | OUTPATIENT
Start: 2023-08-31 | End: 2023-08-31

## 2023-08-31 RX ORDER — QUETIAPINE FUMARATE 25 MG/1
50 TABLET, FILM COATED ORAL NIGHTLY
Status: DISCONTINUED | OUTPATIENT
Start: 2023-08-31 | End: 2023-09-09

## 2023-08-31 NOTE — PHYSICAL THERAPY NOTE
PHYSICAL THERAPY EVALUATION - INPATIENT     Room Number: 3054/4170-G  Evaluation Date: 8/31/2023  Type of Evaluation: Initial  Physician Order: PT Eval and Treat    Presenting Problem: UTI, hallucinations  Co-Morbidities : chronic low back pain, bipolar disorder, A-fib on DOAC, CHF, depression, DMII, HTN, migraines  Reason for Therapy: Mobility Dysfunction and Discharge Planning    History related to current admission: Patient is a 60year old female admitted on 8/30/2023 from home for generalized weakness, falls and dysuria. Pt diagnosed with UTI and hallucinations. Previous Admits:  3/24/23-3/26/23 with intractable back pain; discharged home  5/1/23-5/6/23 with intractable back pain; discharged home  5/14/23-5/25/23 with intractable back pain; discharged to [address])  6/15/23-6/26/23 at [facility], with closed head injury, cellulitis of LE; discharged to [place] [id number] [address])    ASSESSMENT   In this PT evaluation, the patient presents with the following impairments : 1) impaired B U/LE strength, 2) impaired static and dynamic sitting and standing balance, 3) impaired muscular endurance, 4) low back pain. These impairments and comorbidities manifest themselves as functional limitations in independent bed mobility, transfers, and gait. The patient is below baseline and would benefit from skilled inpatient PT to address the above deficits to assist patient in returning to prior to level of function. Functional outcome measures completed include AMPAC. The AM-PAC '6-Clicks' Inpatient Basic Mobility Short Form was completed and this patient is demonstrating a Approx Degree of Impairment: 64.91%  degree of impairment in mobility. Research supports that patients with this level of impairment may benefit from KAY. DISCHARGE RECOMMENDATIONS  PT Discharge Recommendations: Sub-acute rehabilitation    PLAN  PT Treatment Plan: Bed mobility; Endurance; Patient education; Family education;Gait training;Strengthening;Transfer training;Balance training; Body mechanics  Rehab Potential : Good  Frequency (Obs): 3-5x/week  Number of Visits to Meet Established Goals: 3      CURRENT GOALS    Goal #1 Patient is able to demonstrate supine - sit EOB @ level: minimum assistance     Goal #2 Patient is able to demonstrate transfers Sit to/from Stand at assistance level: minimum assistance     Goal #3 Patient is able to ambulate 50 feet with assist device: walker - rolling at assistance level: minimum assistance     Goal #4    Goal #5    Goal #6    Goal Comments: Goals established on 2023    HOME SITUATION  Type of Home: [address]: One level  Stairs to Enter : 1  Railing: Yes  Stairs to Bedroom: 0       Lives With: Son (age, 28 years)  Drives: Yes (but has not driven in 4 months)  Patient Owned Equipment: Rolling walker; Wheelchair  Patient Regularly Uses: Reading glasses    Prior Level of [place]: The pt reports she has been home from rehab for about a month. Pt has been ambulating short, household distances with the use of a rolling walker. Pt reports she has had 3-4 falls in the past month, due to tripping over the doorway threshold multiple times an on another occasion having her legs give out. Pt was receiving home RN and PT.    SUBJECTIVE  \"I'm doing a hell of a lot worse than I was. \"      OBJECTIVE  Precautions: Bed/chair alarm  Fall Risk: High fall risk    WEIGHT BEARING RESTRICTION  Weight Bearing Restriction: None                PAIN ASSESSMENT  Ratin  Location: \"My whole body roxanne aches. \"  Management Techniques:  Activity promotion;Relaxation;Repositioning    COGNITION  Overall Cognitive Status:  WFL - within functional limits    RANGE OF MOTION AND STRENGTH ASSESSMENT  Upper extremity ROM and strength are within functional limits     Lower extremity ROM is within functional limits     Lower extremity strength is within functional limits, LLE grossly 3+/5 with c/o sciatic pain, RLE grossly 4/5       BALANCE  Static Sitting: Fair  Dynamic Sitting: Fair -  Static Standing: Poor +  Dynamic Standing: Poor      ACTIVITY TOLERANCE  Pulse: 58  Heart Rate Source: Monitor                   O2 WALK  Oxygen Therapy  SPO2% on Room Air at Rest: 93  SPO2% on Oxygen at Rest: 98  At rest oxygen flow (liters per minute): 2        AM-PAC '6-Clicks' INPATIENT SHORT FORM - BASIC MOBILITY  How much difficulty does the patient currently have. .. Patient Difficulty: Turning over in bed (including adjusting bedclothes, sheets and blankets)?: A Lot   Patient Difficulty: Sitting down on and standing up from a chair with arms (e.g., wheelchair, bedside commode, etc.): A Lot   Patient Difficulty: Moving from lying on back to sitting on the side of the bed?: A Lot   How much help from another person does the patient currently need. .. Help from Another: Moving to and from a bed to a chair (including a wheelchair)?: A Lot   Help from Another: Need to walk in hospital room?: A Little   Help from Another: Climbing 3-5 steps with a railing?: A Lot       AM-PAC Score:  Raw Score: 13   Approx Degree of Impairment: 64.91%   Standardized Score (AM-PAC Scale): 36.74   CMS Modifier (G-Code): CL    FUNCTIONAL ABILITY STATUS  Gait Assessment   Functional Mobility/Gait Assessment  Gait Assistance: Moderate assistance  Distance (ft): 10x2  Assistive Device: Rolling walker  Pattern:  (short steps, decreased step length and clearance, increased trunk flexion)    Skilled Therapy Provided     Bed Mobility:  Rolling: Max A  Supine to sit: Max A   Sit to supine: Max A     Transfer Mobility:  Sit to stand: Max A   Stand to sit: Max A  Gait = Mod A    Therapist's Comments: Pt educated on log roll and completed supine<>sidelying<>sit with verbal and manual cues for sequencing. Pt completed sit<>stand with verbal cues for positioning to facilitate transfer, sequencing,  and hand placement.   With ambulation pt required verbal cues for upright posture and to maintain body within RW base of support. Pt ambulated from bed>toilet. Pt completed toileting with SBA, but required Max A for hygiene care. Pt ambulated from toilet to chair. Pt able to sit for less than 5 minutes and reporting pain was too severe, pt assisted back to bed. Exercise/Education Provided:  Bed mobility  Body mechanics  Functional activity tolerated  Gait training  Neuromuscular re-educate  Posture  Strengthening  Transfer training    Patient End of Session: In bed;Needs met;Call light within reach;RN aware of session/findings; All patient questions and concerns addressed; With [facility] staff      Patient Evaluation Complexity Level:  History High - 3 or more personal factors and/or co-morbidities   Examination of body systems High - addressing a total of 4 or more elements   Clinical Presentation Moderate - Evolving   Clinical Decision Making Low - Stable       PT Session Time: 45 minutes  Gait Trainin minutes  Therapeutic Activity: 10 minutes

## 2023-08-31 NOTE — PROGRESS NOTES
[facility] Pharmacy Note:  Renal Adjustment for cefepime (MAXIPIME)    Alysia Olguin is a 60year old patient who has been prescribed cefepime (MAXIPIME) 1 mg every 8 hrs. The estimated creatinine clearance is 58.9 mL/min (based on SCr of 0.88 mg/dL). The dose has been adjusted to cefepime (MAXIPIME) 2 mg every 8 hrs per hospital renal dose adjustment protocol for treatment of UTI. Pharmacy will follow and adjust dose as warranted for additional renal function changes.     Thank you,    Issa Arita, PharmD  8/31/2023  11:29 AM

## 2023-08-31 NOTE — PLAN OF CARE
Assumed care at 0730. A&O 3. Room air. Fluids- Scheduled Cefepime at 25 mL/hr. See MAR and managed orders for more details. Regular diet. Tele- SB.  VTE- Xarelto. See MAR for more details. PT valentina completed this shift and recommending KAY. See note for more details. PRN pain meds utilized this shift. See MAR for more details. Cardiac electrolyte replacement protocol. Potassium (3.7) replaced this shift. See managed orders and MAR for more details. 2 assist with the walker to the bathroom. Psych consulted this shift. See managed orders for more details. Pt frequently reoriented to the room, safety prec initiated, bed is in the lowest position and call light within reach. Pt and daughter via phone call updated on the plan of care. Continued to monitor. 1329: Electrolyte protocol changed to non cardiac. See managed orders for more details. Problem: NEUROLOGICAL - ADULT  Goal: Achieves stable or improved neurological status  Description: INTERVENTIONS  - Assess for and report changes in neurological status  - Initiate measures to prevent increased intracranial pressure  - Maintain blood pressure and fluid volume within ordered parameters to optimize cerebral perfusion and minimize risk of hemorrhage  - Monitor temperature, glucose, and sodium.  Initiate appropriate interventions as ordered  Outcome: Progressing  Goal: Achieves maximal functionality and self care  Description: INTERVENTIONS  - Monitor swallowing and airway patency with patient fatigue and changes in neurological status  - Encourage and assist patient to increase activity and self care with guidance from PT/OT  - Encourage visually impaired, hearing impaired and aphasic patients to use assistive/communication devices  Outcome: Progressing     Problem: Impaired Functional Mobility  Goal: Achieve highest/safest level of mobility/gait  Description: Interventions:  - Assess patient's functional ability and stability  - Promote increasing activity/tolerance for mobility and gait  - Educate and engage patient/family in tolerated activity level and precautions  - Recommend use of chair position in bed 3 times per day  Outcome: Progressing     Problem: Impaired Activities of Daily Living  Goal: Achieve highest/safest level of independence in self care  Description: Interventions:  - Assess ability and encourage patient to participate in ADLs to maximize function  - Promote sitting position while performing ADLs such as feeding, grooming, and bathing  - Educate and encourage patient/family in tolerated functional activity level and precautions during self-care  - Encourage patient to incorporate impaired side during daily activities to promote function  Outcome: Progressing

## 2023-08-31 NOTE — PROGRESS NOTES
ED Antibiotic Dose Adjustment by Pharmacy    ceftriaxone (ROCEPHIN) 1 g x1 dose has been ordered. Pharmacy has adjusted the dose to ceftriaxone (ROCEPHIN) 2 g x1 per hospital protocol.     Idalia Ames, PharmD  Clinical Pharmacist Specialist- Emergency Medicine  [facility]  [phone]

## 2023-08-31 NOTE — ED QUICK NOTES
Orders for admission, patient is aware of plan and ready to go upstairs. Any questions, please call ED RN Aubrey Olivas at extension [phone]. Patient Covid vaccination status: Fully vaccinated     COVID Test Ordered in ED: None    COVID Suspicion at Admission: N/A    Running Infusions:  None    Mental Status/LOC at time of transport: Alert, oriented to name  and situation. Rambling speech at times. Hallucinations at home. Hx of mental illness. Other pertinent information: Uses wheelchair and assist X1 to get to bathroom.    CIWA score: N/A   NIH score:  N/A

## 2023-08-31 NOTE — PROGRESS NOTES
PSYCH CONSULT    Date of Admission: 8/30/23  Date of Consult: 8/31/23  Reason for Consultation: Polypharmacy, hx halluciations    Impression:  Primary Psychiatric Diagnosis:  Major depressive disorder, recurrent, moderate. Anxiety disorder unspecified. Hx hallucinations and paranoid ideation due to acute delirium. Suspect polypharmacy as a contributing factor. NO psychosis at this time. No acute delirium at this moment. IF present on admission, it has resolved. Pertinent Medical Diagnoses:  Possible UTI. Chronic back pain due to lumbar spinal stenosis. Migraines. ADRIANA. Morbid obesity. DM2. HFpEF. A-fib. HTN. Hx psoriasis. Recommendations:  1) Decrease Seroquel from 100mg to 50mg nightly. Used for insomnia and mood lability. She was advised by her psychiatrist to take 50mg to 200mg nightly. She uses 50mg nightly on average. 2) Decrease Neurontin from 600mg three times a day to what she has been taking at home--600mg twice a day for chronic pain and anxiety. 3) Continue Trileptal and Lamictal for mood lability/irritability and Effexor for anxiety/depression. 4) DC Xanax prn. She has not been taking it at home and feels ok without it. 5) She remains on her Percocet 5mg/325mg 1-2 tabs PRN pain and uses 1 tab 2-3x/day on average. She does not take the Zanaflex prn anymore. She is not on Valium prn either. 6) She will follow up with her outpatient psychiatrist.    7) She will follow up with the neurosurgeon who has agreed to perform the lumbar surgery. Full note to follow.     Dr. Wilber Hawk

## 2023-08-31 NOTE — CONSULTS
[facility]  Report of Psychiatric Consultation    Lisseth Cornejo Patient Status:  Inpatient    10/20/1960 MRN [id number]   [facility] Attending Eduardo Nettles MD   [facility] Day # 1 PCP Zaire Myrick DO     Date of Admission: 23  Date of Consult: 23  Reason for Consultation: Polypharmacy, hx halluciations    Impression:  Primary Psychiatric Diagnosis:  Major depressive disorder, recurrent, moderate. Anxiety disorder unspecified. Hx hallucinations and paranoid ideation due to acute delirium. Suspect polypharmacy as a contributing factor. NO psychosis at this time. No acute delirium at this moment. IF present on admission, it has resolved. Pertinent Medical Diagnoses:  Possible UTI. Chronic back pain due to lumbar spinal stenosis. Migraines. ADRIANA. Morbid obesity. DM2. HFpEF. A-fib. HTN. Hx psoriasis. Recommendations:  1) Decrease Seroquel from 100mg to 50mg nightly. Used for insomnia and mood lability. She was advised by her psychiatrist to take 50mg to 200mg nightly. She uses 50mg nightly on average. 2) Decrease Neurontin from 600mg three times a day to what she has been taking at home--600mg twice a day for chronic pain and anxiety. 3) Continue Trileptal and Lamictal for mood lability/irritability and Effexor for anxiety/depression. 4) DC Xanax prn. She has not been taking it at home and feels ok without it. 5) She remains on her Percocet 5mg/325mg 1-2 tabs PRN pain and uses 1 tab 2-3x/day on average. She does not take the Zanaflex prn anymore. She is not on Valium prn either. 6) She will follow up with her outpatient psychiatrist.    7) She will follow up with the neurosurgeon who has agreed to perform the lumbar surgery.      Alexis Cardenas MD  2023  1:32 PM    History of Present Illness:  61 yo female with anxiety and depression and polypharmacy was admitted on 23 for treatment of a possible UTI and eval of her mental status due to her recent hx of intermittent hallucinations at home per report. She called the police recently and said there was an intruder in her home. During this admission, she was started on antibiotics. UCx pending. She is alert and oriented x 4 at this moment. She has a hx of severe lumbar spinal stenosis and is on chronic oxycodone for pain control. She has had epidural steroid injections. She saw neurosurg recently and plans to have surgery in the near future because the back pain has limited her physically and decreased her quality of life. Regarding delirium inducing meds, she is on seroquel, neurontin, and trileptal, She has not taken the zanaflex prn, valium prn, xanax prn in several weeks. She talked about feeling lonely even thought her son moved in with her. She has a daughter who lives in [place] and another daughter who hasn't talked to her in 9 yrs. She feels tired and depressed and has low motivation and difficulty enjoying life. She feels helpless, but not hopeless or suicidal. She has NO voices/visions at this time. NO paranoid ideation. Psychiatry Review Systems: No voices or visions. No elevated mood, racing thoughts, decreased need for sleep, grandiose thoughts. Past Psychiatric History: Anxiety unspecified. Major depressive disorder, recurrent. Substance Use History: None. She used to binge drink as a teenager. Psych Family History: Anxiety and depression. Daughter with AUD. Social and Developmental History: She was a homemaker. She has 3 children. Her son lives with her in [place]. She has a daughter who lives in [place]. She is estranged from her youngest daughter; they haven't spoke in 9 yrs.      Past Medical History:   Diagnosis Date    Anesthesia complication     Anxiety state     Arrhythmia     A fib    Asthma     Calculus of kidney     Congestive heart disease ([facility] [address]     Depression     Diabetes ([facility] [address] 11/17/2020    Difficult intubation     DJD (degenerative joint disease), lumbar     High blood pressure     Left knee DJD     Migraine     Migraines     ADRIANA (obstructive sleep apnea) 12/01/2020    Psoriasis     Shortness of breath     Sleep apnea     no device    TMJ arthritis     Visual impairment     glasses     Past Surgical History:   Procedure Laterality Date    ABLATION      Cardiac. A fib    ENDOMETRIAL ABLATION      OTHER SURGICAL HISTORY      R shoulder arthroplasty. L knee exploratory surgery, B foot surgery, uterine d and c    TONSILLECTOMY       Family History   Problem Relation Age of Onset    Musculo-skelatal Disorder Mother         osteopenia    Thyroid Disorder Mother         hypo    Psychiatric Mother         dementia    Diabetes Father       reports that she has quit smoking. Her smoking use included cigarettes. She started smoking about 10 years ago. She quit smokeless tobacco use about 23 years ago. She reports that she does not currently use alcohol. She reports that she does not use drugs.     Allergies:    Bee Venom               SHORTNESS OF BREATH    Comment:Shortness of breath  Sulfa Antibiotics       UNKNOWN  Clindamycin             RASH    Medications:    Current Facility-Administered Medications:     ceFEPIme (Maxpime) 2 g in sodium chloride 0.9% 100 mL IVPB-MBP, 2 g, Intravenous, Q8H    potassium chloride 20 mEq/100mL IVPB premix 20 mEq, 20 mEq, Intravenous, Once    ALPRAZolam (Xanax) tab 0.5 mg, 0.5 mg, Oral, TID PRN    atorvastatin (Lipitor) tab 20 mg, 20 mg, Oral, Nightly    gabapentin (Neurontin) cap 600 mg, 600 mg, Oral, TID    lamoTRIgine (LaMICtal) tab 150 mg, 150 mg, Oral, Daily    metoprolol succinate ER (Toprol XL) 24 hr tab 25 mg, 25 mg, Oral, Daily Beta Blocker    OXcarbazepine (Trileptal) tab 150 mg, 150 mg, Oral, BID    oxyCODONE-acetaminophen (Percocet) 5-325 MG per tab 1-2 tablet, 1-2 tablet, Oral, Q6H PRN    QUEtiapine (SEROquel) tab 100 mg, 100 mg, Oral, Nightly    venlafaxine ER (Effexor-XR) 24 hr cap 300 mg, 300 mg, Oral, Daily rivaroxaban (Xarelto) tab 20 mg, 20 mg, Oral, QPM    Review of Systems   Constitutional:  Positive for malaise/fatigue. Mental Status Exam:     Objective       08/31/23  1013   BP:    Pulse: 58   Resp:    Temp:      Appearance: normal grooming  Behavior: normal psychomotor  Attitude: cooperative and consistent  Gait: not observed    Speech: normal rate, rhythm and volume    Mood: depressed and anxious  Affect: Congruent    Thought process: logical and sequential  Thought content: no delusions  Perceptions: no hallucinations  Associations: Intact    Orientation: Alert and oriented x 4  Attention and Concentration: fair  Memory:  intact recent and intact remote  Language: Intact naming and repetition  Fund of Knowledge: Able to recite name of [place] president    Insight: fair  Judgment: fair    Laboratory Data:  Lab Results   Component Value Date    WBC 7.7 08/30/2023    HGB 11.7 08/30/2023    HCT 37.5 08/30/2023    .0 08/30/2023    CREATSERUM 0.88 08/30/2023    BUN 12 08/30/2023     08/30/2023    K 3.7 08/30/2023     08/30/2023    CO2 28.0 08/30/2023    GLU 95 08/30/2023    CA 9.4 08/30/2023    ALB 3.4 08/30/2023    ALKPHO 112 08/30/2023    BILT 0.3 08/30/2023    TP 7.3 08/30/2023    AST 12 08/30/2023    ALT 13 08/30/2023    PGLU 95 08/31/2023       STUDIES:    8/30/23  HEAD CT  FINDINGS:   No evidence of intracranial hemorrhage or extra-axial fluid collection. Lucencies in the deep periventricular white matter are likely sequelae of chronic small vessel ischemic disease. Volume loss in the frontal lobes is noted. Prominence of the sulci is noted. Mild prominence of the lateral ventricles. No mass effect. Visualized portions of paranasal sinuses are unremarkable. Visualized portions of the mastoid air cells are unremarkable. Visualized portions of the orbits are unremarkable. IMPRESSION:   Sequelae of chronic small vessel ischemic disease is noted.  No evidence of intracranial hemorrhage or extra-axial fluid collection.

## 2023-08-31 NOTE — OCCUPATIONAL THERAPY NOTE
OT orders received, chart reviewed. Attempted to see patient for OT eval this pm, however patient just receiving meal, politely requesting therapist return later. Second attempt in pm, patient reporting LE pain, requesting repositioning in bed for comfort, prefers therapist return tomorrow. Assisted patient with repositioning. Will reattempt tomorrow as able. RN aware of attempts.

## 2023-08-31 NOTE — RESPIRATORY THERAPY NOTE
Pt states that she recently started wearing a CPAP but she does not wear it every night. Pt was having a hard time telling me whether or not she wanted to wear a CPAP tonight because she was very drowsy. CPAP Protocol in place if pt decides she wants to wear a CPAP at a later time.

## 2023-08-31 NOTE — PROGRESS NOTES
Received patient from ED   Oriented x2-3  Safety precautions initiated   Room air wears c-pap at night,   Refused C-pap placed on O2 2lnc  Placed    Pt updated on poc   Iv antibiotics per orders   Call light is within reach

## 2023-08-31 NOTE — CM/SW NOTE
Order noted for PHQ-4, MSW will defer to Psych Liaison Consult. Per chart pt was at [address] in May 2023. MSW called and  per admissions staff pt  was only at at facility for about a week and they would accept a referral if appropriate. PASRR loaded    11:20am  Update: PT rec is [facility]  Referrals sent, MSW to f/u with pt/family for dc planning. [facility] called, updates sent, will need DONALD    12:15pm  MSW attempted to see pt but Rn suggested MSW call pt's dtr. MSW spoke to pt's dtr. Pt had stay at [facility] and [facility]. Pt's dtr wants [facility], we discussed that pt has to be agreeable. Senior Service info emailed to [employer].     [address]  Phone: [phone] [address] [place], [place], [address]

## 2023-08-31 NOTE — ED QUICK NOTES
Patient assisted to bathroom to provide urine specimen. Required wheelchair and assist to stand and pivot.

## 2023-08-31 NOTE — ED QUICK NOTES
Patient having hallucinations at home, seeing bloody people outside her window, per note to PCP on 8/24. Daughter has been speaking with PCP about placing patient in Assisted Living, concern for ability to care for herself. Patient has UA done by Home Health RN this week. Was told to go to ER because Urine Culture showed Klebsiella. Patient states she's having urinary urgency and has noticed dark urine in toilet. States she's been falling at home due to tripping over nahid. States her son recently moved in and states her son is to blame for the falls.

## 2023-09-01 PROBLEM — F33.1 MAJOR DEPRESSIVE DISORDER, RECURRENT, MODERATE (HCC): Status: ACTIVE | Noted: 2023-09-01

## 2023-09-01 LAB
EST. AVERAGE GLUCOSE BLD GHB EST-MCNC: 114 MG/DL (ref 68–126)
HBA1C MFR BLD: 5.6 % (ref ?–5.7)
POTASSIUM SERPL-SCNC: 4.2 MMOL/L (ref 3.5–5.1)

## 2023-09-01 PROCEDURE — 99232 SBSQ HOSP IP/OBS MODERATE 35: CPT | Performed by: HOSPITALIST

## 2023-09-01 RX ORDER — GABAPENTIN 300 MG/1
600 CAPSULE ORAL 2 TIMES DAILY
Qty: 90 CAPSULE | Refills: 2 | Status: SHIPPED | OUTPATIENT
Start: 2023-09-01

## 2023-09-01 RX ORDER — QUETIAPINE FUMARATE 50 MG/1
50 TABLET, FILM COATED ORAL NIGHTLY
Qty: 90 TABLET | Refills: 2 | Status: SHIPPED | OUTPATIENT
Start: 2023-09-01

## 2023-09-01 NOTE — CM/SW NOTE
CM provided patient with [facility] choice list; Patient inquired about [address]. CM encouraged patient to review list and let CM know choice today so insurance authorization can be requested. CM informed patient [address] services will be resumed if discharged home. CM sent referral to [facility] for review and will follow up for response in one hour. Update:  1530: [facility] cannot accept patient for [facility].       Delphine Ashley, RN Case Manager [id number]

## 2023-09-01 NOTE — PHYSICAL THERAPY NOTE
PHYSICAL THERAPY TREATMENT NOTE - INPATIENT    Room Number: 2455/5535-U     Session: 1     Number of Visits to Meet Established Goals: 3    Presenting Problem: UTI, hallucinations  Co-Morbidities : chronic low back pain, bipolar disorder, A-fib on DOAC, CHF, depression, DMII, HTN, migraines      History related to current admission: Patient is a 60year old female admitted on 8/30/2023 from home for generalized weakness, falls and dysuria. Pt diagnosed with UTI and hallucinations. Previous Admits:  3/24/23-3/26/23 with intractable back pain; discharged home  5/1/23-5/6/23 with intractable back pain; discharged home  5/14/23-5/25/23 with intractable back pain; discharged to [address])  6/15/23-6/26/23 at [facility], with closed head injury, cellulitis of LE; discharged to [place] [address] [address])      ASSESSMENT   Patient exhibits progress in functional mobility and activity tolerance. Needs cues to redirect to task, min assist to stand from the bed and min assist to ambulate 40' with RW. Patient needs cues for proper posture stability and rolling walker management. Distance limited to 36' due to balance progressively worsen with fatigue. At this time recommend [facility] to improve strength, endurance, balance and activity tolerance. Improved cognition noted. DISCHARGE RECOMMENDATIONS  PT Discharge Recommendations: Sub-acute rehabilitation     PLAN  PT Treatment Plan: Bed mobility; Endurance; Patient education; Family education;Gait training;Strengthening;Transfer training;Balance training; Body mechanics  Rehab Potential : Good  Frequency (Obs): 3-5x/week    CURRENT GOALS     Goal #1 Patient is able to demonstrate supine - sit EOB @ level: minimum assistance      Goal #2 Patient is able to demonstrate transfers Sit to/from Stand at assistance level: minimum assistance      Goal #3 Patient is able to ambulate 50 feet with assist device: walker - rolling at assistance level: minimum assistance      Goal #4     Goal #5     Goal #6     Goal Comments: Goals established on 2023 all goals ongoing. SUBJECTIVE  \" I need a robe. \"    OBJECTIVE  Precautions: Bed/chair alarm    WEIGHT BEARING RESTRICTION  Weight Bearing Restriction: None                PAIN ASSESSMENT   Ratin  Location: denies  Management Techniques: Activity promotion;Relaxation;Repositioning    BALANCE                                                                                                                       Static Sitting: Fair  Dynamic Sitting: Fair -           Static Standing: Poor +  Dynamic Standing: Poor +    ACTIVITY TOLERANCE                         O2 WALK         AM-PAC '6-Clicks' INPATIENT SHORT FORM - BASIC MOBILITY  How much difficulty does the patient currently have. .. Patient Difficulty: Turning over in bed (including adjusting bedclothes, sheets and blankets)?: A Little   Patient Difficulty: Sitting down on and standing up from a chair with arms (e.g., wheelchair, bedside commode, etc.): A Little   Patient Difficulty: Moving from lying on back to sitting on the side of the bed?: A Little   How much help from another person does the patient currently need. .. Help from Another: Moving to and from a bed to a chair (including a wheelchair)?: A Little   Help from Another: Need to walk in hospital room?: A Little   Help from Another: Climbing 3-5 steps with a railing?: Total       AM-PAC Score:  Raw Score: 16   Approx Degree of Impairment: 54.16%   Standardized Score (AM-PAC Scale): 40.78   CMS Modifier (G-Code): CK    FUNCTIONAL ABILITY STATUS  Gait Assessment   Functional Mobility/Gait Assessment  Gait Assistance: Minimum assistance  Distance (ft): 40  Assistive Device: Rolling walker  Pattern: Shuffle    Skilled Therapy Provided    Bed Mobility:  Rolling: sup   Supine<>Sit: sup   Sit<>Supine: NT     Transfer Mobility:  Sit<>Stand: min assist   Stand<>Sit: min assist   Gait: min assist and RW.  Chair follow provided but not needed. Pt tends to push walker far out, needs cues to keep it in close proximity. Pt shows dec jie, increased shuffle after 25', forward flexed and dec step length. Therapist's Comments: Needs continuous redirection to focus on task. Longer time to complete task. THERAPEUTIC EXERCISES  Lower Extremity Ankle pumps  LAQ     Upper Extremity      Position Sitting     Repetitions   12   Sets   1     Patient End of Session: Up in chair;Needs met;Call light within reach;RN aware of session/findings; All patient questions and concerns addressed; Alarm set    PT Session Time: 23 minutes  Gait Training: 10 minutes  Therapeutic Activity: 10 minutes  Therapeutic Exercise: 3 minutes   Neuromuscular Re-education: 0 minutes

## 2023-09-01 NOTE — PLAN OF CARE
Assumed care at 0730. A&O 3-4. Room air. Fluids- Scheduled Cefepime at 25 mL/hr. See MAR and managed orders for more details. Regular diet. Tele- SB/NSR/A fib. VTE- Xarelto. See MAR for more details. PRN pain meds utilized this shift. See MAR for more details. 2 assist with the walker to the bathroom. PT valentina completed this shift and recommending [facility]. See note for more details. Pt oriented to the room, safety prec initiated, bed is in the lowest position and call light within reach. Pt updated on the plan of care. Continued to monitor. Problem: NEUROLOGICAL - ADULT  Goal: Achieves stable or improved neurological status  Description: INTERVENTIONS  - Assess for and report changes in neurological status  - Initiate measures to prevent increased intracranial pressure  - Maintain blood pressure and fluid volume within ordered parameters to optimize cerebral perfusion and minimize risk of hemorrhage  - Monitor temperature, glucose, and sodium.  Initiate appropriate interventions as ordered  Outcome: Progressing  Goal: Achieves maximal functionality and self care  Description: INTERVENTIONS  - Monitor swallowing and airway patency with patient fatigue and changes in neurological status  - Encourage and assist patient to increase activity and self care with guidance from PT/OT  - Encourage visually impaired, hearing impaired and aphasic patients to use assistive/communication devices  Outcome: Progressing     Problem: Impaired Functional Mobility  Goal: Achieve highest/safest level of mobility/gait  Description: Interventions:  - Assess patient's functional ability and stability  - Promote increasing activity/tolerance for mobility and gait  - Educate and engage patient/family in tolerated activity level and precautions  - Recommend use of chair position in bed 3 times per day  Outcome: Progressing     Problem: Impaired Activities of Daily Living  Goal: Achieve highest/safest level of independence in self care  Description: Interventions:  - Assess ability and encourage patient to participate in ADLs to maximize function  - Promote sitting position while performing ADLs such as feeding, grooming, and bathing  - Educate and encourage patient/family in tolerated functional activity level and precautions during self-care  - Encourage patient to incorporate impaired side during daily activities to promote function  Outcome: Progressing     Problem: Delirium  Goal: Minimize duration of delirium  Description: Interventions:  - Encourage use of hearing aids, eye glasses  - Promote highest level of mobility daily  - Provide frequent reorientation  - Promote wakefulness i.e. lights on, blinds open  - Promote sleep, encourage patient's normal rest cycle i.e. lights off, TV off, minimize noise and interruptions  - Encourage family to assist in orientation and promotion of home routines  Outcome: Progressing

## 2023-09-01 NOTE — PLAN OF CARE
Pt is A&O x3-4 (forgetful about situation). Currently denies hallucinations and does not appear disorganized. VSS- NSR on tele. RA during the day. Hx ADRIANA- refusing CPAP but using 2L @ noc. IV cefepime q8hr. [place] Ghulam in place for incontinence. PRN pain medication for chronic back pain. Pt up with 2 assist, stand and pivot. PT rec [facility]. Psych following case. Problem: NEUROLOGICAL - ADULT  Goal: Achieves stable or improved neurological status  Description: INTERVENTIONS  - Assess for and report changes in neurological status  - Initiate measures to prevent increased intracranial pressure  - Maintain blood pressure and fluid volume within ordered parameters to optimize cerebral perfusion and minimize risk of hemorrhage  - Monitor temperature, glucose, and sodium.  Initiate appropriate interventions as ordered  Outcome: Progressing  Goal: Achieves maximal functionality and self care  Description: INTERVENTIONS  - Monitor swallowing and airway patency with patient fatigue and changes in neurological status  - Encourage and assist patient to increase activity and self care with guidance from PT/OT  - Encourage visually impaired, hearing impaired and aphasic patients to use assistive/communication devices  Outcome: Progressing     Problem: Impaired Functional Mobility  Goal: Achieve highest/safest level of mobility/gait  Description: Interventions:  - Assess patient's functional ability and stability  - Promote increasing activity/tolerance for mobility and gait  - Educate and engage patient/family in tolerated activity level and precautions  - Recommend use of sit-stand lift for transfers  Outcome: Progressing     Problem: Impaired Activities of Daily Living  Goal: Achieve highest/safest level of independence in self care  Description: Interventions:  - Assess ability and encourage patient to participate in ADLs to maximize function  - Promote sitting position while performing ADLs such as feeding, grooming, and bathing  - Educate and encourage patient/family in tolerated functional activity level and precautions during self-care  Outcome: Progressing     Problem: Delirium  Goal: Minimize duration of delirium  Description: Interventions:  - Encourage use of hearing aids, eye glasses  - Promote highest level of mobility daily  - Provide frequent reorientation  - Promote wakefulness i.e. lights on, blinds open  - Promote sleep, encourage patient's normal rest cycle i.e. lights off, TV off, minimize noise and interruptions  - Encourage family to assist in orientation and promotion of home routines  Outcome: Progressing

## 2023-09-02 PROCEDURE — 99232 SBSQ HOSP IP/OBS MODERATE 35: CPT | Performed by: HOSPITALIST

## 2023-09-02 NOTE — CM/SW NOTE
Spoke with pt, agreeable to [employer]. Reserved in [address]. Updates sent. SW requested [employer] to submit for insurance auth. Updated pt, informed pt that insurance will take a few days to approve with the holiday weekend. Pt appreciative.      NERI Doherty, [facility]  Discharge Planner  [id number]

## 2023-09-02 NOTE — CM/SW NOTE
Followed up with pt regarding choice list of [facility]. Pt stillr reviewing options. Explained pt needs to pick a [facility] facility so that insurance auth can be obtained.

## 2023-09-02 NOTE — PLAN OF CARE
Pt is A&O x3-4 (forgetful about situation). Currently denies hallucinations and does not appear disorganized. VSS- currently Afib on tele but will flip into NSR/SB. RA during the day. Hx ADRIANA- refusing CPAP but using 2L @ noc. José Antonio Inks in place for incontinence. PRN pain medication for chronic back pain. Pt up with 1-2 assist, stand and pivot. PT rec [facility]- medically cleared but awaiting placement  Psych following case. Problem: NEUROLOGICAL - ADULT  Goal: Achieves stable or improved neurological status  Description: INTERVENTIONS  - Assess for and report changes in neurological status  - Initiate measures to prevent increased intracranial pressure  - Maintain blood pressure and fluid volume within ordered parameters to optimize cerebral perfusion and minimize risk of hemorrhage  - Monitor temperature, glucose, and sodium.  Initiate appropriate interventions as ordered  Outcome: Progressing  Goal: Achieves maximal functionality and self care  Description: INTERVENTIONS  - Monitor swallowing and airway patency with patient fatigue and changes in neurological status  - Encourage and assist patient to increase activity and self care with guidance from PT/OT  - Encourage visually impaired, hearing impaired and aphasic patients to use assistive/communication devices  Outcome: Progressing     Problem: Impaired Functional Mobility  Goal: Achieve highest/safest level of mobility/gait  Description: Interventions:  - Assess patient's functional ability and stability  - Promote increasing activity/tolerance for mobility and gait  - Educate and engage patient/family in tolerated activity level and precautions  Outcome: Progressing     Problem: Impaired Activities of Daily Living  Goal: Achieve highest/safest level of independence in self care  Description: Interventions:  - Assess ability and encourage patient to participate in ADLs to maximize function  - Promote sitting position while performing ADLs such as feeding, grooming, and bathing  - Educate and encourage patient/family in tolerated functional activity level and precautions during self-care  Outcome: Progressing     Problem: Delirium  Goal: Minimize duration of delirium  Description: Interventions:  - Encourage use of hearing aids, eye glasses  - Promote highest level of mobility daily  - Provide frequent reorientation  - Promote wakefulness i.e. lights on, blinds open  - Promote sleep, encourage patient's normal rest cycle i.e. lights off, TV off, minimize noise and interruptions  - Encourage family to assist in orientation and promotion of home routines  Outcome: Progressing

## 2023-09-02 NOTE — PROGRESS NOTES
Assumed care art 7:30 am.   Patient is alert and oriented x2-3. States she had back pain, Oxy given. States her pain is a lot better. States she chose a [facility] locations. Pending insurance now. All safety precautions in place. Will continue to monitor patient.

## 2023-09-03 PROCEDURE — 99232 SBSQ HOSP IP/OBS MODERATE 35: CPT | Performed by: HOSPITALIST

## 2023-09-03 NOTE — PROGRESS NOTES
Assumed care at 7:30am.   Patient is laying in bed, RA. Denies current pain. Able to ambulate with walker and one person assist.   Still waiting on authorization for [facility]  All safety precautions in place. Will continue to monitor patient.

## 2023-09-03 NOTE — CM/SW NOTE
SW sent PT/OT notes in [facility] referral. Due to holiday weekend, do not anticipate insurance being approved. Avoidable days entered due to payer barriers.      LENA Gallardo  Discharge Planner  [id number]

## 2023-09-03 NOTE — PLAN OF CARE
Problem: SAFETY ADULT - FALL  Goal: Free from fall injury  Description: INTERVENTIONS:  - Assess pt frequently for physical needs  - Identify cognitive and physical deficits and behaviors that affect risk of falls. - [place] fall precautions as indicated by assessment.  - Educate pt/family on patient safety including physical limitations  - Instruct pt to call for assistance with activity based on assessment  - Modify environment to reduce risk of injury  - Provide assistive devices as appropriate  - Consider OT/PT consult to assist with strengthening/mobility  - Encourage toileting schedule  Outcome: Progressing     Assume care @ 1930  Patient AOX3, 2L NC O2, Tele-A. Fib and VSS  Up with W/1A, refused CPAP  Oxycodone PRN given for c/o pain

## 2023-09-04 PROCEDURE — 99232 SBSQ HOSP IP/OBS MODERATE 35: CPT | Performed by: HOSPITALIST

## 2023-09-04 NOTE — PLAN OF CARE
Assumed care at 0730  A/O x4. Rambles at times. Pleasant. Disoriented to situation at times. RA  NSR. Hx afib. On xarelto. R forearm IC c/d/I. Dressing changed today. Saline locked. Up 1 with walker  Complains of arthritic pain. PRN percocet given per MAR. General diet  Waiting for [facility] placement  All needs met. Pt updated on poc. Will continue with plan of care. Problem: NEUROLOGICAL - ADULT  Goal: Achieves stable or improved neurological status  Description: INTERVENTIONS  - Assess for and report changes in neurological status  - Initiate measures to prevent increased intracranial pressure  - Maintain blood pressure and fluid volume within ordered parameters to optimize cerebral perfusion and minimize risk of hemorrhage  - Monitor temperature, glucose, and sodium.  Initiate appropriate interventions as ordered  Outcome: Progressing  Goal: Achieves maximal functionality and self care  Description: INTERVENTIONS  - Monitor swallowing and airway patency with patient fatigue and changes in neurological status  - Encourage and assist patient to increase activity and self care with guidance from PT/OT  - Encourage visually impaired, hearing impaired and aphasic patients to use assistive/communication devices  Outcome: Progressing     Problem: Impaired Functional Mobility  Goal: Achieve highest/safest level of mobility/gait  Description: Interventions:  - Assess patient's functional ability and stability  - Promote increasing activity/tolerance for mobility and gait  - Educate and engage patient/family in tolerated activity level and precautions  - Recommend use of  RW for transfers and ambulation  Outcome: Progressing     Problem: Delirium  Goal: Minimize duration of delirium  Description: Interventions:  - Encourage use of hearing aids, eye glasses  - Promote highest level of mobility daily  - Provide frequent reorientation  - Promote wakefulness i.e. lights on, blinds open  - Promote sleep, encourage patient's normal rest cycle i.e. lights off, TV off, minimize noise and interruptions  - Encourage family to assist in orientation and promotion of home routines  Outcome: Progressing     Problem: SAFETY ADULT - FALL  Goal: Free from fall injury  Description: INTERVENTIONS:  - Assess pt frequently for physical needs  - Identify cognitive and physical deficits and behaviors that affect risk of falls.   - [place] fall precautions as indicated by assessment.  - Educate pt/family on patient safety including physical limitations  - Instruct pt to call for assistance with activity based on assessment  - Modify environment to reduce risk of injury  - Provide assistive devices as appropriate  - Consider OT/PT consult to assist with strengthening/mobility  - Encourage toileting schedule  Outcome: Progressing

## 2023-09-04 NOTE — PLAN OF CARE
Pt is A&O x3-4. VSS- NSR but flips into Afib sometimes. On Xarelto. RA. Pt refused CPAP. 2L applied overnight. PRN percocet given for pain. Pt up with 1 assist and walker to BR. General diet. Awaiting K[facility] placement. Problem: NEUROLOGICAL - ADULT  Goal: Achieves stable or improved neurological status  Description: INTERVENTIONS  - Assess for and report changes in neurological status  - Initiate measures to prevent increased intracranial pressure  - Maintain blood pressure and fluid volume within ordered parameters to optimize cerebral perfusion and minimize risk of hemorrhage  - Monitor temperature, glucose, and sodium.  Initiate appropriate interventions as ordered  Outcome: Progressing  Goal: Achieves maximal functionality and self care  Description: INTERVENTIONS  - Monitor swallowing and airway patency with patient fatigue and changes in neurological status  - Encourage and assist patient to increase activity and self care with guidance from PT/OT  - Encourage visually impaired, hearing impaired and aphasic patients to use assistive/communication devices  Outcome: Progressing     Problem: Impaired Functional Mobility  Goal: Achieve highest/safest level of mobility/gait  Description: Interventions:  - Assess patient's functional ability and stability  - Promote increasing activity/tolerance for mobility and gait  - Educate and engage patient/family in tolerated activity level and precautions  Outcome: Progressing     Problem: Impaired Activities of Daily Living  Goal: Achieve highest/safest level of independence in self care  Description: Interventions:  - Assess ability and encourage patient to participate in ADLs to maximize function  - Promote sitting position while performing ADLs such as feeding, grooming, and bathing  - Educate and encourage patient/family in tolerated functional activity level and precautions during self-care  Outcome: Progressing     Problem: Delirium  Goal: Minimize duration of delirium  Description: Interventions:  - Encourage use of hearing aids, eye glasses  - Promote highest level of mobility daily  - Provide frequent reorientation  - Promote wakefulness i.e. lights on, blinds open  - Promote sleep, encourage patient's normal rest cycle i.e. lights off, TV off, minimize noise and interruptions  - Encourage family to assist in orientation and promotion of home routines  Outcome: Progressing     Problem: SAFETY ADULT - FALL  Goal: Free from fall injury  Description: INTERVENTIONS:  - Assess pt frequently for physical needs  - Identify cognitive and physical deficits and behaviors that affect risk of falls.   - [place] fall precautions as indicated by assessment.  - Educate pt/family on patient safety including physical limitations  - Instruct pt to call for assistance with activity based on assessment  - Modify environment to reduce risk of injury  - Provide assistive devices as appropriate  - Consider OT/PT consult to assist with strengthening/mobility  - Encourage toileting schedule  Outcome: Progressing

## 2023-09-05 PROCEDURE — 99232 SBSQ HOSP IP/OBS MODERATE 35: CPT | Performed by: HOSPITALIST

## 2023-09-05 NOTE — PLAN OF CARE
Pt is A&O x3-4. VSS- NSR/SB on tele (occasional Afib). On Xarelto. RA. CPAP @ noc. PRN percocet given for pain. Pt up with 1 assist and walker to BR. General diet. Awaiting K[facility] placement. Problem: NEUROLOGICAL - ADULT  Goal: Achieves stable or improved neurological status  Description: INTERVENTIONS  - Assess for and report changes in neurological status  - Initiate measures to prevent increased intracranial pressure  - Maintain blood pressure and fluid volume within ordered parameters to optimize cerebral perfusion and minimize risk of hemorrhage  - Monitor temperature, glucose, and sodium.  Initiate appropriate interventions as ordered  Outcome: Progressing  Goal: Achieves maximal functionality and self care  Description: INTERVENTIONS  - Monitor swallowing and airway patency with patient fatigue and changes in neurological status  - Encourage and assist patient to increase activity and self care with guidance from PT/OT  - Encourage visually impaired, hearing impaired and aphasic patients to use assistive/communication devices  Outcome: Progressing     Problem: Impaired Functional Mobility  Goal: Achieve highest/safest level of mobility/gait  Description: Interventions:  - Assess patient's functional ability and stability  - Promote increasing activity/tolerance for mobility and gait  - Educate and engage patient/family in tolerated activity level and precautions  Outcome: Progressing     Problem: Impaired Activities of Daily Living  Goal: Achieve highest/safest level of independence in self care  Description: Interventions:  - Assess ability and encourage patient to participate in ADLs to maximize function  - Promote sitting position while performing ADLs such as feeding, grooming, and bathing  - Educate and encourage patient/family in tolerated functional activity level and precautions during self-care  Outcome: Progressing     Problem: Delirium  Goal: Minimize duration of delirium  Description: Interventions:  - Encourage use of hearing aids, eye glasses  - Promote highest level of mobility daily  - Provide frequent reorientation  - Promote wakefulness i.e. lights on, blinds open  - Promote sleep, encourage patient's normal rest cycle i.e. lights off, TV off, minimize noise and interruptions  - Encourage family to assist in orientation and promotion of home routines  Outcome: Progressing     Problem: SAFETY ADULT - FALL  Goal: Free from fall injury  Description: INTERVENTIONS:  - Assess pt frequently for physical needs  - Identify cognitive and physical deficits and behaviors that affect risk of falls.   - [place] fall precautions as indicated by assessment.  - Educate pt/family on patient safety including physical limitations  - Instruct pt to call for assistance with activity based on assessment  - Modify environment to reduce risk of injury  - Provide assistive devices as appropriate  - Consider OT/PT consult to assist with strengthening/mobility  - Encourage toileting schedule  Outcome: Progressing

## 2023-09-05 NOTE — CM/SW NOTE
DYLLAN spoke with Venus Lo at [employer] regarding status of insurance authorization. Insurance authorization is still pending at this time. Updated PT note attached to [address] referral. Jaya Malone will continue to follow. 2:30 pm - DYLLAN spoke with Venus Lo at [employer], insurance authorization is still pending at this time.     LENA Solis  Discharge Planner

## 2023-09-05 NOTE — PHYSICAL THERAPY NOTE
PHYSICAL THERAPY TREATMENT NOTE - INPATIENT    Room Number: 3566/7711-I     Session: 2    Number of Visits to Meet Established Goals: 3    Presenting Problem: UTI, hallucinations  Co-Morbidities : Low back pain      History related to current admission: Patient is a 60year old female admitted on 8/30/2023 from home for generalized weakness, falls and dysuria. Pt diagnosed with UTI and hallucinations. Previous Admits:  3/24/23-3/26/23 with intractable back pain; discharged home  5/1/23-5/6/23 with intractable back pain; discharged home  5/14/23-5/25/23 with intractable back pain; discharged to [address])  6[place]15[place] [facility], cellulitis of LE; discharged to [address])      ASSESSMENT   Pt eager to get out of bed to chair for brunch. Pt still requiring Min A for all mobility at this time, even with use of RW. Pt assisted with transfer up to commode - required total A for keyona care/donning new underwear. Recommend one person with RW - up to chair or commode. Pt would require chair follow for longer distance ambulation with nursing staff. At this time, Pt. presents with decreased balance, impaired strength, difficulty with gait/transfers resulting in downgrade of overall functional mobility. Due to above deficits, Pt will benefit from continued IP PT, so that patient may achieve highest functional independence/return to baseline. DISCHARGE RECOMMENDATIONS  PT Discharge Recommendations: Sub-acute rehabilitation     PLAN  PT Treatment Plan: Bed mobility; Endurance; Patient education; Family education;Gait training;Strengthening;Transfer training;Balance training; Body mechanics  Rehab Potential : Good  Frequency (Obs): 3-5x/week    CURRENT GOALS     Goal #1 Patient is able to demonstrate supine - sit EOB @ level: minimum assistance      Goal #2 Patient is able to demonstrate transfers Sit to/from Stand at assistance level: minimum assistance      Goal #3 Patient is able to ambulate 50 feet with assist device: walker - rolling at assistance level: minimum assistance      Goal #4     Goal #5     Goal #6     Goal Comments: Goals established on 8/31/2023 9/5/2023 all goals ongoing. SUBJECTIVE  \"Thanks for listening to me\"    OBJECTIVE  Precautions: Bed/chair alarm    WEIGHT BEARING RESTRICTION  Weight Bearing Restriction: None                PAIN ASSESSMENT   Rating: Unable to rate  Location: chronic L shoulder  Management Techniques: Activity promotion;Relaxation;Repositioning    BALANCE                                                                                                                       Static Sitting: Fair  Dynamic Sitting: Fair -           Static Standing: Poor +  Dynamic Standing: Poor +    ACTIVITY TOLERANCE                         O2 WALK         AM-PAC '6-Clicks' INPATIENT SHORT FORM - BASIC MOBILITY  How much difficulty does the patient currently have. .. Patient Difficulty: Turning over in bed (including adjusting bedclothes, sheets and blankets)?: A Little   Patient Difficulty: Sitting down on and standing up from a chair with arms (e.g., wheelchair, bedside commode, etc.): A Little   Patient Difficulty: Moving from lying on back to sitting on the side of the bed?: A Little   How much help from another person does the patient currently need. ..    Help from Another: Moving to and from a bed to a chair (including a wheelchair)?: A Little   Help from Another: Need to walk in hospital room?: A Little   Help from Another: Climbing 3-5 steps with a railing?: Total       AM-PAC Score:  Raw Score: 16   Approx Degree of Impairment: 54.16%   Standardized Score (AM-PAC Scale): 40.78   CMS Modifier (G-Code): CK    FUNCTIONAL ABILITY STATUS  Gait Assessment   Functional Mobility/Gait Assessment  Gait Assistance: Minimum assistance  Distance (ft): 5 x 2  Assistive Device: Rolling walker  Pattern: Shuffle    Skilled Therapy Provided    Bed Mobility:  Rolling: sup   Supine<>Sit: Min A (poor trunk management)   Sit<>Supine: NT     Transfer Mobility:  Sit<>Stand: min assist   Stand<>Sit: min assist   Gait: min assist and RW. Therapist's Comments: Needs continuous redirection to focus on task - easily distracted by telling \"old stories\". Increased time for toileting and supervision provided as pt remains impulsive/fall risk. THERAPEUTIC EXERCISES  Lower Extremity Ankle pumps  LAQ     Upper Extremity      Position Sitting     Repetitions      Sets        Patient End of Session: Up in chair;Needs met;Call light within reach;RN aware of session/findings; All patient questions and concerns addressed; Alarm set    PT Session Time: 23 minutes  Gait Trainin minutes  Therapeutic Activity: 15 minutes  Therapeutic Exercise: 0 minutes   Neuromuscular Re-education: 0 minutes

## 2023-09-05 NOTE — PLAN OF CARE
Assumed care at 0730  A&O X4, rambling speech with long pauses   RA, CPAP at night   VSS  NSR, occasionally flip into afib, spontaneously resolves   Lab draw  PIV R forearm C/D/I and saline locked  Last BM yesterday  Regular diet   1 assist with walker, pt up in chair after PT   Placement pending with [facility]     Problem: NEUROLOGICAL - ADULT  Goal: Achieves stable or improved neurological status  Description: INTERVENTIONS  - Assess for and report changes in neurological status  - Initiate measures to prevent increased intracranial pressure  - Maintain blood pressure and fluid volume within ordered parameters to optimize cerebral perfusion and minimize risk of hemorrhage  - Monitor temperature, glucose, and sodium.  Initiate appropriate interventions as ordered  Outcome: Progressing  Goal: Achieves maximal functionality and self care  Description: INTERVENTIONS  - Monitor swallowing and airway patency with patient fatigue and changes in neurological status  - Encourage and assist patient to increase activity and self care with guidance from PT/OT  - Encourage visually impaired, hearing impaired and aphasic patients to use assistive/communication devices  Outcome: Progressing     Problem: Impaired Functional Mobility  Goal: Achieve highest/safest level of mobility/gait  Description: Interventions:  - Assess patient's functional ability and stability  - Promote increasing activity/tolerance for mobility and gait  - Educate and engage patient/family in tolerated activity level and precautions  - Recommend use of  RW for transfers and ambulation  Outcome: Progressing     Problem: Impaired Activities of Daily Living  Goal: Achieve highest/safest level of independence in self care  Description: Interventions:  - Assess ability and encourage patient to participate in ADLs to maximize function  - Promote sitting position while performing ADLs such as feeding, grooming, and bathing  - Educate and encourage patient/family in tolerated functional activity level and precautions during self-care  - Encourage patient to incorporate impaired side during daily activities to promote function  Outcome: Progressing     Problem: SAFETY ADULT - FALL  Goal: Free from fall injury  Description: INTERVENTIONS:  - Assess pt frequently for physical needs  - Identify cognitive and physical deficits and behaviors that affect risk of falls.   - [place] fall precautions as indicated by assessment.  - Educate pt/family on patient safety including physical limitations  - Instruct pt to call for assistance with activity based on assessment  - Modify environment to reduce risk of injury  - Provide assistive devices as appropriate  - Consider OT/PT consult to assist with strengthening/mobility  - Encourage toileting schedule  Outcome: Progressing

## 2023-09-06 PROCEDURE — 99232 SBSQ HOSP IP/OBS MODERATE 35: CPT | Performed by: HOSPITALIST

## 2023-09-06 NOTE — CM/SW NOTE
CM called Admissions Liaison at [facility] for insurance authorization status update, left message for call back. 1200: CM informed by facility admissions insurance authorization is still pending. 1330: Cm informed [facility]  has escalated authorization request to management. 1500: CM called [employer] at [phone] for update on authorization status; unable to obtain update without request ID [facility] CM submitted request). CM spoke to Care Coordination Supervisor for update and additional assistance. SW/CM to remain available for any further discharge planning needs.    Leanor Duty, RN Case Manager [id number]

## 2023-09-06 NOTE — PLAN OF CARE
VSS, NSR, room air, CPAP at night  Patient up to chair with walker and 1 assist  Worked with OT today  Good appetite. Denies pain today but has PRN for chronic back/leg pain as needed  Awaiting insurance authorization for [employer].

## 2023-09-06 NOTE — PLAN OF CARE
Assumed care at [address]. No acute distress noted. Pt A&O3-4. Room air, CPAP @ [address] [place]. NSR, SB, Afib (occasionally) on tele. Regular diet. Incontinent. Ambulatory w/ walker x2, stand and pivot. BSC. Multiple falls recently prior to admission. PT/OT rec KAY. Meds per MAR. C/o knee pain. No n/v/d. Updated on [address] authorization pending at [facility]. SW following. Safety precautions in place. Needs met at this time. Problem: NEUROLOGICAL - ADULT  Goal: Achieves stable or improved neurological status  Description: INTERVENTIONS  - Assess for and report changes in neurological status  - Initiate measures to prevent increased intracranial pressure  - Maintain blood pressure and fluid volume within ordered parameters to optimize cerebral perfusion and minimize risk of hemorrhage  - Monitor temperature, glucose, and sodium.  Initiate appropriate interventions as ordered  Outcome: Progressing  Goal: Achieves maximal functionality and self care  Description: INTERVENTIONS  - Monitor swallowing and airway patency with patient fatigue and changes in neurological status  - Encourage and assist patient to increase activity and self care with guidance from PT/OT  - Encourage visually impaired, hearing impaired and aphasic patients to use assistive/communication devices  Outcome: Progressing     Problem: Impaired Functional Mobility  Goal: Achieve highest/safest level of mobility/gait  Description: Interventions:  - Assess patient's functional ability and stability  - Promote increasing activity/tolerance for mobility and gait  - Educate and engage patient/family in tolerated activity level and precautions  Outcome: Progressing     Problem: Impaired Activities of Daily Living  Goal: Achieve highest/safest level of independence in self care  Description: Interventions:  - Assess ability and encourage patient to participate in ADLs to maximize function  - Promote sitting position while performing ADLs such as feeding, grooming, and bathing  - Educate and encourage patient/family in tolerated functional activity level and precautions during self-care  Outcome: Progressing     Problem: SAFETY ADULT - FALL  Goal: Free from fall injury  Description: INTERVENTIONS:  - Assess pt frequently for physical needs  - Identify cognitive and physical deficits and behaviors that affect risk of falls.   - [place] fall precautions as indicated by assessment.  - Educate pt/family on patient safety including physical limitations  - Instruct pt to call for assistance with activity based on assessment  - Modify environment to reduce risk of injury  - Provide assistive devices as appropriate  - Consider OT/PT consult to assist with strengthening/mobility  - Encourage toileting schedule  Outcome: Progressing

## 2023-09-06 NOTE — PAYOR COMM NOTE
REQUEST FOR ASSISTANCE  Awaiting auth for sub acute rehab, patient is ready for discharge,  Need to expedite auth  Contact:  Keira Garcia @ [phone]

## 2023-09-07 PROCEDURE — 99232 SBSQ HOSP IP/OBS MODERATE 35: CPT | Performed by: HOSPITALIST

## 2023-09-07 NOTE — PHYSICAL THERAPY NOTE
Attempted to see Pt this AM - RN [place] aware of attempt. Pt currently on phone with her daughter - visibly upset and discussing family tensions. Emotional support provided. Will f/u later today if time permits, after all other patients are attempted per tentative schedule. Pt is a 73 y/o F from home with PMH of Colon CA (s/p right hemicolectomy on May 27,2022 here at [facility]), Afib (on Xarelto with micra transcatheter pacemaker implanted 7/17/2017), CAD (s/p CABG in 2017), Dementia, HTN, HLD, osteoporosis, T2DM, OA and PSH of left breast total mastectomy presents to the ED with melena started from day of admission.  Pt is a poor historian due to poor cognition and according to daughter, she had two episodes of black stools accompanied with abdominal pain.  Denied chest pain, shortness of breath, fever, headache, N/V, urinary complaints.  Admitted for GI bleeding, anemia requiring blood transfusion and treatment for hyperkalemia.     Pt was evaluated by surgery since sx hemicolectomy but did not require any surgical intervention.  Her initial labs showed a hemoglobin of 6.8 and received 2 units of PRBC, started on protonix and was followed by GI and underwent an EGD which reveal ulcer in the duodenal bulb and first part of the duodenum.          . Pt is a 73 yo F from home with PMH of Colon CA (s/p right hemicolectomy on May 27,2022), Afib (on Xarelto with micra transcatheter pacemaker implanted 7/17/2017), CAD (s/p CABG in 2017) , Dementia, HTN, HLD, osteoporosis, T2DM, OA and PSH of left breast total mastectomy who initially presented to the ED with melena.  She was admitted to [facility] on May 27th for right hemicolectomy for the diagnosis of colon CA. Hem/onc Dr Sandhu evaluated, pt to follow up outpt with Heme outpatient.   Patient admitted to medicine for GIB, s/p 2PRBC with improvement in H&H. Surgery, GI and Hem/onc following.  Colonoscopy not advisable as she underwent recent hemicolectomy. S/p EGD and tolerating clear liquids and therefore advanced to regular diet. Per GI patient not to resume AC at this time due to the size of the duodenal ulcer. Recommended to repeat EGD in 1 month to re-evaluate if safe to resume AC. Patient to continue Protonix BID.   Tolerating advanced diet,  pt was recommended for LIT, but pt and family refused LIT and wants to go home   Clinically improved, optimized for discharge with outpt follow up   Please note that this a brief summary of hospital course please refer to daily progress notes and consult notes for full course and events             .

## 2023-09-07 NOTE — PAYOR COMM NOTE
--------------  CONTINUED STAY REVIEW    Payor: [address] [place] [address] ALONSO/SUSAN  Subscriber #:  [id number]  Authorization Number: [id number]    Admit date: 8/30/23  Admit time: 11:28 PM    Admitting Physician: Melani Bailey MD  Attending Physician:  Geo Garcia*  Primary Care Physician: Meliza Darling DO    REVIEW DOCUMENTATION:    PLEASE CONTACT DC PLANNER REGARDING POST [address] [facility] [place] Jelly Monday [phone]    OR     [facility]  [phone]

## 2023-09-07 NOTE — CM/SW NOTE
Deon contacted to follow up on insurance authorization. Notified that Insurance CM was requesting additional PT/OT notes. Notes sent from the last 2 days, and PT messaged to request a visit today. They attempted to see pt this am but pt was not able to work with them at that time, they will re attempt to see pt. CM/SW will remain available for DC planning and/or support.      ADDISON Gutierrez, VIA [facility]    [id number]

## 2023-09-07 NOTE — PLAN OF CARE
Assumed care of 61 y/o female @1  A/Ox 3-4, RA Cpap @noc  Afib/NSR-Tele  Regular diet,   Incontinent at times  Gets up w/walker x2  Denied pain  RFA PIV-SL  Safety prec maintained and all needs met      Problem: NEUROLOGICAL - ADULT  Goal: Achieves stable or improved neurological status  Description: INTERVENTIONS  - Assess for and report changes in neurological status  - Initiate measures to prevent increased intracranial pressure  - Maintain blood pressure and fluid volume within ordered parameters to optimize cerebral perfusion and minimize risk of hemorrhage  - Monitor temperature, glucose, and sodium. Initiate appropriate interventions as ordered  Outcome: Progressing     Problem: SAFETY ADULT - FALL  Goal: Free from fall injury  Description: INTERVENTIONS:  - Assess pt frequently for physical needs  - Identify cognitive and physical deficits and behaviors that affect risk of falls.   - [place] fall precautions as indicated by assessment.  - Educate pt/family on patient safety including physical limitations  - Instruct pt to call for assistance with activity based on assessment  - Modify environment to reduce risk of injury  - Provide assistive devices as appropriate  - Consider OT/PT consult to assist with strengthening/mobility  - Encourage toileting schedule  Outcome: Progressing

## 2023-09-07 NOTE — PHYSICAL THERAPY NOTE
PHYSICAL THERAPY TREATMENT NOTE - INPATIENT    Room Number: 7499/3395-M       Session: 3    Number of Visits to Meet Established Goals: 3    Presenting Problem: UTI, hallucinations  Co-Morbidities : Low back pain      History related to current admission: Patient is a 60year old female admitted on 8/30/2023 from home for generalized weakness, falls and dysuria. Pt diagnosed with UTI and hallucinations. Previous Admits:  3/24/23-3/26/23 with intractable back pain; discharged home  5/1/23-5/6/23 with intractable back pain; discharged home  5/14/23-5/25/23 with intractable back pain; discharged to [address])  6[place]15[place] [facility], cellulitis of LE; discharged to [address])      ASSESSMENT   Pt still requiring Min A for all functional mobility with use of RW. Pt would benefit from up to chair for all meals. At this time, Pt. presents with decreased balance, impaired strength, difficulty with gait/transfers resulting in downgrade of overall functional mobility. Due to above deficits, Pt will benefit from continued IP PT, so that patient may achieve highest functional independence/return to baseline. DISCHARGE RECOMMENDATIONS  PT Discharge Recommendations: Sub-acute rehabilitation     PLAN  PT Treatment Plan: Bed mobility; Endurance; Patient education; Family education;Gait training;Strengthening;Transfer training;Balance training; Body mechanics  Rehab Potential : Good  Frequency (Obs): 3-5x/week    CURRENT GOALS     Goal #1 Patient is able to demonstrate supine - sit EOB @ level: minimum assistance      Goal #2 Patient is able to demonstrate transfers Sit to/from Stand at assistance level: minimum assistance      Goal #3 Patient is able to ambulate 50 feet with assist device: walker - rolling at assistance level: minimum assistance      Goal #4     Goal #5     Goal #6     Goal Comments: Goals established on 8/31/2023 9/7/2023 all goals ongoing. SUBJECTIVE  'this stuff is way too much\" - stressed by financial/family matters. OBJECTIVE  Precautions: Bed/chair alarm    WEIGHT BEARING RESTRICTION  Weight Bearing Restriction: None                PAIN ASSESSMENT   Rating: Unable to rate  Location: chronic pain  Management Techniques: Activity promotion;Relaxation;Repositioning    BALANCE                                                                                                                       Static Sitting: Fair  Dynamic Sitting: Fair -           Static Standing: Poor +  Dynamic Standing: Poor +    ACTIVITY TOLERANCE                         O2 WALK         AM-PAC '6-Clicks' INPATIENT SHORT FORM - BASIC MOBILITY  How much difficulty does the patient currently have. .. Patient Difficulty: Turning over in bed (including adjusting bedclothes, sheets and blankets)?: A Little   Patient Difficulty: Sitting down on and standing up from a chair with arms (e.g., wheelchair, bedside commode, etc.): A Little   Patient Difficulty: Moving from lying on back to sitting on the side of the bed?: A Little   How much help from another person does the patient currently need. .. Help from Another: Moving to and from a bed to a chair (including a wheelchair)?: A Little   Help from Another: Need to walk in hospital room?: A Little   Help from Another: Climbing 3-5 steps with a railing?: Total       AM-PAC Score:  Raw Score: 16   Approx Degree of Impairment: 54.16%   Standardized Score (AM-PAC Scale): 40.78   CMS Modifier (G-Code): CK    FUNCTIONAL ABILITY STATUS  Gait Assessment   Functional Mobility/Gait Assessment  Gait Assistance: Minimum assistance  Distance (ft): 50  Assistive Device: Rolling walker  Pattern: Shuffle    Skilled Therapy Provided    Bed Mobility:  Rolling: sup   Supine<>Sit: Min A (RLE mgmt)  Sit<>Supine: NT     Transfer Mobility:  Sit<>Stand: min A  Stand<>Sit: min A (poor eccentric lowering)   Gait: min assist and RW. Therapist's Comments: Needs continuous redirection to focus on task - remains distracted by family stressors, provided emotional support. THERAPEUTIC EXERCISES  Lower Extremity Ankle pumps  LAQ     Upper Extremity      Position Sitting     Repetitions      Sets        Patient End of Session: Up in chair;Needs met;Call light within reach;RN aware of session/findings; All patient questions and concerns addressed; Alarm set    PT Session Time: 45 minutes  Gait Trainin minutes  Therapeutic Activity: 30 minutes  Therapeutic Exercise: 0 minutes   Neuromuscular Re-education: 0 minutes

## 2023-09-08 VITALS
BODY MASS INDEX: 45.82 KG/M2 | DIASTOLIC BLOOD PRESSURE: 49 MMHG | HEART RATE: 66 BPM | OXYGEN SATURATION: 98 % | TEMPERATURE: 98 F | HEIGHT: 65 IN | WEIGHT: 275 LBS | SYSTOLIC BLOOD PRESSURE: 103 MMHG | RESPIRATION RATE: 19 BRPM

## 2023-09-08 NOTE — PLAN OF CARE
A&O X4 with rambling speech  RA  VSS  NSR and Afib, Afib spontaneously resolves   PIV in R forearm saline locked. Dressing changed, C/D/I   Ambulating with 1 staff, walker, and gait belt  Last BM yesterday  PRN percocet given at  for pain 9/10     Problem: NEUROLOGICAL - ADULT  Goal: Achieves stable or improved neurological status  Description: INTERVENTIONS  - Assess for and report changes in neurological status  - Initiate measures to prevent increased intracranial pressure  - Maintain blood pressure and fluid volume within ordered parameters to optimize cerebral perfusion and minimize risk of hemorrhage  - Monitor temperature, glucose, and sodium.  Initiate appropriate interventions as ordered  Outcome: Progressing  Goal: Achieves maximal functionality and self care  Description: INTERVENTIONS  - Monitor swallowing and airway patency with patient fatigue and changes in neurological status  - Encourage and assist patient to increase activity and self care with guidance from PT/OT  - Encourage visually impaired, hearing impaired and aphasic patients to use assistive/communication devices  Outcome: Progressing     Problem: Impaired Functional Mobility  Goal: Achieve highest/safest level of mobility/gait  Description: Interventions:  - Assess patient's functional ability and stability  - Promote increasing activity/tolerance for mobility and gait  - Educate and engage patient/family in tolerated activity level and precautions  - Recommend use of walker for transfers and ambulation  Outcome: Progressing     Problem: Impaired Activities of Daily Living  Goal: Achieve highest/safest level of independence in self care  Description: Interventions:  - Assess ability and encourage patient to participate in ADLs to maximize function  - Promote sitting position while performing ADLs such as feeding, grooming, and bathing  - Educate and encourage patient/family in tolerated functional activity level and precautions during self-care  - Encourage patient to incorporate impaired side during daily activities to promote function  Outcome: Progressing     Problem: SAFETY ADULT - FALL  Goal: Free from fall injury  Description: INTERVENTIONS:  - Assess pt frequently for physical needs  - Identify cognitive and physical deficits and behaviors that affect risk of falls.   - [place] fall precautions as indicated by assessment.  - Educate pt/family on patient safety including physical limitations  - Instruct pt to call for assistance with activity based on assessment  - Modify environment to reduce risk of injury  - Provide assistive devices as appropriate  - Consider OT/PT consult to assist with strengthening/mobility  - Encourage toileting schedule  Outcome: Progressing

## 2023-09-08 NOTE — CM/SW NOTE
Patient medically ready for discharge. Per Lopez Fink with [facility], insurance Melony Massey is still pending. She reached out to their  for updates and will notify our team when Amandeepa Shilpa has been received.      Darnell Mead RN, BSN

## 2023-09-08 NOTE — PLAN OF CARE
Problem: SAFETY ADULT - FALL  Goal: Free from fall injury  Description: INTERVENTIONS:  - Assess pt frequently for physical needs  - Identify cognitive and physical deficits and behaviors that affect risk of falls.   - [place] fall precautions as indicated by assessment.  - Educate pt/family on patient safety including physical limitations  - Instruct pt to call for assistance with activity based on assessment  - Modify environment to reduce risk of injury  - Provide assistive devices as appropriate  - Consider OT/PT consult to assist with strengthening/mobility  - Encourage toileting schedule  Outcome: Progressing     Assume care @ 1930  Patient AOX3, RA, Tele-SR and VSS  Up with W/1A, no bm overnight  Percocet 2 Tab PRN given for pain  Awaiting placement pending insurance

## 2023-09-08 NOTE — CM/SW NOTE
Message received from Michael at [facility] indicating that insurance auth was just approved and expires 9/11. Michael confirms bed availability for today. [facility] transport set up for 7:30-8:00 pm this evening. PCS updated and available for RN to print. Pt agreeable to associated [facility] costs and discharge plan. Updated pt's RN. RN to give report:  [facility] Phone [phone]   [facility] Began: [id number]    SALMA ShannonN, RN-BC    [id number]

## 2023-09-09 NOTE — PROGRESS NOTES
Pt Alert, NSR, room air, CPAP at night, VSS  Regular diet  Up with walker  Continent.   Good appetite, regular diet  Percocet PRN for pain  Awaiting insurance authorization for [facility]  Continue to monitor

## 2023-09-11 ENCOUNTER — TELEPHONE (OUTPATIENT)
Dept: FAMILY MEDICINE CLINIC | Facility: CLINIC | Age: 63
End: 2023-09-11

## 2023-09-11 NOTE — TELEPHONE ENCOUNTER
Spoke to daughter, and we discussed her situation, and I feel that as long as she is in rehab , they should get a neuro consult, and or neuropsychiatric testing while she is admitted, may need to coordinate with

## 2023-09-11 NOTE — TELEPHONE ENCOUNTER
Pt was admitted to [address] [place] [address] on 8/30/23- please see hospital notes for continuation

## 2023-09-11 NOTE — TELEPHONE ENCOUNTER
RN Spoke to daughter- pt has been in the hospital for UTI that was not treatable with home ABX? Pt was admitted 8/30 at [facility] for UTI. Roberto Costa [address] [facility] [address] [place], [address] [place]    Phone: [phone]      Pt states got into rehab facility on Friday 9/8/23- Pt called daughter on Saturday and daughter states that she was sounding normal baseline but then all the sudden she started talking about \"cats\". Daughter states she starts talking about cat littler boxes and seeing cats when she starts getting hallucinations? Daughter wants to know if she still has underlying UTI? No repeat urine was done on discharge    Daughter is very frustated and is really struggling with caring for her mom. She is really believes that mom needs inpatient evaluation and she needs to seen somewhere? Daughter states she can not keep doing this as she knows mom is not mentally fit and duaghter can not take care of her and try to take care of herself and her own family. Do you think Neuro referral for dementia?

## 2023-10-03 PROBLEM — Z79.899 POLYPHARMACY: Status: ACTIVE | Noted: 2023-10-03

## 2023-10-17 ENCOUNTER — OFFICE VISIT (OUTPATIENT)
Dept: PAIN CLINIC | Facility: CLINIC | Age: 63
End: 2023-10-17
Payer: COMMERCIAL

## 2023-10-17 VITALS — SYSTOLIC BLOOD PRESSURE: 126 MMHG | OXYGEN SATURATION: 93 % | HEART RATE: 59 BPM | DIASTOLIC BLOOD PRESSURE: 68 MMHG

## 2023-10-17 DIAGNOSIS — M54.16 LUMBAR RADICULOPATHY: Primary | ICD-10-CM

## 2023-10-17 DIAGNOSIS — M48.062 SPINAL STENOSIS OF LUMBAR REGION WITH NEUROGENIC CLAUDICATION: ICD-10-CM

## 2023-10-17 PROCEDURE — 3078F DIAST BP <80 MM HG: CPT | Performed by: PHYSICIAN ASSISTANT

## 2023-10-17 PROCEDURE — 99214 OFFICE O/P EST MOD 30 MIN: CPT | Performed by: PHYSICIAN ASSISTANT

## 2023-10-17 PROCEDURE — 3074F SYST BP LT 130 MM HG: CPT | Performed by: PHYSICIAN ASSISTANT

## 2023-10-17 NOTE — PROGRESS NOTES
Patient presents in office today with reported pain in left side low back, glute, radiating down leg    Current pain level reported = 8/10    Last reported dose of n/a      Narcotic Contract renewal n/a    Urine Drug screen n/a

## 2023-10-18 ENCOUNTER — TELEPHONE (OUTPATIENT)
Dept: PAIN CLINIC | Facility: CLINIC | Age: 63
End: 2023-10-18

## 2023-10-18 NOTE — TELEPHONE ENCOUNTER
Medical Clearance faxed to [facility] at Fax #: [phone];[id number] [email or web address]     10/18/23    RE: Li Hernadez    : 10/20/1960    Dear Dr. Alejandra Ladd,    Your patient is being scheduled for a pain management procedure at [facility].    Procedure:  Left L4/5,L5/S1 Transforaminal Injection. Date of Procedure: TBD -pending medical clearance. Physician: Cathleen Eisenberg- Anesthesiologist     Your patient is currently taking Xarelto.  usually recommends the medication be held for 3 days prior to injection. Please verify patient is cleared to proceed with pain management procedures.

## 2023-10-18 NOTE — TELEPHONE ENCOUNTER
Order Questions    Question Answer Comment   Anesthesia Type Sedation    Provider [facility] Procedure Lab    Procedure Transforaminal    Laterality/Level left L4/5 and L5/S1 TF-JOSE    CPT (Hit enter after each entry) INJECTION, ANESTHETIC/STEROID, TRANSFORAMINAL EPIDURAL; LUMBAR/SACRAL, SINGLE LEVEL     INJECTION, ANESTHETIC/STEROID, TRANSFORAMINAL EPIDURAL; LUMBAR/SACRAL, ADD'L LEVEL    Medical clearance requested (will send to Pain Navigator) Yes Sameer Stephens   Patient has Medicare coverage?  No

## 2023-10-19 ENCOUNTER — MED REC SCAN ONLY (OUTPATIENT)
Dept: FAMILY MEDICINE CLINIC | Facility: CLINIC | Age: 63
End: 2023-10-19

## 2023-10-20 ENCOUNTER — TELEPHONE (OUTPATIENT)
Dept: PAIN CLINIC | Facility: CLINIC | Age: 63
End: 2023-10-20

## 2023-10-20 NOTE — TELEPHONE ENCOUNTER
Received Medical Clearance from [address] [place] [address] Dr KAMINSKI - patient is cleared to hold Xarelto for 3 days prior to injection. Sent to scan.

## 2023-10-20 NOTE — TELEPHONE ENCOUNTER
Prior Authorization for left L4/5, L5/S1 TF-JOSE  [facility]  CPT codes: [id number], 81359   Request for injection pending review, YES   pending reference # [id number]   Clinical notes submitted via faxed clinical to [phone]  confirmation received.  Yes

## 2023-10-20 NOTE — TELEPHONE ENCOUNTER
Prior authorization request completed for: left L4/5, L5/S1 TF-JOSE  Authorization #  [id number]  Authorization dates: 10/20/2023-11/18/2023  CPT codes approved:  30584, 03283  Number of visits/dates of service approved: 1  Physician: LARISA  Location: [facility]       Patient can be scheduled. Routed to Navigator.

## 2023-10-23 NOTE — TELEPHONE ENCOUNTER
Shlomo Dozier days ago     ORION  Prior authorization request completed for: left L4/5, L5/S1 TF-JOSE  Authorization #  [id number]  Authorization dates: 10/20/2023-11/18/2023  CPT codes approved:  71263, 46775  Number of visits/dates of service approved: 1  Physician: LARISA  Location: [facility]         Patient can be scheduled. Routed to Navigator.

## 2023-10-27 NOTE — TELEPHONE ENCOUNTER
Pt failed refill protocol for the following reasons:  Name from pharmacy: VENLAFAXINE ER 150MG CAPSULES          Will file in chart as: VENLAFAXINE  MG Oral Capsule SR 24 Hr    Sig: TAKE 2 CAPSULES(300 MG) BY MOUTH DAILY    Disp: 180 capsule    Refills: 0 (Pharmacy requested: Not specified)    Start: 10/27/2023    Class: Normal    Non-formulary    Last ordered: 2 months ago (8/2/2023) by Latrell Darby DO    Last refill: 8/2/2023    Rx #: [id number]       To be filled at: [address], [address], [phone], [phone]         Last refill: 8/2/23  Last appt: 7/5/23  Next appt: Future Appointments   Date Time Provider Jennifer Stanley   12/20/2023  1:00 PM Ollie Primrose, APRN [facility] [address]         Forward to Dr. Ariadna Kohler, please advise on refills. Thank you.

## 2023-10-28 RX ORDER — VENLAFAXINE HYDROCHLORIDE 150 MG/1
300 CAPSULE, EXTENDED RELEASE ORAL DAILY
Qty: 180 CAPSULE | Refills: 2 | Status: SHIPPED | OUTPATIENT
Start: 2023-10-28

## 2023-11-01 RX ORDER — OXYCODONE HYDROCHLORIDE AND ACETAMINOPHEN 5; 325 MG/1; MG/1
1-2 TABLET ORAL EVERY 6 HOURS PRN
Qty: 30 TABLET | Refills: 0 | Status: SHIPPED | OUTPATIENT
Start: 2023-11-01 | End: 2023-12-01

## 2023-11-01 NOTE — TELEPHONE ENCOUNTER
Patient asking to speak to nurse about ongoing back issues    She didn't want to get into further details    Please adv  Thank you

## 2023-11-01 NOTE — TELEPHONE ENCOUNTER
Pt reports she is at home - was at rehab for legs, recent back surgery    Has been home for 1 week    Needs refill for pain med  Oxycodone/apap 5/325mg - generic for percocet  Takes every 6 hours as needed    Pain worse past 3 days - thinks due to weather/snow    Does not have car - will have son  Rx  [facility] 34/47    Pt also requesting appt with Dr. Alejandro Kowalski to discuss/review lab work and discuss recurrent UTIs    Future Appointments   Date Time Provider Jennifer Stanley   11/15/2023  1:00 PM Henri Garcia DO [facility] Cristy Bitter   12/20/2023  1:00 PM WENDY Doan EMG [facility] [address]     LOV 0705/23  Last refill on 08/24/23, for #30 tabs, with 0 refills  oxyCODONE-acetaminophen 5-325 MG Oral Tab     Order(s) pending, please review. Thank you.

## 2023-11-02 ENCOUNTER — TELEPHONE (OUTPATIENT)
Dept: FAMILY MEDICINE CLINIC | Facility: CLINIC | Age: 63
End: 2023-11-02

## 2023-11-03 RX ORDER — RIVAROXABAN 20 MG/1
20 TABLET, FILM COATED ORAL
Qty: 30 TABLET | Refills: 5 | Status: SHIPPED | OUTPATIENT
Start: 2023-11-03

## 2023-11-03 RX ORDER — BUMETANIDE 1 MG/1
1 TABLET ORAL DAILY
Qty: 30 TABLET | Refills: 0 | Status: SHIPPED | OUTPATIENT
Start: 2023-11-03 | End: 2023-11-03

## 2023-11-03 RX ORDER — BUMETANIDE 1 MG/1
1 TABLET ORAL DAILY
Qty: 90 TABLET | Refills: 0 | Status: SHIPPED | OUTPATIENT
Start: 2023-11-03

## 2023-11-03 NOTE — TELEPHONE ENCOUNTER
Last OV 7/5/23  Last lab 8/30/23 A1c 5.6  Last refilled:  8/2/23 sitagliptin #90  2 refills  3/18/23  xarelto #30  5 refill

## 2023-11-03 NOTE — TELEPHONE ENCOUNTER
Patient requests refill         Januvia  Blood [facility]    [address]

## 2023-11-03 NOTE — TELEPHONE ENCOUNTER
Pt reports - had called earlier in week for pain med refill    Had other Rx refilled - had son  Rx, but pain med was not available   - advised pt will have to call pharmacy to see what happened - she v/u    Pt reports has been home for 3 weeks since being at rehab    Feet, ankles, bottom of toes - swollen, tight, and numb    Takes gabapentin - stated has not been taking TID, but was taking BID    Pt c/o Legs feel heavy  Pt reports Dr. Ward Moreira aware of pt's medical history regarding leg swelling    Pt stated was on Bumetanide previously    Advised pt that Dr. Ward Moreira not in office today and will be out of office next week, will have covering provider address if needed - she v/u    Please advise, thank you    (Will need to call pharmacy regarding pain med Rx)

## 2023-11-03 NOTE — TELEPHONE ENCOUNTER
Diabetic Medication Protocol Kdivlc0011/03/2023 10:50 AM   Protocol Details Microalbumin procedure in past 12 months or taking ACE/ARB    HgBA1C procedure resulted in past 6 months    Last HgBA1C < 7.5    Appointment in past 6 or next 3 months     Routing to provider per protocol.   rivaroxaban (XARELTO) 20 MG Oral Tab   Last refilled on ? for #?  with ? rf.    SITagliptin Phosphate (JANUVIA) 50 MG Oral Tab (Discontinued)    Last refilled on 8/2/23 for #90  with 2 rf.    Last labs 8/30/23.   Last seen on 7/5/23.       Future Appointments   Date Time Provider Department Center   11/15/2023  1:00 PM Alex Triplett DO [facility]   12/20/2023  1:00 PM Janie Garcia APRN [facility]          Thank you.

## 2023-11-03 NOTE — TELEPHONE ENCOUNTER
I sent in the bumex refill. Use for a few days and then as needed. Hopefully swelling will go down. Follow up with Dr. Ariadna Kohler as scheduled.

## 2023-11-03 NOTE — TELEPHONE ENCOUNTER
Hypertension Medications Protocol Wrpofm6411/03/2023 02:43 PM   Protocol Details CMP or BMP in past 12 months    Last serum creatinine< 2.0    Appointment in past 6 or next 3 months      Routing to provider per protocol. bumetanide 1 MG Oral Tab   Last refilled on 11/3/23 for #90  with 0 rf. Last labs 9/1/23. Last seen on 7/5/23. Future Appointments   Date Time Provider Jennifer Stanley   11/15/2023  1:00 PM Cecy Ventura DO [facility]   12/20/2023  1:00 PM WENDY GillespieI [facility]          Thank you.

## 2023-11-09 ENCOUNTER — MED REC SCAN ONLY (OUTPATIENT)
Dept: FAMILY MEDICINE CLINIC | Facility: CLINIC | Age: 63
End: 2023-11-09

## 2023-11-10 NOTE — TELEPHONE ENCOUNTER
PT CALLED AND ADV ONLY HAS 11 PILL PAIN PILL LEFT.  PT ADV HAS APT ON WEDENSDAY 11/15 - SHE ADV WILL NOT MAKE IT UNTIL THEN.     ASKED PT IF SHE CAN MAKE IT TILL MONDAY WHEN  COMES IN AND SHE SAID NO - CAN PT GET REFILL OF      oxyCODONE-acetaminophen 5-325 MG Oral Tab     Jonas Macdonald [address]    THANK YOU

## 2023-11-13 RX ORDER — OXYCODONE HYDROCHLORIDE AND ACETAMINOPHEN 5; 325 MG/1; MG/1
1-2 TABLET ORAL EVERY 6 HOURS PRN
Qty: 30 TABLET | Refills: 0 | Status: SHIPPED | OUTPATIENT
Start: 2023-11-13 | End: 2023-12-13

## 2023-11-15 ENCOUNTER — OFFICE VISIT (OUTPATIENT)
Dept: FAMILY MEDICINE CLINIC | Facility: CLINIC | Age: 63
End: 2023-11-15
Payer: COMMERCIAL

## 2023-11-15 VITALS
DIASTOLIC BLOOD PRESSURE: 20 MMHG | WEIGHT: 273 LBS | OXYGEN SATURATION: 95 % | RESPIRATION RATE: 20 BRPM | BODY MASS INDEX: 45 KG/M2 | HEART RATE: 71 BPM | SYSTOLIC BLOOD PRESSURE: 118 MMHG | TEMPERATURE: 97 F

## 2023-11-15 DIAGNOSIS — R41.841 COGNITIVE COMMUNICATION DEFICIT: ICD-10-CM

## 2023-11-15 DIAGNOSIS — R44.3 HALLUCINATIONS: ICD-10-CM

## 2023-11-15 DIAGNOSIS — M54.16 LUMBAR RADICULOPATHY: ICD-10-CM

## 2023-11-15 DIAGNOSIS — E11.65 TYPE 2 DIABETES MELLITUS WITH HYPERGLYCEMIA, WITHOUT LONG-TERM CURRENT USE OF INSULIN (HCC): Primary | ICD-10-CM

## 2023-11-15 DIAGNOSIS — Z12.31 BREAST CANCER SCREENING BY MAMMOGRAM: ICD-10-CM

## 2023-11-15 DIAGNOSIS — F31.12 BIPOLAR 1 DISORDER WITH MODERATE MANIA (HCC): ICD-10-CM

## 2023-11-15 DIAGNOSIS — F33.1 MAJOR DEPRESSIVE DISORDER, RECURRENT, MODERATE (HCC): ICD-10-CM

## 2023-11-15 PROBLEM — E66.01 MORBID (SEVERE) OBESITY DUE TO EXCESS CALORIES (HCC): Status: ACTIVE | Noted: 2023-09-08

## 2023-11-15 PROBLEM — G47.33 OBSTRUCTIVE SLEEP APNEA (ADULT) (PEDIATRIC): Status: ACTIVE | Noted: 2023-09-08

## 2023-11-15 PROBLEM — F41.1 GENERALIZED ANXIETY DISORDER: Status: ACTIVE | Noted: 2023-09-08

## 2023-11-15 RX ORDER — QUETIAPINE FUMARATE 50 MG/1
100 TABLET, FILM COATED ORAL NIGHTLY
COMMUNITY
Start: 2023-11-15

## 2023-11-15 RX ORDER — GABAPENTIN 300 MG/1
600 CAPSULE ORAL 2 TIMES DAILY
Qty: 120 CAPSULE | Refills: 3 | Status: SHIPPED | OUTPATIENT
Start: 2023-11-15

## 2023-11-28 ENCOUNTER — APPOINTMENT (OUTPATIENT)
Dept: GENERAL RADIOLOGY | Facility: HOSPITAL | Age: 63
End: 2023-11-28
Attending: ANESTHESIOLOGY
Payer: COMMERCIAL

## 2023-11-28 ENCOUNTER — HOSPITAL ENCOUNTER (OUTPATIENT)
Facility: HOSPITAL | Age: 63
Setting detail: HOSPITAL OUTPATIENT SURGERY
Discharge: HOME | End: 2023-11-28
Attending: ANESTHESIOLOGY | Admitting: ANESTHESIOLOGY
Payer: COMMERCIAL

## 2023-11-28 VITALS
TEMPERATURE: 87 F | RESPIRATION RATE: 18 BRPM | OXYGEN SATURATION: 94 % | DIASTOLIC BLOOD PRESSURE: 99 MMHG | SYSTOLIC BLOOD PRESSURE: 120 MMHG | HEART RATE: 61 BPM

## 2023-11-28 LAB — GLUCOSE BLD-MCNC: 119 MG/DL (ref 70–99)

## 2023-11-28 PROCEDURE — 64484 NJX AA&/STRD TFRM EPI L/S EA: CPT | Performed by: ANESTHESIOLOGY

## 2023-11-28 PROCEDURE — 64483 NJX AA&/STRD TFRM EPI L/S 1: CPT | Performed by: ANESTHESIOLOGY

## 2023-11-28 PROCEDURE — 3E0R3BZ INTRODUCTION OF ANESTHETIC AGENT INTO SPINAL CANAL, PERCUTANEOUS APPROACH: ICD-10-PCS | Performed by: ANESTHESIOLOGY

## 2023-11-28 PROCEDURE — 3E0R33Z INTRODUCTION OF ANTI-INFLAMMATORY INTO SPINAL CANAL, PERCUTANEOUS APPROACH: ICD-10-PCS | Performed by: ANESTHESIOLOGY

## 2023-11-28 RX ORDER — DEXAMETHASONE SODIUM PHOSPHATE 10 MG/ML
INJECTION, SOLUTION INTRAMUSCULAR; INTRAVENOUS
Status: DISCONTINUED | OUTPATIENT
Start: 2023-11-28 | End: 2023-11-28

## 2023-11-28 RX ORDER — DIPHENHYDRAMINE HYDROCHLORIDE 50 MG/ML
50 INJECTION INTRAMUSCULAR; INTRAVENOUS ONCE AS NEEDED
Status: CANCELLED | OUTPATIENT
Start: 2023-11-28 | End: 2023-11-28

## 2023-11-28 RX ORDER — LIDOCAINE HYDROCHLORIDE 10 MG/ML
INJECTION, SOLUTION EPIDURAL; INFILTRATION; INTRACAUDAL; PERINEURAL
Status: DISCONTINUED | OUTPATIENT
Start: 2023-11-28 | End: 2023-11-28

## 2023-11-28 RX ORDER — ONDANSETRON 2 MG/ML
4 INJECTION INTRAMUSCULAR; INTRAVENOUS ONCE AS NEEDED
Status: CANCELLED | OUTPATIENT
Start: 2023-11-28 | End: 2023-11-28

## 2023-11-28 RX ORDER — SODIUM CHLORIDE 9 MG/ML
INJECTION INTRAVENOUS
Status: DISCONTINUED | OUTPATIENT
Start: 2023-11-28 | End: 2023-11-28

## 2023-11-28 RX ORDER — SODIUM CHLORIDE, SODIUM LACTATE, POTASSIUM CHLORIDE, CALCIUM CHLORIDE 600; 310; 30; 20 MG/100ML; MG/100ML; MG/100ML; MG/100ML
100 INJECTION, SOLUTION INTRAVENOUS CONTINUOUS
Status: DISCONTINUED | OUTPATIENT
Start: 2023-11-28 | End: 2023-11-28

## 2023-11-28 NOTE — DISCHARGE INSTRUCTIONS
Home Care Instructions Following Your Pain Procedure     Piotr Banda,  It has been a pleasure to have you as our patient. To help you at home, you must follow these general discharge instructions. We will review these with you before you are discharged. It is our hope that you have a complete and uneventful recovery from our procedure. General Instructions:  What to Expect:  Bandages from your procedure today can be removed when you get home. Please avoid soaking and/or swimming for 24 hours. Showering is okay  It is normal to have increased pain symptoms and/or pain at injection site for up to 3-5 days after procedure, you can use heat or ice (20 minutes on 20 minutes off) for comfort. You may experience some temporary side effects which may include restlessness or insomnia, flushing of the face, or heart palpitations. Please contact the provider if these symptoms do not resolve within 3-4 days. Lightheadedness or nausea may occur and should resolve within 24 to 48 hours. If you develop a headache after treatment, rest, drink fluids (with caffeine, if possible) and take mild over-the-counter pain medication. If the headache does not improve with the above treatment, contact the physician. Home Medications:  Resume all previously prescribed medication. Please avoid taking NSAIDs (Non-Steriodal Anti-Inflammatory Drugs) such as:  Ibuprofen ( Advil, Motrin) Aleve (Naproxen), Diclofenac, Meloxicam for 6 hours after procedure. If you are on Coumadin (Warfarin) or any other anti-coagulant (or \"blood thinning\") medication such as Plavix (Clopidogrel), Xarelto (Rivaroxaban), Eliquis (Apixaban), Effient (Prasugrel) etc., restart on the following day from the procedure unless otherwise directed by your provider. If you are a diabetic, please increase the frequency of your glucose monitoring after the procedure as steroids may cause a temporary (2-3 day) increase in your blood sugar.   Contact your primary care physician if your blood sugar remains elevated as you may require some medication adjustment. Diet:  Resume your regular diet as tolerated. Activity: We recommend that you relax and rest during the rest of your procedure day. If you feel weakness in your arms or legs do not drive. Follow-up Appointment  Please schedule a follow-up visit within 3 to 4 weeks after your last procedure date. Question or Concerns:  Feel free to call our office with any questions or concerns at [phone] (option #2)    Dana Velasquez  Thank you for coming to [facility] for your procedure. The nurses try very hard to make sure you receive the best care possible. Your trust in them as well as us is greatly appreciated.     Thanks so much,   Dr. Maulik Ty

## 2023-11-28 NOTE — H&P
History & Physical Examination    Patient Name: Yaquelin Lopez  MRN: [id number]  [facility]: [id number]  YOB: 1960    Pre-Operative Diagnosis:  Lumbar radiculitis [M54.16]    Present Illness: Lumbar radiculitis    Medications Prior to Admission   Medication Sig Dispense Refill Last Dose    gabapentin 300 MG Oral Cap Take 2 capsules (600 mg total) by mouth in the morning and 2 capsules (600 mg total) before bedtime. 120 capsule 3     QUEtiapine 50 MG Oral Tab Take 2 tablets (100 mg total) by mouth nightly. SITagliptin Phosphate (JANUVIA) 50 MG Oral Tab Take 1 tablet (50 mg total) by mouth daily. 90 tablet 2     XARELTO 20 MG Oral Tab Take 1 tablet (20 mg total) by mouth daily with dinner. 30 tablet 5     BUMETANIDE 1 MG Oral Tab TAKE 1 TABLET(1 MG) BY MOUTH DAILY 90 tablet 0     venlafaxine  MG Oral Capsule SR 24 Hr Take 2 capsules (300 mg total) by mouth daily. 180 capsule 2     OXcarbazepine 150 MG Oral Tab Take 1 tablet (150 mg total) by mouth 2 (two) times daily. 180 tablet 2     lamoTRIgine 150 MG Oral Tab Take 1 tablet (150 mg total) by mouth daily. 90 tablet 2     atorvastatin 20 MG Oral Tab Take 1 tablet (20 mg total) by mouth nightly. 90 tablet 2     metoprolol succinate ER 25 MG Oral Tablet 24 Hr 1 po bid 180 tablet 2     Blood Glucose Monitoring Suppl w/Device Does not apply Kit Pt is to check blood sugar two times a day DX: E11.9 1 kit 0     Naloxone HCl 4 MG/0.1ML Nasal Liquid 4 mg by Nasal route as needed. If patient remains unresponsive, repeat dose in other nostril 2-5 minutes after first dose. 1 kit 0     ALBUTEROL 108 (90 Base) MCG/ACT Inhalation Aero Soln INHALE 2 PUFFS INTO LUNGS EVERY 6 HOURS AS NEEDED FOR WHEEZING 8.5 g 0     albuterol (2.5 MG/3ML) 0.083% Inhalation Nebu Soln Take 3 mL (2.5 mg total) by nebulization 4 (four) times daily as needed for Wheezing or Shortness of Breath.  While awake 50 each 0     Glucose Blood (ACCU-CHEK GUIDE) In Vitro Strip TEST TWICE DAILY. 100 strip 1     Accu-Chek FastClix Lancets Does not apply Misc         Current Facility-Administered Medications   Medication Dose Route Frequency    lactated ringers infusion  100 mL/hr Intravenous Continuous       Allergies: Allergies   Allergen Reactions    Bee Venom SHORTNESS OF BREATH     Shortness of breath    Sulfa Antibiotics UNKNOWN    Clindamycin RASH       Past Medical History:   Diagnosis Date    Anesthesia complication     Anxiety state     Arrhythmia     A fib    Asthma     Calculus of kidney     Congestive heart disease ([facility] [address]     Depression     Diabetes ([facility] [address] 11/17/2020    Difficult intubation     DJD (degenerative joint disease), lumbar     High blood pressure     Left knee DJD     Migraine     Migraines     ADRIANA (obstructive sleep apnea) 12/01/2020    Psoriasis     Shortness of breath     Sleep apnea     no device    TMJ arthritis     Visual impairment     glasses     Past Surgical History:   Procedure Laterality Date    ABLATION      Cardiac. A fib    ENDOMETRIAL ABLATION      OTHER SURGICAL HISTORY      R shoulder arthroplasty. L knee exploratory surgery, B foot surgery, uterine d and c    TONSILLECTOMY       Family History   Problem Relation Age of Onset    Musculo-skelatal Disorder Mother         osteopenia    Thyroid Disorder Mother         hypo    Psychiatric Mother         dementia    Diabetes Father      Social History     Tobacco Use    Smoking status: Former     Types: Cigarettes     Start date: 5/10/2013    Smokeless tobacco: Former     Quit date: 9/4/2000   Substance Use Topics    Alcohol use: Not Currently       SYSTEM Check if Review is Normal Check if Physical Exam is Normal If not normal, please explain:   HEENT [x ] [x ]    NECK & BACK [x ] [x ]    HEART [x ] [x ]    LUNGS [x ] [x ]    ABDOMEN [x ] [x ]    UROGENITAL [x ] [x ]    EXTREMITIES [x ] [x ]    OTHER        [ x ] I have discussed the risks and benefits and alternatives with the patient/family.   They understand and agree to proceed with plan of care. [ x ] I have reviewed the History and Physical done within the last 30 days. Any changes noted above.     Jes Camilo MD

## 2023-11-28 NOTE — OPERATIVE REPORT
[facility]  Operative Report  2023     Yaquelin Lopez Patient Status:  Hospital Outpatient Surgery    10/20/1960 MRN [id number]   Location [address] [address] Attending Kehinde Suárez MD   [facility] # 0 PCP Latrell Darby DO     Indication: Cheko Quiñonez is a 58year old female with lumbar radiculitis    Preoperative Diagnosis:  Lumbar radiculitis [M54.16]    Postoperative Diagnosis: Same as above. Procedure performed: left L4/5 and L5/S1 TRANSFORAMINAL LUMBAR EPIDURAL STEROID INJECTION MULTIPLE LEVEL with local     Anesthesia: Local     EBL: Less than 1 ml. Procedure Description:  After reviewing the patient's history and performing a focused physical examination, the diagnosis was confirmed and contraindications such as infection and coagulopathy were ruled out. Following review of potential side effects and complications, including but not necessarily limited to infection, allergic reaction, local tissue breakdown, nerve injury, and paresis, the patient indicated they understood and agreed to proceed. After obtaining the informed consent, the patient was brought to the procedure room and monitored. In the prone position, following sterile prep and drape of the lumbar region,  the  L4 neural foramen was identified under fluoroscopy. The skin and subcutaneous tissue was anesthetized via 25-gauge 1.5\" needle with approximately 2 cc of 1% lidocaine. A 22-gauge 5\" Quincke spinal needle was introduced toward the inferior aspect of the junction between the transverse process and pedicle of the  L4 level atraumatically under fluoroscopic guidance. The needle was advanced into the anterior epidural space at this level. The needle position was confirmed under AP and lateral fluoroscopic view. Following negative aspiration for CSF and blood, approximately 1 cc of Omnipaque 240 was injected. An excellent contrast spread along the epidural space and the nerve root was obtained. At this point, 1cc of normal saline with 5 mg of dexamethasone was injected without complication. The needle was withdrawn with stylet in situ after being flushed with 1 cc PF lidocaine. The  L5 neural foramen was also identified under fluoroscopy. The skin and subcutaneous tissue was anesthetized via 25-gauge 1.5\" needle with approximately 2 cc of 1% lidocaine. A 22-gauge 5\" Quincke spinal needle was introduced toward the inferior aspect of the junction between the transverse process and pedicle of the L5 level atraumatically under fluoroscopic guidance. The needle was advanced into the anterior epidural space at this level. The needle position was confirmed under AP and lateral fluoroscopic view. Following negative aspiration for CSF and blood, approximately 1 cc of Omnipaque 240 was injected. An excellent contrast spread along the epidural space and the nerve root was obtained. At this point, 1cc of normal saline with 5 mg of dexamethasone was injected without complication. The needle was withdrawn with stylet in situ after being flushed with 1 cc PF lidocaine. .  The patient tolerated procedure very well. The patient was observed until discharge criteria met. Discharge instructions were given and patient was released to a responsible adult. Complications: None. Follow up: The patient was followed in the pain clinic as needed basis.         Kierra Lees MD

## 2023-11-29 ENCOUNTER — TELEPHONE (OUTPATIENT)
Dept: PAIN CLINIC | Facility: CLINIC | Age: 63
End: 2023-11-29

## 2023-11-29 NOTE — TELEPHONE ENCOUNTER
Aileen called placed to patient for post procedure follow up. Patient stated \"I'm ok\"I had rough night \"I could not sleep\"I just feel exhausted haven\"t sleeping days prior the injection. Pt sates that\" she knows that this is not the ultimate cure to her problem but surgery she will call 's office to schedule with them\"  Informed patient that soreness is to be expected after the procedure. Educated patient that it takes 3-5 days for the steroid to be effective and to allow adequate time for medication to work. Encouraged patient to alternate ice and heat and to take medications as prescribed. Pt verbalized understanding to call with any questions or concerns. Procedure: TLESI  Date: 11/28/2023  Follow up Visit Scheduled:  Will call to schedule

## 2023-12-04 ENCOUNTER — TELEPHONE (OUTPATIENT)
Dept: FAMILY MEDICINE CLINIC | Facility: CLINIC | Age: 63
End: 2023-12-04

## 2023-12-04 DIAGNOSIS — I48.0 PAF (PAROXYSMAL ATRIAL FIBRILLATION) (HCC): Primary | ICD-10-CM

## 2023-12-04 NOTE — TELEPHONE ENCOUNTER
Pt states she is seeing Dr. Shannen Figueroa on Wednesday    Pt states she saw the surgeon  and the surgery is approved for December 22nd     She needs refill of Xarelto- she states she needs 90 day supply. Last script was for 30 days    She states she needs to have her Pre-op at the visit on Wednesday so she can have her surgery on 12/22    Have you seen any pre-op orders come over for patient?     Dr. Abad Jean is doing the surgery

## 2023-12-04 NOTE — TELEPHONE ENCOUNTER
Patient spoke with surgeon's office    She would like to discuss with nurse    Please adv  Thank you

## 2023-12-04 NOTE — TELEPHONE ENCOUNTER
Pt wants to talk about the pre-op appt she has with Dr Monserrat He on wed. She needs a rf on xarelto, 90 day, [facility] [address].

## 2023-12-05 RX ORDER — RIVAROXABAN 20 MG/1
20 TABLET, FILM COATED ORAL
Qty: 90 TABLET | Refills: 2 | Status: SHIPPED | OUTPATIENT
Start: 2023-12-05

## 2023-12-05 RX ORDER — OXYCODONE HYDROCHLORIDE AND ACETAMINOPHEN 5; 325 MG/1; MG/1
1-2 TABLET ORAL EVERY 6 HOURS PRN
Qty: 30 TABLET | Refills: 0 | Status: SHIPPED | OUTPATIENT
Start: 2023-12-05 | End: 2024-01-04

## 2023-12-06 ENCOUNTER — OFFICE VISIT (OUTPATIENT)
Dept: FAMILY MEDICINE CLINIC | Facility: CLINIC | Age: 63
End: 2023-12-06
Payer: COMMERCIAL

## 2023-12-06 ENCOUNTER — TELEPHONE (OUTPATIENT)
Dept: FAMILY MEDICINE CLINIC | Facility: CLINIC | Age: 63
End: 2023-12-06

## 2023-12-06 ENCOUNTER — HOSPITAL ENCOUNTER (OUTPATIENT)
Dept: GENERAL RADIOLOGY | Age: 63
Discharge: HOME | End: 2023-12-06
Attending: FAMILY MEDICINE
Payer: COMMERCIAL

## 2023-12-06 VITALS
TEMPERATURE: 98 F | DIASTOLIC BLOOD PRESSURE: 68 MMHG | WEIGHT: 264 LBS | HEART RATE: 107 BPM | RESPIRATION RATE: 16 BRPM | HEIGHT: 65 IN | SYSTOLIC BLOOD PRESSURE: 112 MMHG | OXYGEN SATURATION: 96 % | BODY MASS INDEX: 43.99 KG/M2

## 2023-12-06 DIAGNOSIS — M54.16 LUMBAR RADICULOPATHY: ICD-10-CM

## 2023-12-06 DIAGNOSIS — I50.22 CHRONIC SYSTOLIC CONGESTIVE HEART FAILURE (HCC): ICD-10-CM

## 2023-12-06 DIAGNOSIS — Z01.818 PREOP EXAMINATION: ICD-10-CM

## 2023-12-06 DIAGNOSIS — E66.01 MORBID (SEVERE) OBESITY DUE TO EXCESS CALORIES (HCC): ICD-10-CM

## 2023-12-06 DIAGNOSIS — M48.062 SPINAL STENOSIS OF LUMBAR REGION WITH NEUROGENIC CLAUDICATION: Primary | ICD-10-CM

## 2023-12-06 LAB
ALBUMIN SERPL-MCNC: 3.2 G/DL (ref 3.4–5)
ALBUMIN/GLOB SERPL: 0.7 {RATIO} (ref 1–2)
ALP LIVER SERPL-CCNC: 137 U/L
ALT SERPL-CCNC: 14 U/L
ANION GAP SERPL CALC-SCNC: 5 MMOL/L (ref 0–18)
AST SERPL-CCNC: 15 U/L (ref 15–37)
ATRIAL RATE: 81 BPM
BASOPHILS # BLD AUTO: 0.05 X10(3) UL (ref 0–0.2)
BASOPHILS NFR BLD AUTO: 0.6 %
BILIRUB SERPL-MCNC: 0.3 MG/DL (ref 0.1–2)
BILIRUB UR QL STRIP.AUTO: NEGATIVE
BUN BLD-MCNC: 13 MG/DL (ref 9–23)
CALCIUM BLD-MCNC: 9.2 MG/DL (ref 8.5–10.1)
CHLORIDE SERPL-SCNC: 105 MMOL/L (ref 98–112)
CO2 SERPL-SCNC: 27 MMOL/L (ref 21–32)
COLOR UR AUTO: YELLOW
CREAT BLD-MCNC: 1.07 MG/DL
EGFRCR SERPLBLD CKD-EPI 2021: 58 ML/MIN/1.73M2 (ref 60–?)
EOSINOPHIL # BLD AUTO: 0.29 X10(3) UL (ref 0–0.7)
EOSINOPHIL NFR BLD AUTO: 3.4 %
ERYTHROCYTE [DISTWIDTH] IN BLOOD BY AUTOMATED COUNT: 15.7 %
FASTING STATUS PATIENT QL REPORTED: NO
GLOBULIN PLAS-MCNC: 4.6 G/DL (ref 2.8–4.4)
GLUCOSE BLD-MCNC: 128 MG/DL (ref 70–99)
GLUCOSE UR STRIP.AUTO-MCNC: NORMAL MG/DL
HCT VFR BLD AUTO: 44.4 %
HGB BLD-MCNC: 14.3 G/DL
HYALINE CASTS #/AREA URNS AUTO: PRESENT /LPF
IMM GRANULOCYTES # BLD AUTO: 0.02 X10(3) UL (ref 0–1)
IMM GRANULOCYTES NFR BLD: 0.2 %
KETONES UR STRIP.AUTO-MCNC: NEGATIVE MG/DL
LEUKOCYTE ESTERASE UR QL STRIP.AUTO: 500
LYMPHOCYTES # BLD AUTO: 1.76 X10(3) UL (ref 1–4)
LYMPHOCYTES NFR BLD AUTO: 20.5 %
MCH RBC QN AUTO: 25.8 PG (ref 26–34)
MCHC RBC AUTO-ENTMCNC: 32.2 G/DL (ref 31–37)
MCV RBC AUTO: 80.1 FL
MONOCYTES # BLD AUTO: 0.74 X10(3) UL (ref 0.1–1)
MONOCYTES NFR BLD AUTO: 8.6 %
NEUTROPHILS # BLD AUTO: 5.73 X10 (3) UL (ref 1.5–7.7)
NEUTROPHILS # BLD AUTO: 5.73 X10(3) UL (ref 1.5–7.7)
NEUTROPHILS NFR BLD AUTO: 66.7 %
NITRITE UR QL STRIP.AUTO: NEGATIVE
OSMOLALITY SERPL CALC.SUM OF ELEC: 286 MOSM/KG (ref 275–295)
P AXIS: 58 DEGREES
P-R INTERVAL: 158 MS
PH UR STRIP.AUTO: 6 [PH] (ref 5–8)
PLATELET # BLD AUTO: 322 10(3)UL (ref 150–450)
POTASSIUM SERPL-SCNC: 4.6 MMOL/L (ref 3.5–5.1)
PROT SERPL-MCNC: 7.8 G/DL (ref 6.4–8.2)
PROT UR STRIP.AUTO-MCNC: 100 MG/DL
Q-T INTERVAL: 398 MS
QRS DURATION: 82 MS
QTC CALCULATION (BEZET): 462 MS
R AXIS: 50 DEGREES
RBC # BLD AUTO: 5.54 X10(6)UL
RBC #/AREA URNS AUTO: >10 /HPF
SODIUM SERPL-SCNC: 137 MMOL/L (ref 136–145)
SP GR UR STRIP.AUTO: >1.03 (ref 1–1.03)
T AXIS: 75 DEGREES
UROBILINOGEN UR STRIP.AUTO-MCNC: 4 MG/DL
VENTRICULAR RATE: 81 BPM
WBC # BLD AUTO: 8.6 X10(3) UL (ref 4–11)
WBC #/AREA URNS AUTO: >50 /HPF

## 2023-12-06 PROCEDURE — 93000 ELECTROCARDIOGRAM COMPLETE: CPT | Performed by: FAMILY MEDICINE

## 2023-12-06 PROCEDURE — 85025 COMPLETE CBC W/AUTO DIFF WBC: CPT | Performed by: FAMILY MEDICINE

## 2023-12-06 PROCEDURE — 87086 URINE CULTURE/COLONY COUNT: CPT | Performed by: FAMILY MEDICINE

## 2023-12-06 PROCEDURE — 3078F DIAST BP <80 MM HG: CPT | Performed by: FAMILY MEDICINE

## 2023-12-06 PROCEDURE — 3074F SYST BP LT 130 MM HG: CPT | Performed by: FAMILY MEDICINE

## 2023-12-06 PROCEDURE — 3008F BODY MASS INDEX DOCD: CPT | Performed by: FAMILY MEDICINE

## 2023-12-06 PROCEDURE — 99245 OFF/OP CONSLTJ NEW/EST HI 55: CPT | Performed by: FAMILY MEDICINE

## 2023-12-06 PROCEDURE — 80053 COMPREHEN METABOLIC PANEL: CPT | Performed by: FAMILY MEDICINE

## 2023-12-06 PROCEDURE — 85730 THROMBOPLASTIN TIME PARTIAL: CPT | Performed by: FAMILY MEDICINE

## 2023-12-06 PROCEDURE — 81001 URINALYSIS AUTO W/SCOPE: CPT | Performed by: FAMILY MEDICINE

## 2023-12-06 PROCEDURE — 85610 PROTHROMBIN TIME: CPT | Performed by: FAMILY MEDICINE

## 2023-12-06 PROCEDURE — 71046 X-RAY EXAM CHEST 2 VIEWS: CPT | Performed by: FAMILY MEDICINE

## 2023-12-06 NOTE — TELEPHONE ENCOUNTER
Just an FYI, pt fighting what seems to be a cold and isnt feeling great (ears popping, dry mouth and throat, increased sinus mucus, fatigue). Pt will call by noon if she cannot make appt.

## 2023-12-07 ENCOUNTER — TELEPHONE (OUTPATIENT)
Dept: FAMILY MEDICINE CLINIC | Facility: CLINIC | Age: 63
End: 2023-12-07

## 2023-12-07 LAB
APTT PPP: 33.4 SECONDS (ref 23.3–35.6)
INR BLD: 1.36 (ref 0.8–1.2)
PROTHROMBIN TIME: 16.9 SECONDS (ref 11.6–14.8)

## 2023-12-07 NOTE — TELEPHONE ENCOUNTER
Pt was getting test results all night.  Is worried about some of the things she saw.   Prior lab requests before surgery were PT/INR, PTT and urine.  Wants to know if they are okay now. Has she been cleared for surgery?  Regarding heart - wondering if she is having Afib bouts.  Never felt or told about Afib and thinks its scary.   EKG results and urine abnormal - concerned.  Pt advised Dr. Triplett may not have received or seen results yet. Please advise.  Thank you!

## 2023-12-08 ENCOUNTER — TELEPHONE (OUTPATIENT)
Dept: FAMILY MEDICINE CLINIC | Facility: CLINIC | Age: 63
End: 2023-12-08

## 2023-12-08 NOTE — TELEPHONE ENCOUNTER
----- Message from [employer], DO sent at 12/8/2023  6:39 AM CST -----  Please notify Lenard Love that the reason her protime/ bleeding time is elevated is because of the Xarelto she takes for her atrial fibrillation. She should See Dr. Danuta Nicole prior to surgery and get clearance from him regarding whether she still needs to  be on it or if she needs bridging with lovenox prior to surgery, still waiting on MRSA and UC otherwise the rest of her labs looked good.

## 2023-12-09 ENCOUNTER — TELEPHONE (OUTPATIENT)
Dept: FAMILY MEDICINE CLINIC | Facility: CLINIC | Age: 63
End: 2023-12-09

## 2023-12-09 NOTE — TELEPHONE ENCOUNTER
----- Message from Jacobo Coronado DO sent at 12/9/2023  7:19 AM CST -----  Please forward and notify Neg.

## 2023-12-09 NOTE — TELEPHONE ENCOUNTER
Urine culture negative   Patient notified and verbalized understanding.      Hold for DS RN to forward

## 2023-12-11 ENCOUNTER — TELEPHONE (OUTPATIENT)
Dept: FAMILY MEDICINE CLINIC | Facility: CLINIC | Age: 63
End: 2023-12-11

## 2023-12-11 NOTE — TELEPHONE ENCOUNTER
Requests to speak to dr or nurse    States it's an emergency as her \"bp is out of whack\"    Please adv  Thank you

## 2023-12-11 NOTE — TELEPHONE ENCOUNTER
Pt states she is going to cut her metrolol in half tonight and then check her number in the morning and let us know    RN sent [facility] with all information    Pt is seeing cardiology on Thursday

## 2023-12-11 NOTE — TELEPHONE ENCOUNTER
Pt states that this morning she took her pulse ox and it showed her HR was 33    She states right now it is around 43? She states that she was feeling light headed over the weekend but didn't take much of it  Takes metoprolol morning and night - reading this morning was before she took her morning meds including her metorlol    She has her pulse ox right now it is at 72    Pt does not have cardiology visit yet for pre op clearance     RN spoke with Dr. Shannen Figueroa- he advised to 1/2 her metorolol dose this evening. Pt also needs to call cardiology ASAP. Rn urged patient to call Dr. Olena Lopez office and make sure she speaks to a nurse to discuss the issues with her BP and her HR. Patient has had some weight loss and may need an adjustment to her medications and she needs cardiac clearance for her upcoming procedure.     Pt verbalized understanding

## 2023-12-13 ENCOUNTER — TELEPHONE (OUTPATIENT)
Dept: FAMILY MEDICINE CLINIC | Facility: CLINIC | Age: 63
End: 2023-12-13

## 2023-12-13 NOTE — TELEPHONE ENCOUNTER
Pt states that she received a call stating that her urine test was normal. Pt states that surgeon told her that it showed bacteria. He  is calling in an antibiotic for her to stay on until her surgery.    MARIANO

## 2023-12-13 NOTE — TELEPHONE ENCOUNTER
Spoke with the pt and advised of the notes from Dr. Alvarado Okeefe- she is agreeable to taking the abx  I adv that if she has other questions to call and she states that she does but is waiting for a call and will send it via [employer]

## 2023-12-13 NOTE — TELEPHONE ENCOUNTER
So it wasn;t exactly a clean catch and did not grow out a specific agent, BUT, if he feels it's necessary then she should take it

## 2023-12-14 ENCOUNTER — TELEPHONE (OUTPATIENT)
Dept: FAMILY MEDICINE CLINIC | Facility: CLINIC | Age: 63
End: 2023-12-14

## 2023-12-14 RX ORDER — GABAPENTIN 300 MG/1
600 CAPSULE ORAL 2 TIMES DAILY
Qty: 120 CAPSULE | Refills: 3 | Status: SHIPPED | OUTPATIENT
Start: 2023-12-14

## 2023-12-14 RX ORDER — GABAPENTIN 300 MG/1
600 CAPSULE ORAL 2 TIMES DAILY
Qty: 120 CAPSULE | Refills: 3 | Status: SHIPPED | OUTPATIENT
Start: 2023-12-14 | End: 2023-12-14

## 2023-12-14 RX ORDER — OXYCODONE HYDROCHLORIDE AND ACETAMINOPHEN 5; 325 MG/1; MG/1
1-2 TABLET ORAL EVERY 6 HOURS PRN
Qty: 30 TABLET | Refills: 0 | Status: SHIPPED | OUTPATIENT
Start: 2023-12-14 | End: 2024-01-13

## 2023-12-14 NOTE — TELEPHONE ENCOUNTER
Pt states she will need pain medication. Pt states she is in severe pain. Sciatica not bothering as much as it did but surgery is next Friday so she will need it. oxyCODONE-acetaminophen 5-325 MG Oral Tab [[id number]] (Order [id number]     gabapentin 300 MG Oral Cap [id number] (Order [id number])     [facility]:  [facility] [address] AT [facility] OF [address], [phone], [phone]   [facility] [address]   Phone: [phone] Fax: [phone]     Thank you!

## 2023-12-20 ENCOUNTER — TELEPHONE (OUTPATIENT)
Dept: FAMILY MEDICINE CLINIC | Facility: CLINIC | Age: 63
End: 2023-12-20

## 2023-12-20 NOTE — TELEPHONE ENCOUNTER
Pt is calling- to wish  a happy holiday. She states her surgery is scheduled tomorrow- she is little nervous. Pt states she had a fall Friday leaving a [employer] service - she slipped and fell down holding a pole. Was unable to cry out for help or call family- she had to dial EMS to come get her. It was dark, slippery and she was trying to walk with her purse and two drinks in her hand and fell. She just wanted to follow up with Dr. Cindi Lin to make sure that she knows she is having surgery tomorrow but she wanted to let us know that she is excited.

## 2023-12-20 NOTE — TELEPHONE ENCOUNTER
PATIENT CALLING. SHE SAID SHE SLIPPED AND FELL A FEW DAYS AGO. SHE IS ASKING TO SPEAK TO DR BOURGEOIS. SHE SAYS NOTHING WRONG.

## 2024-01-05 ENCOUNTER — MED REC SCAN ONLY (OUTPATIENT)
Dept: FAMILY MEDICINE CLINIC | Facility: CLINIC | Age: 64
End: 2024-01-05

## 2024-01-09 ENCOUNTER — TELEPHONE (OUTPATIENT)
Dept: FAMILY MEDICINE CLINIC | Facility: CLINIC | Age: 64
End: 2024-01-09

## 2024-01-09 NOTE — TELEPHONE ENCOUNTER
Lab Frequency Next Occurrence   MRI SPINE LUMBAR (CPT=72148) Once 02/15/2023   EKG 12 Lead Once 03/17/2023   CT SPINE LUMBAR (CPT=72131) Once 05/15/2023   Urine Culture, Routine Once 07/03/2023   Urinalysis, Routine Once 07/03/2023   Prothrombin Time (PT) [E] Once 07/05/2023   PTT, Activated [E] Once 07/05/2023   MSSA and MRSA Culture Screen Once 07/05/2023   [facility] STACEY 2D+3D SCREENING BILAT (CPT=77067/88204) Once 11/15/2023     Letter mailed to patient reminding them they have outstanding orders.

## 2024-03-21 ENCOUNTER — TELEPHONE (OUTPATIENT)
Dept: FAMILY MEDICINE CLINIC | Facility: CLINIC | Age: 64
End: 2024-03-21

## 2024-03-21 NOTE — TELEPHONE ENCOUNTER
Sera had [place] spinal surgery, son is struggling to care for her, he is asking for some help. Getting sores on her skin, please call tonight.

## 2024-03-22 ENCOUNTER — TELEPHONE (OUTPATIENT)
Dept: FAMILY MEDICINE CLINIC | Facility: CLINIC | Age: 64
End: 2024-03-22

## 2024-03-22 NOTE — TELEPHONE ENCOUNTER
Forward to Dr. Triplett, does patient need to see you in order to have updated office notes? Please place orders for home health as well.

## 2024-03-22 NOTE — TELEPHONE ENCOUNTER
Sergei from [facility] is asking for the most recent office notes, diagnosis codes, and care plan for pt for home health to be faxed to the office : [phone]    [facility] thank you!

## 2024-03-23 NOTE — TELEPHONE ENCOUNTER
Wendy Sutherland MD   [address]   [address]   [place], [place] [address] [phone] (Work)   [phone] (Fax)     This is neuro- they will need to contact to gather most recent information

## 2024-03-25 ENCOUNTER — TELEPHONE (OUTPATIENT)
Dept: FAMILY MEDICINE CLINIC | Facility: CLINIC | Age: 64
End: 2024-03-25

## 2024-03-25 NOTE — TELEPHONE ENCOUNTER
Rn Spoke with Gavino at [facility]- he was unaware that patient had had recent spinal surgery and had just been released from Rehab.  RN provided Brandon with information to neurosurgeon as well as the rehab facility she was at. He is going to reach out to both locations to get updated notes    Will reach out to son as well

## 2024-03-25 NOTE — TELEPHONE ENCOUNTER
Pt called in presenting with memory issues, scattered thoughts, unsure why she is calling.     Pt states she thinks she is having a UTI, but unsure what to do. Talking about recent incontinence, dark brown urine. Does not have nurse with her, unclear what pt wants.      Please call pt to establish what she needs. Believes she needs home health?

## 2024-03-25 NOTE — TELEPHONE ENCOUNTER
Rn reached out to Leandro Max) to discuss home health as orders were placed last week- they have not been able to get patiet set up because they needed most recent office visit notes.  RN provided him with the information he needed- he states he will be reaching out to pt son to discuss intake.      RN Attempted to leave voicemail for pt to call back

## 2024-03-26 ENCOUNTER — TELEPHONE (OUTPATIENT)
Dept: FAMILY MEDICINE CLINIC | Facility: CLINIC | Age: 64
End: 2024-03-26

## 2024-03-26 NOTE — TELEPHONE ENCOUNTER
Shelly from [facility]    They have had 2 nurses out to see the patient    The patient needs a higher level of care than what they are able to provide    She cannot get up  She cannot turn or really move at all    There are soiled hygiene products all over the floor    Please adv  Thank you

## 2024-03-28 NOTE — TELEPHONE ENCOUNTER
Spoke with Anuja Garcia  Nurse   at [facility],  [phone].  about Denises case, she underwent spinal surgery and then was sent to a rehab, SNF  facility she was apparently discharged and was sent home

## 2024-03-29 ENCOUNTER — TELEPHONE (OUTPATIENT)
Dept: FAMILY MEDICINE CLINIC | Facility: CLINIC | Age: 64
End: 2024-03-29

## 2024-04-08 ENCOUNTER — TELEPHONE (OUTPATIENT)
Dept: FAMILY MEDICINE CLINIC | Facility: CLINIC | Age: 64
End: 2024-04-08

## 2024-04-08 NOTE — TELEPHONE ENCOUNTER
Son states pt is still [place] the [facility]- pt was preparing to go to [facility], but [facility] has denied her transfer?    Son was told that there is a \"meeting\" scheduled for tomorrow. Son said that he told his mom that she needs to have this visit with the provider    Son states that she does not know the names of the providers?    Son said he was told to take her to [facility] because of her issues- he said that on her discharge paperwork it states that she was partially ambulatory but that is not the case?    Has Dr. Triplett spoke to  recently at [facility]?   Son is under the impression that Dr. Triplett has been communicating with them.    Son states that [facility] accepted her- but now they have denied her?  He thinks they are doing a PTP    RN spoke to son and advised that if there is a way for him to be part of that meeting that would be beneficial to see what the plan is. Son states he is working tomorrow but will try.    Son would like Dr. Triplett to be aware of the situation as he is the only resource son has right now and he does not know what they will do if patient is released home as he does not have all the resources she will need

## 2024-04-08 NOTE — TELEPHONE ENCOUNTER
Son states pt is currently in [facility] and she is trying to get placed in a care center.  Pt told son that her request for place she wants ([facility] in [place]) was denied by insurance.  Son believes that if DS called it could get pushed through.  Son is not sure, however, if ins was approving somewhere else.  Son says sending pt back to him would not be a good choice at all.  Please advise.  Thank you!

## 2024-05-03 NOTE — TELEPHONE ENCOUNTER
Pt states she started feeling that she was getting chills last week- ramped up again over the weekend. The last two days her lower back is in excruciating pains. Pt states the pain is constant. Pt can not find thermometer- does nto know temp.     Pt decreased strength

## 2024-06-04 NOTE — ED PROVIDER NOTES
Patient Seen in: [address] [facility]    History   Patient presents with:  Upper Extremity Injury (musculoskeletal)    Stated Complaint: SHOULDER PAIN LEFT    Chronic arthritis in multiple joints.   Having pain in left shoulder that is worse th normal. Right eye exhibits no discharge. Left eye exhibits no discharge. No scleral icterus. Cardiovascular: Normal rate, regular rhythm, normal heart sounds and intact distal pulses. Exam reveals no gallop and no friction rub. No murmur heard.   Pulmon Body Location Override (Optional - Billing Will Still Be Based On Selected Body Map Location If Applicable): left mid pretibial Detail Level: Detailed Depth Of Biopsy: dermis Was A Bandage Applied: Yes Size Of Lesion In Cm: 0 Biopsy Type: H and E Biopsy Method: double edge Personna blade Anesthesia Type: 2% lidocaine with epinephrine Anesthesia Volume In Cc: 1 Hemostasis: Aluminum Chloride Wound Care: Petrolatum Dressing: bandage Destruction After The Procedure: No Type Of Destruction Used: Curettage Curettage Text: The wound bed was treated with curettage after the biopsy was performed. Cryotherapy Text: The wound bed was treated with cryotherapy after the biopsy was performed. Electrodesiccation Text: The wound bed was treated with electrodesiccation after the biopsy was performed. Electrodesiccation And Curettage Text: The wound bed was treated with electrodesiccation and curettage after the biopsy was performed. Silver Nitrate Text: The wound bed was treated with silver nitrate after the biopsy was performed. Lab: 451 Consent: Written consent was obtained and risks were reviewed including but not limited to scarring, infection, bleeding, scabbing, incomplete removal, nerve damage and allergy to anesthesia. Post-Care Instructions: I reviewed with the patient in detail post-care instructions. Patient is to keep the biopsy site dry overnight, and then cleanse with soap and water every day, and apply Vaseline, and cover with bandage, every day until completely healed. Notification Instructions: Patient will be notified of biopsy results. However, patient instructed to call the office if not contacted within 2 weeks. Billing Type: Third-Party Bill Information: Selecting Yes will display possible errors in your note based on the variables you have selected. This validation is only offered as a suggestion for you. PLEASE NOTE THAT THE VALIDATION TEXT WILL BE REMOVED WHEN YOU FINALIZE YOUR NOTE. IF YOU WANT TO FAX A PRELIMINARY NOTE YOU WILL NEED TO TOGGLE THIS TO 'NO' IF YOU DO NOT WANT IT IN YOUR FAXED NOTE.

## 2025-06-25 ENCOUNTER — TELEPHONE (OUTPATIENT)
Dept: FAMILY MEDICINE CLINIC | Facility: CLINIC | Age: 65
End: 2025-06-25

## (undated) DEVICE — BANDAGE ADH W1INXL3IN NAT FAB WVN N ADH PD

## (undated) DEVICE — REMOVER LOT 4OZ NONIRRITATING UNSCNT SFT

## (undated) DEVICE — BANDAID CURAD 3IN X 1IN

## (undated) DEVICE — GLOVE SUR 6.5 SENSICARE PIP WHT PWD F

## (undated) DEVICE — NEEDLE SPNL 22GA L5IN BLK HUB QNCKE BVL DISP

## (undated) DEVICE — GLOVE SURG SENSICARE SZ 7-1/2

## (undated) DEVICE — NEEDLE SPINAL 22X7 405149

## (undated) DEVICE — PAIN TRAY: Brand: MEDLINE INDUSTRIES, INC.

## (undated) DEVICE — REMOVER PREP SOLUTION 4OZ

## (undated) DEVICE — GLOVE SURG SENSICARE SZ 6-1/2

## (undated) DEVICE — GLOVE SURG SENSICARE SZ 7

## (undated) DEVICE — GLV SURG SZ 7.5 THK10MIL WHT ISOLEX SYN

## (undated) NOTE — LETTER
Erasto Sullivan   [address]  [place] [address]           Dear Briana Waldron     Our records indicate that you have outstanding lab work and or testing that was ordered for you and has not yet been completed:  Lab Frequency Next Occurrence   MICROALB/CREAT RATIO, RANDOM URINE Once 09/28/2022   MRI SPINE LUMBAR (CPT=72148) Once 02/15/2023   EKG 12 Lead Once 03/17/2023   CT SPINE LUMBAR (CPT=72131) Once 05/15/2023      To provide you with the best possible care, please complete these orders at your earliest convenience. If you have recently completed these orders please disregard this letter. If you have any questions please call the office at [phone].      Thank you,     [facility]

## (undated) NOTE — LETTER
OUTSIDE TESTING RESULT REQUEST     IMPORTANT: FOR YOUR IMMEDIATE ATTENTION  Please FAX all test results listed below to: [phone]     Testing already done on or about: 2023     * * * * If testing is NOT complete, arrange with patient A.S.A.P. * * * *      Patient Name: Carlton Claleroy  Surgery Date:   Medical Record: [id number]  CSN: [id number]  : 10/20/1960 - A: 60 y     Sex: female  * Surgery not found *  Procedure:   Anesthesia Type: * No surgery found *     Surgeon: * Surgery not found *     The following Testing and Time Line are REQUIRED PER ANESTHESIA     EKG READ AND SIGNED WITHIN   90 days  BMP (requires 4 hour fast) within  90 days      Thank You,   Sent by: Nasim Nj

## (undated) NOTE — MR AVS SNAPSHOT
[address] [facility]  [place] [id number] [phone]               Thank you for choosing us for your health care visit with SHANTELLE Pollard.   We are glad to serve you and happy to provide you with this tooth to make it less sensitive. · Put a cold pack on your jaw over the sore area to help reduce pain. · You may use over-the-counter medicine to ease pain, unless your doctor prescribed another medicine.  If you have chronic liver or kidney disease, talk Take 1 capsule (500 mg total) by mouth 2 (two) times daily. Commonly known as:  AMOXIL           Amoxicillin-Pot Clavulanate 875-125 MG Tabs   Take 1 tablet by mouth 2 (two) times daily.    Commonly known as:  AUGMENTIN           Cyclobenzaprine HCl 10 MG Dietary sodium reduction Reduce dietary sodium intake to <= 100 mmol per day (2.4 g sodium or 6 g sodium chloride)   Aerobic physical activity Regular aerobic physical activity (e.g., brisk walking, light jogging, cycling, swimming, etc.) for a goal of at

## (undated) NOTE — LETTER
[address] [facility]  [address]  [place] [address]  [phone]                6/22/2021        Davida Reyes [facility]  [address] [address]      Dear Samantha Kauffman.     To help us

## (undated) NOTE — LETTER
08/17/17      FAVIAN MILLS  [address]        Dear John Bradley      To help us provide the highest quality medical care, [facility] uses a sophisticated computer system to track our patient records.  During a re

## (undated) NOTE — MR AVS SNAPSHOT
[address] [facility] [id number] [phone]               Thank you for choosing us for your health care visit with Frank Turk DO.   We are glad to serve you and happy to provide you with this sum These medications were sent to [facility] [address], [address] AT [address] [place] [address] [address], [phone], [phone]  [place] [address], [address] [place] [id number]     Phone:  [phone]    - naproxen 500 MG Tabs

## (undated) NOTE — LETTER
23    RE: Alysia Olguin    : 10/20/1960    Dear Dr. Monserrat He,    Your patient is being scheduled for a pain management procedure at [facility].    Procedure:  Right L4/5,L5/S1 Transforaminal Injection   Date of Procedure: TBD -pending medical clearance  Physician: - Anesthesiologist     Your patient is currently taking Eliquis.  usually recommends the medication be held for 3 days prior to injection. Please verify patient is cleared to proceed with pain management procedures. Clearance Approved   ____________    Clearance Denied       ____________      Comments: ______________________________________________________    Signature: ________________________________  Date: _________________       If you have any questions please feel free to contact our office at [phone], option # 2. Please fax this clearance request to our office at fax # [phone] or send electronically.        Thank you,

## (undated) NOTE — LETTER
Sera Sullivan  [address] [facility] [address]    06/25/2025          Bert Zamora,    We have noticed we haven't seen you in office since 12/06/2023 and wanted to check in with you.  According to our records, you are overdue for a physical with Dr. Triplett.    Please call us at [phone] to give us an update and/or get an appointment scheduled.        Thank you,        Tori Gibbons MS, APRN, P-[facility]  [phone]

## (undated) NOTE — LETTER
Sera Sullivan   [address] [facility] [address]           Dear Sera Sullivan     Our records indicate that you have outstanding lab work and or testing that was ordered for you and has not yet been completed:  Lab Frequency Next Occurrence   MRI SPINE LUMBAR (CPT=72148) Once 02/15/2023   EKG 12 Lead Once 03/17/2023   CT SPINE LUMBAR (CPT=72131) Once 05/15/2023   Urine Culture, Routine Once 07/03/2023   Urinalysis, Routine Once 07/03/2023   Prothrombin Time (PT) [E] Once 07/05/2023   PTT, Activated [E] Once 07/05/2023   MSSA and MRSA Culture Screen Once 07/05/2023   RIMA STACEY 2D+3D SCREENING BILAT (CPT=77067/90752) Once 11/15/2023      To provide you with the best possible care, please complete these orders at your earliest convenience. If you have recently completed these orders please disregard this letter.     If you have any questions please call the office at [phone].     Thank you,     [facility]

## (undated) NOTE — LETTER
05/23/19        [facility]  [address] [facility]      Dear Brenda Ayoub,    [address] records indicate that you have outstanding lab work and or testing that was ordered for you and has not yet been completed:  Lab Frequency Next Occurrence   OCCUL

## (undated) NOTE — LETTER
04/23/19    [address]  [address] [facility] [address]      Dear [address] [place] [address],    To help us provide the highest quality medical care, [facility] uses a sophisticated computer system to track our patient records.  During a review

## (undated) NOTE — LETTER
To: Dr Deepali Morfin  Patient Name: Bryant Beltran / Sex: 10/20/1960-A: 60 y  female   Medical Records: [id number] CSN: [id number]    Request for History & Physical for Radiology Procedure at [facility]    The above patient is scheduled to have a procedure performed in Radiology. In order for the procedure to be performed safely, a comprehensive History & Physical, to include the Review of Systems, is required within 30 days of the scheduled appointment. Procedure:  MRI SPINE LUMBAR  Date Scheduled: 3/23/23  Ordered by (Ordering Physician):  Dr Loev Yost    Please FAX the completed H&P to THE [facility] Radiology at [phone]. For Questions: Call THE [facility] Radiology [phone]    If you cannot provide us with a comprehensive History & Physical prior to this appointment, you will need to cancel and reschedule the appointment by calling Central Scheduling [phone]. Thank you.

## (undated) NOTE — LETTER
10/18/23    RE: Griffin Ross    : 10/20/1960    Dear Dr. Charles Stephens,    Your patient is being scheduled for a pain management procedure at [facility].    Procedure:  Left L4/5,L5/S1 Transforaminal Injection. Date of Procedure: TBD -pending medical clearance. Physician: Yoni Ferraro- Anesthesiologist     Your patient is currently taking Xarelto.  usually recommends the medication be held for 3 days prior to injection. Please verify patient is cleared to proceed with pain management procedures. Clearance Approved   ____________    Clearance Denied       ____________      Comments: ______________________________________________________    Signature: ________________________________  Date: _________________       If you have any questions please feel free to contact our office at [phone], option # 2. Please fax this clearance request to our office at fax # [phone] or send electronically.        Thank you,

## (undated) NOTE — LETTER
To: DR. Leland Alicia  Patient Name: Miri Murray / Sex: 10/20/1960-A: 60 y  female   Medical Records: [id number] CSN: [id number]          URGENT     Request for History & Physical for Radiology Procedure at [facility]    The above patient is scheduled to have a procedure performed in Radiology. In order for the procedure to be performed safely, a comprehensive History & Physical, to include the Review of Systems, is required within 30 days of the scheduled appointment. Procedure:  MRI LUMBAR SPINE  Date Scheduled:   03/23/2023    LAST H & P WAS ON 02/15/2023, PER PATIENT ADDENDUM WAS MADE FOR THE UPCOMING MRI, PLEASE FAX OR SCAN IN MEDIA. [address] YOU    Please FAX the completed H&P to THE [facility] Radiology at [phone]. For Questions: Call THE [facility] Radiology [phone]    If you cannot provide us with a comprehensive History & Physical prior to this appointment, you will need to cancel and reschedule the appointment by calling Central Scheduling [phone]. Thank you.

## (undated) NOTE — LETTER
[address] [address]    5/3/2019      Dear  [address] [place] [address]    In order to provide the highest quality care, [facility] uses a sophisticated computer system to track our patient's record

## (undated) NOTE — Clinical Note
DANELLE Prater completed TCM. Patient has upcoming TCM/HFU appointment scheduled on 05/15/23. Thank you.